# Patient Record
Sex: FEMALE | Race: WHITE | Employment: UNEMPLOYED | URBAN - METROPOLITAN AREA
[De-identification: names, ages, dates, MRNs, and addresses within clinical notes are randomized per-mention and may not be internally consistent; named-entity substitution may affect disease eponyms.]

---

## 2023-08-09 ENCOUNTER — LAB REQUISITION (OUTPATIENT)
Dept: LAB | Facility: HOSPITAL | Age: 33
End: 2023-08-09
Payer: COMMERCIAL

## 2023-08-09 DIAGNOSIS — M67.441 GANGLION, RIGHT HAND: ICD-10-CM

## 2023-08-09 PROCEDURE — 88304 TISSUE EXAM BY PATHOLOGIST: CPT | Performed by: ORTHOPAEDIC SURGERY

## 2023-09-26 ENCOUNTER — OFFICE VISIT (OUTPATIENT)
Facility: CLINIC | Age: 33
End: 2023-09-26
Payer: COMMERCIAL

## 2023-09-26 VITALS
BODY MASS INDEX: 41.5 KG/M2 | HEIGHT: 69 IN | SYSTOLIC BLOOD PRESSURE: 138 MMHG | HEART RATE: 70 BPM | WEIGHT: 280.19 LBS | DIASTOLIC BLOOD PRESSURE: 88 MMHG

## 2023-09-26 DIAGNOSIS — Z63.0 STRESS DUE TO MARITAL PROBLEMS: ICD-10-CM

## 2023-09-26 DIAGNOSIS — F43.20 ADJUSTMENT DISORDER, UNSPECIFIED TYPE: ICD-10-CM

## 2023-09-26 DIAGNOSIS — N30.10 CHRONIC INTERSTITIAL CYSTITIS: ICD-10-CM

## 2023-09-26 DIAGNOSIS — Z86.39 HISTORY OF VITAMIN D DEFICIENCY: ICD-10-CM

## 2023-09-26 DIAGNOSIS — N93.0 POSTCOITAL BLEEDING: ICD-10-CM

## 2023-09-26 DIAGNOSIS — E66.01 MORBID OBESITY WITH BMI OF 40.0-44.9, ADULT (HCC): ICD-10-CM

## 2023-09-26 DIAGNOSIS — N94.10 DYSPAREUNIA, FEMALE: ICD-10-CM

## 2023-09-26 DIAGNOSIS — Z31.69 ENCOUNTER FOR PRECONCEPTION CONSULTATION: Primary | ICD-10-CM

## 2023-09-26 DIAGNOSIS — Z86.010 HISTORY OF COLON POLYPS: ICD-10-CM

## 2023-09-26 DIAGNOSIS — Z91.89 AT RISK FOR INFERTILITY: ICD-10-CM

## 2023-09-26 DIAGNOSIS — M62.89 PELVIC FLOOR DYSFUNCTION: ICD-10-CM

## 2023-09-26 DIAGNOSIS — Z84.2: ICD-10-CM

## 2023-09-26 PROBLEM — R31.0 GROSS HEMATURIA: Status: ACTIVE | Noted: 2023-09-06

## 2023-09-26 PROBLEM — R42 VERTIGO: Status: ACTIVE | Noted: 2023-09-26

## 2023-09-26 PROBLEM — J45.909 ASTHMA: Status: ACTIVE | Noted: 2023-09-26

## 2023-09-26 PROBLEM — R42 LIGHTHEADEDNESS: Status: ACTIVE | Noted: 2023-09-26

## 2023-09-26 PROBLEM — M67.441 GANGLION OF FLEXOR TENDON SHEATH OF RIGHT INDEX FINGER: Status: ACTIVE | Noted: 2023-07-17

## 2023-09-26 PROBLEM — H53.19 VISUAL SNOW SYNDROME: Status: ACTIVE | Noted: 2023-09-26

## 2023-09-26 PROBLEM — R42 DIZZINESS: Status: ACTIVE | Noted: 2023-09-26

## 2023-09-26 PROBLEM — L30.9 ECZEMA: Status: ACTIVE | Noted: 2021-07-02

## 2023-09-26 PROBLEM — H53.9 VISUAL DISTURBANCE: Status: ACTIVE | Noted: 2022-11-01

## 2023-09-26 PROBLEM — J45.901 ACUTE SEVERE ASTHMA: Status: ACTIVE | Noted: 2017-08-07

## 2023-09-26 PROBLEM — J45.909 ASTHMATIC BRONCHITIS (HCC): Status: ACTIVE | Noted: 2022-11-01

## 2023-09-26 PROBLEM — Z86.0100 HISTORY OF COLON POLYPS: Status: ACTIVE | Noted: 2023-09-26

## 2023-09-26 PROBLEM — J45.909 ASTHMATIC BRONCHITIS: Status: ACTIVE | Noted: 2022-11-01

## 2023-09-26 PROBLEM — G43.909 MIGRAINE HEADACHE: Status: ACTIVE | Noted: 2023-09-26

## 2023-09-26 PROBLEM — J45.901: Status: ACTIVE | Noted: 2017-08-07

## 2023-09-26 PROBLEM — J45.909 ASTHMA (HCC): Status: ACTIVE | Noted: 2023-09-26

## 2023-09-26 LAB
CONTROL LINE PRESENT WITH A CLEAR BACKGROUND (YES/NO): YES YES/NO
KIT LOT #: NORMAL NUMERIC
PREGNANCY TEST, URINE: NEGATIVE

## 2023-09-26 PROCEDURE — 99205 OFFICE O/P NEW HI 60 MIN: CPT | Performed by: OBSTETRICS & GYNECOLOGY

## 2023-09-26 PROCEDURE — 3008F BODY MASS INDEX DOCD: CPT | Performed by: OBSTETRICS & GYNECOLOGY

## 2023-09-26 PROCEDURE — 3075F SYST BP GE 130 - 139MM HG: CPT | Performed by: OBSTETRICS & GYNECOLOGY

## 2023-09-26 PROCEDURE — 81025 URINE PREGNANCY TEST: CPT | Performed by: OBSTETRICS & GYNECOLOGY

## 2023-09-26 PROCEDURE — 3079F DIAST BP 80-89 MM HG: CPT | Performed by: OBSTETRICS & GYNECOLOGY

## 2023-09-26 RX ORDER — METHENAMINE, SODIUM PHOSPHATE, MONOBASIC, MONOHYDRATE, PHENYL SALICYLATE, METHYLENE BLUE, AND HYOSCYAMINE SULFATE 118; 40.8; 36; 10; .12 MG/1; MG/1; MG/1; MG/1; MG/1
CAPSULE ORAL
COMMUNITY

## 2023-09-26 RX ORDER — FLUTICASONE PROPIONATE AND SALMETEROL 50; 250 UG/1; UG/1
POWDER RESPIRATORY (INHALATION)
COMMUNITY

## 2023-09-26 RX ORDER — DIAZEPAM 2 MG/1
0.5 TABLET ORAL EVERY 6 HOURS PRN
COMMUNITY
Start: 2022-08-15

## 2023-09-26 RX ORDER — LEVOCETIRIZINE DIHYDROCHLORIDE 5 MG/1
5 TABLET, FILM COATED ORAL EVERY EVENING
COMMUNITY

## 2023-09-26 RX ORDER — OMEPRAZOLE 20 MG/1
20 CAPSULE, DELAYED RELEASE ORAL DAILY
COMMUNITY

## 2023-09-26 RX ORDER — CHOLECALCIFEROL (VITAMIN D3) 25 MCG
6000 CAPSULE ORAL DAILY
COMMUNITY

## 2023-09-26 RX ORDER — CHOLECALCIFEROL (VITAMIN D3) 25 MCG
1 TABLET,CHEWABLE ORAL DAILY
COMMUNITY

## 2023-09-26 RX ORDER — IBUPROFEN 200 MG
200 TABLET ORAL EVERY 8 HOURS PRN
COMMUNITY

## 2023-09-26 RX ORDER — MONTELUKAST SODIUM 10 MG/1
10 TABLET ORAL NIGHTLY
COMMUNITY
Start: 2022-10-20

## 2023-09-26 RX ORDER — ALBUTEROL SULFATE 2.5 MG/3ML
1 SOLUTION RESPIRATORY (INHALATION) EVERY 6 HOURS PRN
COMMUNITY
Start: 2022-10-24

## 2023-09-26 SDOH — SOCIAL STABILITY - SOCIAL INSECURITY: PROBLEMS IN RELATIONSHIP WITH SPOUSE OR PARTNER: Z63.0

## 2023-09-26 NOTE — PATIENT INSTRUCTIONS
Sexual dysfunction    Unfortunately sexual dysfunction and satisfaction are very common and somewhat difficult to figure out because they are often multifactorial.     Optimizing physical & mental healthy, reducing stress, and working on the quality of your relationship with your partner are actually the most important. Unfortunately anxiety & depression as well as the drugs used to treat those conditions are often associated with sexual dysfunction. There are two medications currently on the market for premenopausal women that actually decrease serotonin levels. These have not been studied in women with mental health conditions or who are taking psychiatric medications. These medications are also limited in their efficacy. Addyi  -oral taken nightly  -initially had a black box warning from the FDA regarding hypotension (low blood pressure & fainting) which can be made worse with alcohol    Vyleesi  -self-administered injection that is taken at least 45 min before you plan to have sex,   -limited to 1 use in 24 hours  -limited to 8 usees per month    There is also a non-FDA approved (herbal) option:  Ristela  -2 pills per day   -over the counter - through [employer]. Can order via phone or website.      A great option is to see a sex therapist. Fercho Azevedo can search for a provider near you through the following websites:    American Association of Sexuality Educators, Counselors, and Therapists  FebruarySpecials.    Society for Sex Therapy and Research  https://sstarnet.org/      [facility]     Dr. Naresh Moore  [phone]  Offices in [facility]    Dr. Lupis Tran  [phone]  Offices in [facility]     Dr. Melonie Flores  [phone]  Offices in [facility] cycle day 5-9  Cycle day 3 labs (2-5 ok) - prior to 10 am, fasting

## 2023-09-29 ENCOUNTER — LAB ENCOUNTER (OUTPATIENT)
Dept: LAB | Facility: HOSPITAL | Age: 33
End: 2023-09-29
Attending: OBSTETRICS & GYNECOLOGY
Payer: COMMERCIAL

## 2023-09-29 ENCOUNTER — TELEPHONE (OUTPATIENT)
Facility: CLINIC | Age: 33
End: 2023-09-29

## 2023-09-29 DIAGNOSIS — Z91.89 AT RISK FOR INFERTILITY: ICD-10-CM

## 2023-09-29 DIAGNOSIS — Z86.39 HISTORY OF VITAMIN D DEFICIENCY: ICD-10-CM

## 2023-09-29 PROCEDURE — 87591 N.GONORRHOEAE DNA AMP PROB: CPT

## 2023-09-29 PROCEDURE — 87491 CHLMYD TRACH DNA AMP PROBE: CPT

## 2023-09-29 NOTE — TELEPHONE ENCOUNTER
Spoke with pt. She was seen 9/26/23 to discuss fertility. Hysterosalpingogram was discussed at her appointment. When she called to schedule there was only an order for a pelvic ultrasound. She thought she was to schedule the HSG to make sure her tubes were open. I let her know I would get a message to Dr. Chery Grey and call with recommendations. Verbalized understanding.

## 2023-09-29 NOTE — TELEPHONE ENCOUNTER
Pt states when scheduling her US she thought  wanted dye injected into the uterus; CS states the order does not states that; pls advise.

## 2023-10-02 LAB
C TRACH DNA SPEC QL NAA+PROBE: NEGATIVE
N GONORRHOEA DNA SPEC QL NAA+PROBE: NEGATIVE

## 2023-10-04 ENCOUNTER — TELEPHONE (OUTPATIENT)
Facility: CLINIC | Age: 33
End: 2023-10-04

## 2023-10-04 ENCOUNTER — LAB ENCOUNTER (OUTPATIENT)
Dept: LAB | Age: 33
End: 2023-10-04
Attending: OBSTETRICS & GYNECOLOGY
Payer: COMMERCIAL

## 2023-10-04 DIAGNOSIS — Z91.89 AT RISK FOR INFERTILITY: ICD-10-CM

## 2023-10-04 DIAGNOSIS — Z91.89 AT RISK FOR INFERTILITY: Primary | ICD-10-CM

## 2023-10-04 DIAGNOSIS — Z86.39 HISTORY OF VITAMIN D DEFICIENCY: ICD-10-CM

## 2023-10-04 LAB
ALBUMIN SERPL-MCNC: 3.5 G/DL (ref 3.4–5)
ALBUMIN/GLOB SERPL: 0.8 {RATIO} (ref 1–2)
ALP LIVER SERPL-CCNC: 93 U/L
ALT SERPL-CCNC: 29 U/L
AMB EXT HGBA1C: 6.4 %
AMB EXT TSH: 3.7 MIU/ML
ANION GAP SERPL CALC-SCNC: 7 MMOL/L (ref 0–18)
AST SERPL-CCNC: 24 U/L (ref 15–37)
BASOPHILS # BLD AUTO: 0.01 X10(3) UL (ref 0–0.2)
BASOPHILS NFR BLD AUTO: 0.1 %
BILIRUB SERPL-MCNC: 0.5 MG/DL (ref 0.1–2)
BUN BLD-MCNC: 13 MG/DL (ref 7–18)
CALCIUM BLD-MCNC: 8.6 MG/DL (ref 8.5–10.1)
CHLORIDE SERPL-SCNC: 108 MMOL/L (ref 98–112)
CHOLEST SERPL-MCNC: 165 MG/DL (ref ?–200)
CO2 SERPL-SCNC: 21 MMOL/L (ref 21–32)
CREAT BLD-MCNC: 0.74 MG/DL
DHEA-S SERPL-MCNC: 93.6 UG/DL
EGFRCR SERPLBLD CKD-EPI 2021: 109 ML/MIN/1.73M2 (ref 60–?)
EOSINOPHIL # BLD AUTO: 0.33 X10(3) UL (ref 0–0.7)
EOSINOPHIL NFR BLD AUTO: 3.5 %
ERYTHROCYTE [DISTWIDTH] IN BLOOD BY AUTOMATED COUNT: 14.5 %
ESTRADIOL SERPL-MCNC: 17.6 PG/ML
FASTING PATIENT LIPID ANSWER: YES
FASTING STATUS PATIENT QL REPORTED: YES
FSH SERPL-ACNC: 6.5 MIU/ML
GLOBULIN PLAS-MCNC: 4.4 G/DL (ref 2.8–4.4)
GLUCOSE BLD-MCNC: 93 MG/DL (ref 70–99)
HCT VFR BLD AUTO: 40.1 %
HDLC SERPL-MCNC: 71 MG/DL (ref 40–59)
HGB BLD-MCNC: 12.6 G/DL
IMM GRANULOCYTES # BLD AUTO: 0.03 X10(3) UL (ref 0–1)
IMM GRANULOCYTES NFR BLD: 0.3 %
LDLC SERPL CALC-MCNC: 79 MG/DL (ref ?–100)
LYMPHOCYTES # BLD AUTO: 0.99 X10(3) UL (ref 1–4)
LYMPHOCYTES NFR BLD AUTO: 10.5 %
MCH RBC QN AUTO: 24.6 PG (ref 26–34)
MCHC RBC AUTO-ENTMCNC: 31.4 G/DL (ref 31–37)
MCV RBC AUTO: 78.2 FL
MONOCYTES # BLD AUTO: 0.37 X10(3) UL (ref 0.1–1)
MONOCYTES NFR BLD AUTO: 3.9 %
NEUTROPHILS # BLD AUTO: 7.7 X10 (3) UL (ref 1.5–7.7)
NEUTROPHILS # BLD AUTO: 7.7 X10(3) UL (ref 1.5–7.7)
NEUTROPHILS NFR BLD AUTO: 81.7 %
NONHDLC SERPL-MCNC: 94 MG/DL (ref ?–130)
OSMOLALITY SERPL CALC.SUM OF ELEC: 282 MOSM/KG (ref 275–295)
PLATELET # BLD AUTO: 226 10(3)UL (ref 150–450)
POTASSIUM SERPL-SCNC: 4 MMOL/L (ref 3.5–5.1)
PROGEST SERPL-MCNC: 0.71 NG/ML
PROLACTIN SERPL-MCNC: 6.7 NG/ML
PROT SERPL-MCNC: 7.9 G/DL (ref 6.4–8.2)
RBC # BLD AUTO: 5.13 X10(6)UL
SODIUM SERPL-SCNC: 136 MMOL/L (ref 136–145)
TRIGL SERPL-MCNC: 77 MG/DL (ref 30–149)
TSI SER-ACNC: 3.7 MIU/ML (ref 0.36–3.74)
VLDLC SERPL CALC-MCNC: 12 MG/DL (ref 0–30)
WBC # BLD AUTO: 9.4 X10(3) UL (ref 4–11)

## 2023-10-04 PROCEDURE — 36415 COLL VENOUS BLD VENIPUNCTURE: CPT

## 2023-10-04 PROCEDURE — 80061 LIPID PANEL: CPT

## 2023-10-04 PROCEDURE — 82306 VITAMIN D 25 HYDROXY: CPT

## 2023-10-04 PROCEDURE — 83036 HEMOGLOBIN GLYCOSYLATED A1C: CPT

## 2023-10-04 PROCEDURE — 84443 ASSAY THYROID STIM HORMONE: CPT

## 2023-10-04 PROCEDURE — 84146 ASSAY OF PROLACTIN: CPT

## 2023-10-04 PROCEDURE — 85025 COMPLETE CBC W/AUTO DIFF WBC: CPT

## 2023-10-04 PROCEDURE — 84143 ASSAY OF 17-HYDROXYPREGNENO: CPT

## 2023-10-04 PROCEDURE — 83520 IMMUNOASSAY QUANT NOS NONAB: CPT

## 2023-10-04 PROCEDURE — 80053 COMPREHEN METABOLIC PANEL: CPT

## 2023-10-04 PROCEDURE — 83001 ASSAY OF GONADOTROPIN (FSH): CPT

## 2023-10-04 PROCEDURE — 82670 ASSAY OF TOTAL ESTRADIOL: CPT

## 2023-10-04 PROCEDURE — 84410 TESTOSTERONE BIOAVAILABLE: CPT

## 2023-10-04 PROCEDURE — 82627 DEHYDROEPIANDROSTERONE: CPT

## 2023-10-04 PROCEDURE — 84144 ASSAY OF PROGESTERONE: CPT

## 2023-10-04 NOTE — TELEPHONE ENCOUNTER
Patient states she is on cycle day 3 and needs her lab orders. She is currently at the lab and is waiting for the labs to be ordered. Spoke with Dr Kira Sinha and she placed the orders. Patient also questioning hysterosonogram order. DR Kira Sinha would like her to have a pelvic US first. Patient aware and understanding verbalized.

## 2023-10-05 LAB
EST. AVERAGE GLUCOSE BLD GHB EST-MCNC: 137 MG/DL (ref 68–126)
HBA1C MFR BLD: 6.4 % (ref ?–5.7)

## 2023-10-06 LAB — 17-OH PROGESTERONE: 23 NG/DL

## 2023-10-08 LAB — MULLERIAN AMH: 1.21 NG/ML

## 2023-10-09 LAB
SEX HORM BIND GLOB: 61.1 NMOL/L
TESTOST % FREE+WEAK BND: 11.9 %
TESTOST FREE+WEAK BND: 1.2 NG/DL
TESTOSTERONE TOT /MS: 10.1 NG/DL

## 2023-10-12 ENCOUNTER — TELEPHONE (OUTPATIENT)
Facility: CLINIC | Age: 33
End: 2023-10-12

## 2023-10-12 PROBLEM — R73.03 PREDIABETES: Status: ACTIVE | Noted: 2023-10-12

## 2023-10-12 PROBLEM — R73.09 ELEVATED HEMOGLOBIN A1C: Status: ACTIVE | Noted: 2023-10-04

## 2023-10-12 NOTE — TELEPHONE ENCOUNTER
Pt would like to know if  is going to order medications that were discussed or if she needs to f/u with PCP; pls advise. Pt states she had blood drawn in the lab for vitaminD and ovulation on Sept 29th but doesn't see any results; no orders in chart for 9/29/23; pls advise.

## 2023-10-16 ENCOUNTER — HOSPITAL ENCOUNTER (OUTPATIENT)
Dept: ULTRASOUND IMAGING | Age: 33
Discharge: HOME | End: 2023-10-16
Attending: OBSTETRICS & GYNECOLOGY
Payer: COMMERCIAL

## 2023-10-16 DIAGNOSIS — N93.0 POSTCOITAL BLEEDING: ICD-10-CM

## 2023-10-16 DIAGNOSIS — N94.10 DYSPAREUNIA, FEMALE: ICD-10-CM

## 2023-10-16 DIAGNOSIS — Z91.89 AT RISK FOR INFERTILITY: ICD-10-CM

## 2023-10-16 PROCEDURE — 76830 TRANSVAGINAL US NON-OB: CPT | Performed by: OBSTETRICS & GYNECOLOGY

## 2023-10-16 PROCEDURE — 76856 US EXAM PELVIC COMPLETE: CPT | Performed by: OBSTETRICS & GYNECOLOGY

## 2023-10-23 NOTE — PROGRESS NOTES
Partner's Semen Analysis result:  Name: Noe Saldaña   KYLE 89    Normal spermatozoa 4%  Spermatozoa with%:      Head defects 79%      Mid piece defects 17%      Tail defects 0%    Semen analysis result is WNL.

## 2023-10-27 ENCOUNTER — TELEPHONE (OUTPATIENT)
Facility: CLINIC | Age: 33
End: 2023-10-27

## 2023-10-27 NOTE — TELEPHONE ENCOUNTER
Verified pt's partner name/:  Sean Huerta     89    Discussed Per Dr. Carl Joseph review: His semen analysis was normal     2. Pt awaiting Rx Metformin pended on 10/12/23- routed to provider - will send a secure chat. 3. Pt request for copy of Semen analysis result - placed at  for . 4. Request for her records to be faxed Dr. Socrates De Jesus. - records faxed. Patient verbalized understanding, agreed to and intend to comply with plan of care.

## 2023-11-06 ENCOUNTER — TELEPHONE (OUTPATIENT)
Facility: CLINIC | Age: 33
End: 2023-11-06

## 2023-11-06 LAB — AMB EXT MYRIAD FORESIGHT: NEGATIVE

## 2023-11-06 NOTE — TELEPHONE ENCOUNTER
Spoke with patient. She is aware semen analysis was faxed as requested and I will forward message for Dr Anna Zeng information. Understanding verbalized.

## 2023-11-06 NOTE — TELEPHONE ENCOUNTER
Patient needs a copy of her spouses semen analysis sent to Dr Jackie Langley IVF     Also her IVF Dr wants her to stop taking her Thyroid medication and NOT  have the biopsy done

## 2024-01-06 ENCOUNTER — HOSPITAL ENCOUNTER (OUTPATIENT)
Age: 34
Discharge: HOME | End: 2024-01-06
Payer: COMMERCIAL

## 2024-01-06 ENCOUNTER — APPOINTMENT (OUTPATIENT)
Dept: GENERAL RADIOLOGY | Age: 34
End: 2024-01-06
Attending: PHYSICIAN ASSISTANT
Payer: COMMERCIAL

## 2024-01-06 VITALS
OXYGEN SATURATION: 97 % | TEMPERATURE: 98 F | HEART RATE: 88 BPM | RESPIRATION RATE: 24 BRPM | SYSTOLIC BLOOD PRESSURE: 153 MMHG | DIASTOLIC BLOOD PRESSURE: 82 MMHG

## 2024-01-06 DIAGNOSIS — M62.830 SPASM OF MUSCLE OF LOWER BACK: ICD-10-CM

## 2024-01-06 DIAGNOSIS — M54.59 INTRACTABLE LOW BACK PAIN: Primary | ICD-10-CM

## 2024-01-06 LAB
B-HCG UR QL: NEGATIVE
BILIRUB UR QL STRIP: NEGATIVE
GLUCOSE UR STRIP-MCNC: NEGATIVE MG/DL
HGB UR QL STRIP: NEGATIVE
KETONES UR STRIP-MCNC: NEGATIVE MG/DL
LEUKOCYTE ESTERASE UR QL STRIP: NEGATIVE
NITRITE UR QL STRIP: NEGATIVE
PH UR STRIP: 6 [PH]
PROT UR STRIP-MCNC: 100 MG/DL
SP GR UR STRIP: 1.02
UROBILINOGEN UR STRIP-ACNC: <2 MG/DL

## 2024-01-06 RX ORDER — PREDNISONE 20 MG/1
40 TABLET ORAL DAILY
COMMUNITY

## 2024-01-06 RX ORDER — PROGESTERONE 200 MG/1
CAPSULE ORAL
COMMUNITY
Start: 2023-12-15

## 2024-01-06 NOTE — ED PROVIDER NOTES
Patient Seen in: [facility]      History     Chief Complaint   Patient presents with    Back Pain     Stated Complaint: Back Pain    Subjective:   HPI    33-year-old female here with complaint of acute back pain with spasm that started yesterday.  Patient reports that in thousand 9 she was thrown from a horse hit a rock and has had issues with her SI joint with fracture etc.  Patient took a Valium at home last night.  Patient is undergoing atrial fertilization and is limited to the medication she can take.  Patient called the office while in the room and she can only take steroids and Tylenol they do not recommend any imaging NSAIDs etc. for at least a week until they find out if she is pregnant.  Patient denies chest pain, shortness of breath, cough, abdominal pain, nausea, vomiting or diarrhea.  Patient denies dysuria, hematuria or flank pain.  Patient able to ambulate.  Patient denies muscle weakness or neurodeficits.  Afebrile.    Objective:   Past Medical History:   Diagnosis Date    Abdominal pain 03/02/2016    Acute severe asthma 08/07/2017    Asthma 09/26/2023    Asthmatic bronchitis 11/01/2022    Chronic interstitial cystitis 01/2022    Symptoms since 8 years old but not formally diagnosed until adulthood.    Colon polyp 2019    Complicated migraine     with visual aura, verbal & balance/coordination issues.    Dizziness 09/26/2023    Dysphagia     E-coli UTI 09/06/2023    Eczema 07/02/2021    Elevated hemoglobin A1c 10/04/2023    10/4/23 - A1c 6.4%    Ganglion of flexor tendon sheath of right index finger 07/17/2023    GERD (gastroesophageal reflux disease)     Gross hematuria 09/06/2023    urine culture >100k E.coli    History of pelvic ultrasound 10/16/2023    Pelvic US unremarkable    Lightheadedness 09/26/2023    Migraine with aura     Morbid obesity with BMI of 40.0-44.9, adult (HCC) 09/26/2023    Numbness and tingling 01/20/2023    Rheum visit 1/20/23 - clinical picture,  imaging, lab data not consistent with known rheum disorders. lower extremity numbness and tingling, you could consider nerve conduction studies or special testing for small fiber neuropathy (though admittedly, this would not explain vertigo, visual snow or migraine symptoms).    Pap smear for cervical cancer screening 02/13/2023    Pap & HPV negative 2/13/23 - Peggy Melgar. Per [facility]    Prediabetes 10/04/2023    10/4/23 A1c 6.4%    Vertigo     mal de debarquement syndrome. MDDS - frontal lobe communicates incorrectly to vestibular system. Feels likes she is moving when she is not. Went to PT. Helped. Valium helps to stop the symptoms.    Visual disturbance 11/01/2022    Visual snow syndrome     reports this developed while on Nexplanon. fuzzy dots in front of her 20/20 vision all the time. Has a fog & some \"purple marks\" in her vision as well. Negative image for up to 5 minutes at its worst. Can get black spots/moving spots.            The patient's medication list, past medical history and social history elements  as listed in today's nurse's notes are reviewed and agree.   The patient's family history is reviewed and is noncontributory to the presenting problem, except as indicated as above.     Past Surgical History:   Procedure Laterality Date    COLONOSCOPY & POLYPECTOMY  2019    Does have polyps. Was told every 3 years    CYSTOURETHROSCOPY  10/18/2023    Cystoscopy Dr. Ko Flores for gross hematuria. Unremarkable.    EGD  2019    esophagus inflamed. Was having dysphagia & GERD.    INSERT CONTRACEPTIVE CAPSUL  2012    Nexplanon    LAPAROSCOPIC APPENDECTOMY  10/11/2014    not ruptured    LAPAROSCOPIC CHOLECYSTECTOMY  2012    +Stones & sludge    OTHER SURGICAL HISTORY Right 08/09/2023    right ringer finger cyst removal    REMOVAL OF CONTRACEPTIVE CAPSUL  2014    H/o \"severe allergic reaction\" to birth control (Nexplanon) which consisted of severe migraines, visual issues. Reports this episode  occurred after one year with nexplanon. Initially improved after removal of nexplanon implant, but then reoccurred with subsequent menstrual cycle. States these symptoms significantly improved with nuva ring and cessation of menstrual cycles    REMOVAL OF OVARIAN CYST(S) Right 10/11/2014    done at same time of appendectomy. Maybe just functional cyst    SINUS SURGERY    2019    deviated septum    SPINAL PUNCTURE,LUMBAR,DIAGNOSTIC  2014    She had an LP in 2014 which, per report, showed the following:-- CSF May 2014. Glc 51, protein 34, WBC 0, RBC 6, VDRL negative, OCB negative, opening pressure 14.5 cm in lateral position.                Social History     Socioeconomic History    Marital status:    Tobacco Use    Smoking status: Never    Smokeless tobacco: Never   Vaping Use    Vaping Use: Never used   Substance and Sexual Activity    Alcohol use: Yes     Comment: rarely    Drug use: Never    Sexual activity: Yes     Partners: Male              Review of Systems    Positive for stated complaint: Back Pain  Other systems are as noted in HPI.  Constitutional and vital signs reviewed.      All other systems reviewed and negative except as noted above.    Physical Exam     ED Triage Vitals [01/06/24 1120]   /82   Pulse 88   Resp 24   Temp 97.9 °F (36.6 °C)   Temp src Temporal   SpO2 97 %   O2 Device None (Room air)       Current:/82   Pulse 88   Temp 97.9 °F (36.6 °C) (Temporal)   Resp 24   LMP 12/11/2023 (Exact Date)   SpO2 97%         Physical Exam  Vitals and nursing note reviewed.   Constitutional:       Appearance: Normal appearance. She is well-developed.   HENT:      Head: Normocephalic.      Right Ear: External ear normal.      Left Ear: External ear normal.      Nose: Nose normal.      Mouth/Throat:      Mouth: Mucous membranes are moist.   Eyes:      Conjunctiva/sclera: Conjunctivae normal.      Pupils: Pupils are equal, round, and reactive to light.   Cardiovascular:      Rate and  Rhythm: Normal rate and regular rhythm.      Heart sounds: Normal heart sounds.   Pulmonary:      Effort: Pulmonary effort is normal.      Breath sounds: Normal breath sounds.   Musculoskeletal:      Cervical back: Normal range of motion and neck supple.      Lumbar back: Spasms and tenderness present. Decreased range of motion.      Comments: LB: no erythema/warmth/swelling:  LLE/RLE: FROM< N/V intact, strength 5/5   Skin:     General: Skin is warm.      Capillary Refill: Capillary refill takes less than 2 seconds.   Neurological:      General: No focal deficit present.      Mental Status: She is alert and oriented to person, place, and time.   Psychiatric:         Mood and Affect: Mood normal.         Behavior: Behavior normal.         Thought Content: Thought content normal.         Judgment: Judgment normal.             ED Course        NOTE: Alternate with heat and ice as directed by your fertility specialist.  If symptoms persist or worsen i.e. muscle weakness neurodeficits severe back pain go directly to the emergency room for further evaluation and treatment.              MDM           Clinical Impression: back pain/spasm  Course of Treatment:     The Decadron will work in your system the next several days.  Alternate with heat and ice.  Take Tylenol as needed.    If symptoms persist or worsen i.e. increasing back pain fevers muscle weakness or neurodeficits.  Dial 911 or go directly to the emergency room for further evaluation and treatment.    The patient is encouraged to return if any concerning symptoms arise. Additional verbal discharge instructions are given and discussed. Discharge medications are discussed. The patient is in good condition throughout the visit today and remains so upon discharge. I discuss the plan of care with the patient, who expresses understanding. All questions and concerns are addressed to the patient's satisfaction prior to discharge today.  Previous conversations with PCP and  charts were reviewed.                                   Disposition and Plan     Clinical Impression:  1. Intractable low back pain    2. Spasm of muscle of lower back         Disposition:  Discharge  1/6/2024 12:21 pm    Follow-up:  Yin Naylor  [address] [id number] [phone]          Tien Herrera MD  [address] [address]  [phone]                Medications Prescribed:  Discharge Medication List as of 1/6/2024 12:24 PM

## 2024-01-06 NOTE — DISCHARGE INSTRUCTIONS
Please return to the ER/clinic if symptoms worsen. Follow-up with your PCP in 24-48 hours as needed.    The Decadron will work in your system the next several days.  Alternate with heat and ice.  Take Tylenol as needed.    If symptoms persist or worsen i.e. increasing back pain fevers muscle weakness or neurodeficits.  Dial 911 or go directly to the emergency room for further evaluation and treatment.

## 2024-01-06 NOTE — ED INITIAL ASSESSMENT (HPI)
Pt has had back pain since yesterday when she was getting out of the truck, and twisted her SI Joint.  No numbness or tingling,  she took valium last night and it helped, and 2  20 mg prednisone, and tylenol

## 2024-01-16 LAB — AMB EXT RH FACTOR: POSITIVE

## 2024-02-01 ENCOUNTER — TELEPHONE (OUTPATIENT)
Facility: CLINIC | Age: 34
End: 2024-02-01

## 2024-02-01 NOTE — TELEPHONE ENCOUNTER
Spoke with pt. LMP- 12/18/23 6 weeks 3 days IUI pregnancy with Dr. Walker G-2 P-0 FOB- 2/23/24.    Pt has asthma & severe allergies. Currently taking Singulair at bedtime & uses Advair Diskus BID. Stopped the Advair when pregnancy confirmed. Needs an alternative for Advair and if it is ok to continue the Singulair.     Also taking Metformin for elevated triglycerides & weight loss. Wants to know if ok to continue until FOB appointment and discuss at that time or if she should stop now. Levothyroxine dose changed from 25 MCG to 50 MCG by fertility doctor.      Aware I will route to one of our providers and call with recommendations. OK to leave a detailed message if unable to answer. Verbalized understanding.

## 2024-02-01 NOTE — TELEPHONE ENCOUNTER
Pt called in scheduled their FOB appt, asked to speak with a nurse about pre existing conditions and medications being taken while pregnant.

## 2024-02-19 ENCOUNTER — TELEPHONE (OUTPATIENT)
Facility: CLINIC | Age: 34
End: 2024-02-19

## 2024-02-19 NOTE — PROGRESS NOTES
02/06/2024  Single IUP  CHRIS: 09/22/2024  GA: 7w 1d  FHT: 147  CRL: 10.58  Ovaries WNL  Cervix WNL    01/30/2024  Single IUP   CHRIS: 09/23/2024  GA: 6w 1d  FHT: 113  CRL: 3.90  Ovaries WNL  Cervix WNL    01/23/2024  Single IUP  CHRIS: 09/23/2024  GA: 5w 1d  No FHT  Ovaries WNL  Cervix WNL

## 2024-02-23 ENCOUNTER — ULTRASOUND ENCOUNTER (OUTPATIENT)
Facility: CLINIC | Age: 34
End: 2024-02-23
Payer: COMMERCIAL

## 2024-02-23 ENCOUNTER — INITIAL PRENATAL (OUTPATIENT)
Dept: OBGYN CLINIC | Facility: CLINIC | Age: 34
End: 2024-02-23
Payer: COMMERCIAL

## 2024-02-23 VITALS
WEIGHT: 266.75 LBS | SYSTOLIC BLOOD PRESSURE: 128 MMHG | HEIGHT: 69 IN | BODY MASS INDEX: 39.51 KG/M2 | HEART RATE: 75 BPM | DIASTOLIC BLOOD PRESSURE: 83 MMHG

## 2024-02-23 DIAGNOSIS — O99.210 OTHER OBESITY DUE TO EXCESS CALORIES AFFECTING PREGNANCY, ANTEPARTUM (HCC): ICD-10-CM

## 2024-02-23 DIAGNOSIS — Z34.81 PRENATAL CARE, SUBSEQUENT PREGNANCY, FIRST TRIMESTER (HCC): ICD-10-CM

## 2024-02-23 DIAGNOSIS — Z34.91 INITIAL OBSTETRIC VISIT IN FIRST TRIMESTER (HCC): Primary | ICD-10-CM

## 2024-02-23 DIAGNOSIS — E03.8 OTHER SPECIFIED HYPOTHYROIDISM: ICD-10-CM

## 2024-02-23 DIAGNOSIS — E66.09 OTHER OBESITY DUE TO EXCESS CALORIES AFFECTING PREGNANCY, ANTEPARTUM (HCC): ICD-10-CM

## 2024-02-23 PROBLEM — O09.811: Status: ACTIVE | Noted: 2024-02-23

## 2024-02-23 LAB
APPEARANCE: CLEAR
BILIRUBIN: NEGATIVE
GLUCOSE (URINE DIPSTICK): NEGATIVE MG/DL
KETONES (URINE DIPSTICK): NEGATIVE MG/DL
LEUKOCYTES: NEGATIVE
MULTISTIX LOT#: NORMAL NUMERIC
NITRITE, URINE: NEGATIVE
OCCULT BLOOD: NEGATIVE
PH, URINE: 6 (ref 4.5–8)
PROTEIN (URINE DIPSTICK): NEGATIVE MG/DL
SPECIFIC GRAVITY: 1.01 (ref 1–1.03)
URINE-COLOR: CLEAR
UROBILINOGEN,SEMI-QN: 0.2 MG/DL (ref 0–1.9)

## 2024-02-23 PROCEDURE — 87591 N.GONORRHOEAE DNA AMP PROB: CPT

## 2024-02-23 PROCEDURE — 76801 OB US < 14 WKS SINGLE FETUS: CPT | Performed by: OBSTETRICS & GYNECOLOGY

## 2024-02-23 PROCEDURE — 87086 URINE CULTURE/COLONY COUNT: CPT

## 2024-02-23 PROCEDURE — 87491 CHLMYD TRACH DNA AMP PROBE: CPT

## 2024-02-23 PROCEDURE — 81002 URINALYSIS NONAUTO W/O SCOPE: CPT

## 2024-02-23 NOTE — PROGRESS NOTES
Initial OB - 9w4d     CHRIS 24 by LMP c/w 7w1d US     OBhx - , IUI pregnancy   PMHx - Asthma - well controlled. Denies blood transfusion, HIV, hepatitis, HSV, VTE, or congenital heart disease   Psurghx -  appendectomy and ovarian cyst removal, - cholecystecomy, - left knee patellar release, - sinus surgery,  - ganglion cyst removal right ring finger   Last pap  per pt normal     34 year old , EDC by LMP     Aneuploidy screening discussed   - NIPS - wants, draw at next visit   -Carrier done with Dr. Walker -reviewed records - [facility] panel negative results       Asthma  -per pt well controlled, does not have a PCP since she switched to her 's insurance - advise her to follow up  -takes albuterol prn, Singular daily, was on Advair - has stopped when she found out she is pregnant.     Subclinical hypothyroid   -24- TSH 5.95   -is currently taking 50 mcg levothyroxine  -TSH, free T4 added to PNL     Hx Prediabetes   10/4/23  A1c 6.3%  -is currently taking metformin - advise to stop for now, will recheck A1c and order early 1 hr GTT    Obesity (39)  -limit wt gain 11-20 lbs  -A1c and CMP added to PNL   -advise to eat healthy meals and exercise  -plan on L2US, growths, NSTs per MFM recommendations

## 2024-02-25 DIAGNOSIS — O36.80X0 PREGNANCY WITH INCONCLUSIVE FETAL VIABILITY, SINGLE OR UNSPECIFIED FETUS (HCC): Primary | ICD-10-CM

## 2024-02-26 LAB
C TRACH DNA SPEC QL NAA+PROBE: NEGATIVE
N GONORRHOEA DNA SPEC QL NAA+PROBE: NEGATIVE

## 2024-03-01 ENCOUNTER — TELEPHONE (OUTPATIENT)
Dept: OBGYN CLINIC | Facility: CLINIC | Age: 34
End: 2024-03-01

## 2024-03-01 ENCOUNTER — NURSE ONLY (OUTPATIENT)
Dept: OBGYN CLINIC | Facility: CLINIC | Age: 34
End: 2024-03-01
Payer: COMMERCIAL

## 2024-03-01 VITALS
DIASTOLIC BLOOD PRESSURE: 66 MMHG | WEIGHT: 257 LBS | SYSTOLIC BLOOD PRESSURE: 110 MMHG | HEART RATE: 74 BPM | BODY MASS INDEX: 38.06 KG/M2 | HEIGHT: 69 IN

## 2024-03-01 PROCEDURE — 36415 COLL VENOUS BLD VENIPUNCTURE: CPT

## 2024-03-04 LAB
AMB EXT MYRIAD TRISOMY 13: NEGATIVE
AMB EXT MYRIAD TRISOMY 18: NEGATIVE
AMB EXT MYRIAD TRISOMY 21: NEGATIVE

## 2024-03-06 ENCOUNTER — TELEPHONE (OUTPATIENT)
Facility: CLINIC | Age: 34
End: 2024-03-06

## 2024-03-18 ENCOUNTER — TELEPHONE (OUTPATIENT)
Facility: CLINIC | Age: 34
End: 2024-03-18

## 2024-03-18 DIAGNOSIS — E03.8 OTHER SPECIFIED HYPOTHYROIDISM: Primary | ICD-10-CM

## 2024-03-18 DIAGNOSIS — O99.210 OTHER OBESITY DUE TO EXCESS CALORIES AFFECTING PREGNANCY, ANTEPARTUM (HCC): ICD-10-CM

## 2024-03-18 DIAGNOSIS — Z34.91 INITIAL OBSTETRIC VISIT IN FIRST TRIMESTER (HCC): ICD-10-CM

## 2024-03-18 DIAGNOSIS — E66.09 OTHER OBESITY DUE TO EXCESS CALORIES AFFECTING PREGNANCY, ANTEPARTUM (HCC): ICD-10-CM

## 2024-03-18 NOTE — TELEPHONE ENCOUNTER
Please send FOB Labs to [facility]. Pt originally requested [facility], but agreed to [facility]. Registration corrected. Thank you.

## 2024-03-20 ENCOUNTER — ROUTINE PRENATAL (OUTPATIENT)
Facility: CLINIC | Age: 34
End: 2024-03-20
Payer: COMMERCIAL

## 2024-03-20 VITALS
HEIGHT: 69 IN | BODY MASS INDEX: 39.1 KG/M2 | SYSTOLIC BLOOD PRESSURE: 112 MMHG | WEIGHT: 264 LBS | DIASTOLIC BLOOD PRESSURE: 72 MMHG | HEART RATE: 77 BPM

## 2024-03-20 DIAGNOSIS — Z36.89 ENCOUNTER FOR FETAL ANATOMIC SURVEY (HCC): ICD-10-CM

## 2024-03-20 DIAGNOSIS — R39.89 BLADDER PAIN: ICD-10-CM

## 2024-03-20 DIAGNOSIS — O09.01: ICD-10-CM

## 2024-03-20 DIAGNOSIS — E07.9 THYROID DYSFUNCTION IN PREGNANCY IN FIRST TRIMESTER (HCC): ICD-10-CM

## 2024-03-20 DIAGNOSIS — O99.211 OTHER OBESITY DUE TO EXCESS CALORIES AFFECTING PREGNANCY IN FIRST TRIMESTER (HCC): Primary | ICD-10-CM

## 2024-03-20 DIAGNOSIS — O21.9 PREGNANCY RELATED NAUSEA AND VOMITING, ANTEPARTUM (HCC): ICD-10-CM

## 2024-03-20 DIAGNOSIS — N30.10 CHRONIC INTERSTITIAL CYSTITIS: ICD-10-CM

## 2024-03-20 DIAGNOSIS — O99.519 MATERNAL ASTHMA COMPLICATING PREGNANCY (HCC): ICD-10-CM

## 2024-03-20 DIAGNOSIS — R12 HEARTBURN DURING PREGNANCY IN FIRST TRIMESTER (HCC): ICD-10-CM

## 2024-03-20 DIAGNOSIS — O99.891 DISORDER OF MUSCULOSKELETAL SYSTEM DURING PREGNANCY (HCC): ICD-10-CM

## 2024-03-20 DIAGNOSIS — Z36.1 ANTENATAL SCREENING FOR RAISED ALPHAFETOPROTEIN LEVEL (HCC): ICD-10-CM

## 2024-03-20 DIAGNOSIS — O26.891 HEARTBURN DURING PREGNANCY IN FIRST TRIMESTER (HCC): ICD-10-CM

## 2024-03-20 DIAGNOSIS — N94.10 DYSPAREUNIA, FEMALE: ICD-10-CM

## 2024-03-20 DIAGNOSIS — O09.811: ICD-10-CM

## 2024-03-20 DIAGNOSIS — J45.909 MATERNAL ASTHMA COMPLICATING PREGNANCY (HCC): ICD-10-CM

## 2024-03-20 DIAGNOSIS — O99.281 THYROID DYSFUNCTION IN PREGNANCY IN FIRST TRIMESTER (HCC): ICD-10-CM

## 2024-03-20 DIAGNOSIS — M62.89 PELVIC FLOOR DYSFUNCTION: ICD-10-CM

## 2024-03-20 DIAGNOSIS — E66.09 OTHER OBESITY DUE TO EXCESS CALORIES AFFECTING PREGNANCY IN FIRST TRIMESTER (HCC): Primary | ICD-10-CM

## 2024-03-20 PROBLEM — E66.01 MORBID OBESITY WITH BMI OF 40.0-44.9, ADULT (HCC): Status: RESOLVED | Noted: 2023-09-26 | Resolved: 2024-03-20

## 2024-03-20 PROBLEM — E66.01 SEVERE OBESITY DUE TO EXCESS CALORIES AFFECTING PREGNANCY, ANTEPARTUM (HCC): Status: ACTIVE | Noted: 2024-03-20

## 2024-03-20 PROBLEM — O99.210 SEVERE OBESITY DUE TO EXCESS CALORIES AFFECTING PREGNANCY, ANTEPARTUM (HCC): Status: ACTIVE | Noted: 2024-03-20

## 2024-03-20 RX ORDER — METOCLOPRAMIDE 10 MG/1
10 TABLET ORAL EVERY 6 HOURS PRN
Qty: 30 TABLET | Refills: 3 | Status: SHIPPED | OUTPATIENT
Start: 2024-03-20

## 2024-03-20 RX ORDER — FLUTICASONE PROPIONATE 110 UG/1
2 AEROSOL, METERED RESPIRATORY (INHALATION) 2 TIMES DAILY
Qty: 12 G | Refills: 11 | Status: SHIPPED | OUTPATIENT
Start: 2024-03-20 | End: 2024-03-20

## 2024-03-20 RX ORDER — CETIRIZINE HYDROCHLORIDE 10 MG/1
10 TABLET ORAL DAILY
COMMUNITY

## 2024-03-20 RX ORDER — OMEPRAZOLE 20 MG/1
20 CAPSULE, DELAYED RELEASE ORAL DAILY
Qty: 90 CAPSULE | Refills: 3 | Status: SHIPPED | OUTPATIENT
Start: 2024-03-20

## 2024-03-20 RX ORDER — LEVOTHYROXINE SODIUM 0.05 MG/1
50 TABLET ORAL
COMMUNITY

## 2024-03-20 NOTE — PATIENT INSTRUCTIONS
Your Due Date is: Estimated Date of Delivery: 9/23/24     As a pregnant patient, if you are having a medical problem please CALL the office [phone] (do not \"Shareable [place] message\") as we want to address your concerns immediately due to the pregnancy.     We share call with another [facility] ([facility]) of physicians - Sharon Onofre, Maribel Bridges, Deann Kidd, Ross Christensen.     We cannot guarantee that you will not see a male provider.     For nausea and vomiting:  -take vitamin B6 (25mg) +/- unisom (aka doxylamine) 12.5mg (this is half of a 25mg tablet) every night to PREVENT morning sickness. Buy these over the counter. Can also take these in the morning and in the afternoon, too, if needed.  -call if this is not effective, we can try a prescription nausea medication    Prenatal vitamin   -make sure it CONTAINS IRON. The gummy vitamins frequently DO NOT CONTAIN IRON.     Low dose aspirin  -recommended if 1 or more risk factors for preeclampsia  -seems to help reduce risk of preeclampsia  -recommended time to start is 11-12 weeks  -current [facility] recommendation is 1.5 tablets of the aspirin 81 mg tab. If you cannot split the tablet you can take 2.     AFP level   There is an optional blood test that we can perform on you called \"AFP level.\" It is a chemical in the bloodstream produced by the pregnancy. It is done between 15-20 weeks gestation. The ideal time for testing is actually 16-18 weeks gestation. If the level is abnormally elevated, this can indicate the presence of abnormalities of the development of the brain and spinal cord such as anencephaly (lack of brain development) and spina bifida (exposed spinal cord). These anomalies can generally be seen on ultrasound. It can also be elevated in cases of normal fetal anatomy and can indicate an abnormal placenta. This may or may not be able to be detected on ultrasound. Please think about whether or not you would like to have this test done.  When you decide when you would like to have this test done, please let us know. We must enter your exact gestational age and weight on the date of the blood draw for the test to be calculated accurately.      Fetal anatomy scan (\"20 week ultrasound\")  -recommend having done with MFM (Maternal Fetal Medicine aka \"High Risk OB\") if higher risk e.g family history of birth defects, chronic hypertension, diabetes, advanced maternal age, etc.     Maternal Fetal Medicine (MFM)  Ben Bravo [place], [place]   [facility]  [address], [address]   Ph [phone]    [place]   [address]  [phone]    Braeden Castro  [address], [address]  Ph [phone]    Joshua  [address], [address]  Ph [phone]     Prenatal classes   [email or web address]    Vaccines during pregnancy  Tdap - recommended each pregnancy in early 3rd trimester  Influenza - recommended each pregnancy during flu season  COVID-19 - new 2023 booster recommended during pregnancy  RSV (respiratory syncytial virus) - [id number] during RSV season, generally corresponding with flu season      [facility]    [address], [address], [place] [address]. Ph: [phone]  [address], [address]nd [place] [address]. Ph: [phone] & [phone]  [address] [place] [address]. Ph [phone]  [address] [address]. Ph [phone]  [address] [place] [address]. Ph [phone]  [address] [place] [address]. Ph [phone]       The Role of Physical Therapy in the Treatment of Pelvic Floor Dysfunction:    Physical therapists are trained to evaluate and treat dysfunctions in the joints, muscles, nerves and scar. Physical therapists specifically trained in the area of pelvic health can identify the possible musculoskeletal causes of pelvic pain, bladder and bowel  difficulties and develop a treatment plan specific to the individual suffering from this difficulties.     What to expect at your first physical therapy appointment:   Your first visit will include an initial evaluation in a comfortable, private room by a therapist who has undergone advanced education and training in the evaluation and treatment of pelvic muscle dysfunction. The therapist will obtain a detailed history of your health, pain and activity limitations. She will also ask you about any bowel, bladder and sexual difficulties as these are in part controlled by the pelvic muscles. The therapist will then take a look at your posture, mobility of your spine and hips and strength and flexibility of pelvic girdle muscles. She will examine any scar tissue and trigger points in the muscles of your pelvic region.     The therapist will also specifically examine the pelvic floor muscles. Your pelvic floor consists of a group of muscles that attach behind the pubic bone in the front to the tail bone in the back. They are responsible for providing support to the pelvic joints and organs, relaxing to allow the passage of urine, stool and gas and karey to prevent the loss of urine, stool and gas as appropriate. In order to best examine these muscles you will be asked to undress from the waist down and be covered with a sheet. The therapist will use a lubricated, gloved finger to identify painful muscles around and in your vagina or rectum then instruct you to contract and relax these muscles in order to determine how the muscles are functioning. Care is taken to make you as comfortable as possible with the exam.     Your therapist will discuss the evaluation results with you and provide you with education regarding your specific condition and the expectation of therapy. She will answer all of your questions and will work with you to establish a treatment plan based on the results of the evaluation and your goals  for therapy.

## 2024-03-20 NOTE — PROGRESS NOTES
MARCELLE    Nausea was bad but manageable.   Since  has had increase in the nausea.   1st thing the morning pretty violent vomiting but after food.   May have had a minor viral thing a few days ago though - had some enlarged LN in neck & was feeling slightly off.   Not taking anything for nausea other than over the counter remedies    BM a little slower but not hard to go. Maybe 2-3 smaller BM per day.   IC - Some bladder pain. Sharp urethral pain at times. Urologist does not know she is pregnant yet   No vaginal bleeding, no cramping.     34 year old  at 13w2d   CHRIS 24 by LMP c/w 7w1d US (IUI pregnancy - Dr. Quang Walker)     Prenatal labs - per patient done at [facility] - no result yet. Plans early glucose test soon     -NIPS negative, gender secret until next week   -Carrier done with Dr. Walker - reviewed records - RSM panel negative results   -AFP desired. ordered  -Flu - - done   -COVID vaccine encouraged     Obesity (pre preg BMI 39)   -limit wt gain 11-20 lb   -baseline A1c & CMP, early 1 hr GTT   -aspirin recommended    -L2 US advised & ordered    -growth US   -NSTs     Hx Prediabetes   -10/4/23  A1c 6.3% pre pregnancy -> prescribed metformin 500 mg daily   -conceived while on Metformin 500 mg ER daily - stopped after 1st OB visit  -early 1 hr GTT & A1c     IUI & Clomid   -ovulatory dysfunction  -partner with 47, XYY karyotype (Sawyer Syndrome) - was still able to use his sperm    Asthma & allergies  -multiple allergies of unknown etiology which manifest as tongue swelling, lip swelling and face swelling. Also gets heart palpitations and GI distress associated with multiple foods   -per pt asthma well controlled, does not have a PCP since she switched to her 's insurance - advise her to follow up  -stopped Advair with positive pregnancy test -> Rx Fluticasone inhaler   -Singular daily, Zyrtec, albuterol PRN  -no recent albuterol use     Subclinical hypothyroidism  -10/4/23 TSH 3.7  (pre-conception) -> Rx levothyroxine 25 mcg   -stopped levothyroxine per [facility] & had additional elevated levels (12/13/23 TSH 4.56 & 1/16/24 5.95)   -[facility]MER restarted levothyroxine 50 mcg daily (increased from 25 mcg dose)   -TSH, free T4 added to PNL     GERD  -omeprazole 20 mg daily     NVP  -Unisom & vitamin B6 discussed    -Rx Reglan     Interstitial cystitis, dyspareunia, pelvic floor dysfunction  -Urologist Dr. 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 Has a fog & some \"purple marks\" in her vision as well. Negative image for up to 5 minutes at its worst. Can get black spots/moving spots. -Neuro-ophthalmologist says her \"filter\" doesn't work in the brain. Was told she could try a seizure medication & may help 30-40% of the time.     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

## 2024-03-21 LAB
ABSOLUTE BASOPHILS: 34 CELLS/UL (ref 0–200)
ABSOLUTE EOSINOPHILS: 468 CELLS/UL (ref 15–500)
ABSOLUTE LYMPHOCYTES: 2372 CELLS/UL (ref 850–3900)
ABSOLUTE MONOCYTES: 561 CELLS/UL (ref 200–950)
ABSOLUTE NEUTROPHILS: 5066 CELLS/UL (ref 1500–7800)
ALBUMIN/GLOBULIN RATIO: 1.4 (CALC) (ref 1–2.5)
ALBUMIN: 3.9 G/DL (ref 3.6–5.1)
ALKALINE PHOSPHATASE: 64 U/L (ref 31–125)
ALT: 12 U/L (ref 6–29)
AST: 15 U/L (ref 10–30)
BASOPHILS: 0.4 %
BILIRUBIN, TOTAL: 0.3 MG/DL (ref 0.2–1.2)
BUN: 8 MG/DL (ref 7–25)
CALCIUM: 9.7 MG/DL (ref 8.6–10.2)
CARBON DIOXIDE: 23 MMOL/L (ref 20–32)
CHLORIDE: 105 MMOL/L (ref 98–110)
CREATININE: 0.66 MG/DL (ref 0.5–0.97)
EGFR: 118 ML/MIN/1.73M2
EOSINOPHILS: 5.5 %
GLOBULIN: 2.7 G/DL (CALC) (ref 1.9–3.7)
GLUCOSE: 108 MG/DL (ref 65–99)
HEMATOCRIT: 37.7 % (ref 35–45)
HEMOGLOBIN A1C: 5.4 % OF TOTAL HGB
HEMOGLOBIN: 12.3 G/DL (ref 11.7–15.5)
LYMPHOCYTES: 27.9 %
MCH: 25.8 PG (ref 27–33)
MCHC: 32.6 G/DL (ref 32–36)
MCV: 79.2 FL (ref 80–100)
MONOCYTES: 6.6 %
MPV: 9.6 FL (ref 7.5–12.5)
NEUTROPHILS: 59.6 %
PLATELET COUNT: 318 THOUSAND/UL (ref 140–400)
POTASSIUM: 3.9 MMOL/L (ref 3.5–5.3)
PROTEIN, TOTAL: 6.6 G/DL (ref 6.1–8.1)
RDW: 14.5 % (ref 11–15)
RED BLOOD CELL COUNT: 4.76 MILLION/UL (ref 3.8–5.1)
RUBELLA ANTIBODY (IGG): 5.75 INDEX
SODIUM: 136 MMOL/L (ref 135–146)
T4, FREE: 1.2 NG/DL (ref 0.8–1.8)
TSH: 1.21 MIU/L
WHITE BLOOD CELL COUNT: 8.5 THOUSAND/UL (ref 3.8–10.8)

## 2024-03-21 RX ORDER — OMEPRAZOLE 20 MG/1
20 CAPSULE, DELAYED RELEASE ORAL DAILY
Qty: 90 CAPSULE | Refills: 3 | OUTPATIENT
Start: 2024-03-21

## 2024-04-06 LAB — GLUCOSE, GESTATIONAL SCREEN (50G)-130 CUTOFF: 160 MG/DL

## 2024-04-08 DIAGNOSIS — O99.810 ABNORMAL MATERNAL GLUCOSE TOLERANCE, ANTEPARTUM (HCC): Primary | ICD-10-CM

## 2024-04-16 LAB
SPECIMEN 1: 75 MG/DL (ref 65–99)
SPECIMEN 2: 128 MG/DL
SPECIMEN 3: 92 MG/DL
SPECIMEN 4: 50 MG/DL

## 2024-04-19 ENCOUNTER — ROUTINE PRENATAL (OUTPATIENT)
Facility: CLINIC | Age: 34
End: 2024-04-19
Payer: COMMERCIAL

## 2024-04-19 VITALS
DIASTOLIC BLOOD PRESSURE: 60 MMHG | HEIGHT: 69 IN | WEIGHT: 263.63 LBS | HEART RATE: 67 BPM | SYSTOLIC BLOOD PRESSURE: 102 MMHG | BODY MASS INDEX: 39.05 KG/M2

## 2024-04-19 DIAGNOSIS — E07.9 THYROID DYSFUNCTION IN PREGNANCY IN SECOND TRIMESTER (HCC): ICD-10-CM

## 2024-04-19 DIAGNOSIS — Z13.0 SCREENING, ANEMIA, DEFICIENCY, IRON: ICD-10-CM

## 2024-04-19 DIAGNOSIS — Z36.1 ANTENATAL SCREENING FOR RAISED ALPHAFETOPROTEIN LEVEL (HCC): ICD-10-CM

## 2024-04-19 DIAGNOSIS — O26.812: Primary | ICD-10-CM

## 2024-04-19 DIAGNOSIS — O99.282 THYROID DYSFUNCTION IN PREGNANCY IN SECOND TRIMESTER (HCC): ICD-10-CM

## 2024-04-19 NOTE — PATIENT INSTRUCTIONS
Magnesium glycinate 500 mg nightly   Wrist splints     [facility] Prenatal Classes (and virtual tour of Labor & Delivery unit)  [email or web address]  [phone]    Pre-registration for the hospital  [email or web address]    Breast pump  -contact your insurance to request them to send an order to us for their preferred medical supply company  Or  -contact [employer] (flyer available on the lobby wall across from reception desk)   [email or web address]    Car seat *MUST* be installed before infant(s) can be leave the hospital    Doctor for baby   *Please select a pediatrician or family medicine provider BEFORE you are admitted to the hospital to give birth.   Pediatrics (birth-18 years of age) or Family Medicine (birth through adulthood)  Make sure that provider accepts your insurance.   Pick a provider that is close to where you live.  If the provider is on staff at [place], the nursing staff will notify them when your infant is born so they are aware to come to the hospital to examine the infant.   If the provider you have chosen is not on staff at [place], a pediatric hospitalist will care for your infant during the hospitalization only. You must arrange the follow up visit with your provider.   After delivery at the hospital follow up visit instructions for baby will be provided prior to discharge.     The baby will not be able to leave the hospital without a follow up visit scheduled.     To establish care:  [email or web address]  Or   [facility] Physician Referral line at [phone]    There are MANY providers available. It would be impossible to list them all, but here are a few popular pediatrics groups in [place]:    [facility] [place] [phone]  [facility] [place] [phone]  [facility] [place] [phone]  [facility] [place]  [phone]  [facility] [phone]  [facility] [phone]    There are also many wonderful independent practitioners as well. We recommend you speak with family, friends, colleagues as personal recommendations can be very helpful.

## 2024-04-19 NOTE — PROGRESS NOTES
MARCELLE    Partner here   +FM - has felt since 2.5 wk. Occasional stretch feelings. No cramping, bleeding, leaking fluid. Just a few headaches - 4 to 5 over a month. Mild. Some carpal tunnel bilaterally.     Eczema on hands is pretty severe. Hasn't seem derm for awhile. Encouraged   Fatigue still there     34 year old  at 17w4d   CHRIS 24 by LMP c/w 7w1d US (IUI pregnancy - Dr. Quang Walker)   O+    FOB Sean - 7'0\"  -NIPS negative, BOY  -Carrier done with Dr. Walker - reviewed records - RSM panel negative results   -AFP desired. Ordered.   -Flu  - done   -COVID vaccine encouraged     Obesity (pre preg BMI 39)   -limit wt gain 11-20 lb   -baseline A1c & CMP ok   -Failed early 1 hr GTT - passed 3 hr GTT but had low value at 3 hr GTT. Didn't get much sleep that night.   -aspirin - taking   -L2 US advised & ordered  - appt 5/10   -growth US   -NSTs     Hx Prediabetes   -10/4/23  A1c 6.3% pre pregnancy -> prescribed metformin 500 mg daily   -conceived while on Metformin 500 mg ER daily - stopped after 1st OB visit  -early 1 hr GTT high but passed early 3 hr GTT  -normal baseline A1c     IUI & Clomid   -ovulatory dysfunction  -partner with 47, XYY karyotype (Sawyer Syndrome) - was still able to use his sperm    Asthma & allergies  -multiple allergies of unknown etiology which manifest as tongue swelling, lip swelling and face swelling. Also gets heart palpitations and GI distress associated with multiple foods   -per pt asthma well controlled, does not have a PCP since she switched to her 's insurance - advise her to follow up  -stopped Advair with positive pregnancy test -> Rx Fluticasone inhaler   -Singular daily, Zyrtec, albuterol PRN  -no recent albuterol use     Subclinical hypothyroidism  -10/4/23 TSH 3.7 (pre-conception) -> Rx levothyroxine 25 mcg   -stopped levothyroxine per [facility] & had additional elevated levels (23 TSH 4.56 & 24 5.95)   -[facility]MER restarted levothyroxine 50 mcg daily  (increased from 25 mcg dose)   --3/20 TSH 1.21   -2nd trim TSH ordered     GERD  -omeprazole 20 mg daily     NVP  -Unisom & vitamin B6 discussed. Rx Reglan     Interstitial cystitis, dyspareunia, pelvic floor dysfunction  -Urologist Dr. 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 Has a fog & some \"purple marks\" in her vision as well. Negative image for up to 5 minutes at its worst. Can get black spots/moving spots. -Neuro-ophthalmologist says her \"filter\" doesn't work in the brain. Was told she could try a seizure medication & may help 30-40% of the time.     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

## 2024-04-24 LAB
ABSOLUTE BASOPHILS: 33 CELLS/UL (ref 0–200)
ABSOLUTE EOSINOPHILS: 300 CELLS/UL (ref 15–500)
ABSOLUTE LYMPHOCYTES: 2442 CELLS/UL (ref 850–3900)
ABSOLUTE MONOCYTES: 566 CELLS/UL (ref 200–950)
ABSOLUTE NEUTROPHILS: 7759 CELLS/UL (ref 1500–7800)
AFP MOM: 1
AFP, SERUM: 32.4 NG/ML
BASOPHILS: 0.3 %
CALC'D GESTATIONAL AGE: 18.1 WEEKS
DONOR EGG: NO
EOSINOPHILS: 2.7 %
FERRITIN: 77 NG/ML (ref 16–154)
HEMATOCRIT: 36.5 % (ref 35–45)
HEMOGLOBIN: 11.9 G/DL (ref 11.7–15.5)
HX OF NEURAL TUBE DEFECTS: NO
INSULIN DEPEND DIABETIC: NO
LYMPHOCYTES: 22 %
MATERNAL WEIGHT: 262 LBS
MCH: 26.3 PG (ref 27–33)
MCHC: 32.6 G/DL (ref 32–36)
MCV: 80.6 FL (ref 80–100)
MONOCYTES: 5.1 %
MPV: 9.9 FL (ref 7.5–12.5)
NEUTROPHILS: 69.9 %
NUMBER OF FETUSES: 1
PLATELET COUNT: 356 THOUSAND/UL (ref 140–400)
PREV PREGNANCY DOWN SYND: NO
RDW: 14.2 % (ref 11–15)
RED BLOOD CELL COUNT: 4.53 MILLION/UL (ref 3.8–5.1)
REPEAT SPECIMEN: NO
TSH W/REFLEX TO FT4: 1.2 MIU/L
VITAMIN B12: 341 PG/ML (ref 200–1100)
WHITE BLOOD CELL COUNT: 11.1 THOUSAND/UL (ref 3.8–10.8)

## 2024-04-26 DIAGNOSIS — E07.9 THYROID DYSFUNCTION IN PREGNANCY IN FIRST TRIMESTER (HCC): ICD-10-CM

## 2024-04-26 DIAGNOSIS — O99.281 THYROID DYSFUNCTION IN PREGNANCY IN FIRST TRIMESTER (HCC): ICD-10-CM

## 2024-04-27 RX ORDER — LEVOTHYROXINE SODIUM 0.05 MG/1
50 TABLET ORAL DAILY
Qty: 90 TABLET | Refills: 0 | Status: SHIPPED | OUTPATIENT
Start: 2024-04-27

## 2024-05-10 ENCOUNTER — OFFICE VISIT (OUTPATIENT)
Dept: PERINATAL CARE | Facility: HOSPITAL | Age: 34
End: 2024-05-10
Attending: OBSTETRICS & GYNECOLOGY
Payer: COMMERCIAL

## 2024-05-10 VITALS
HEIGHT: 69 IN | WEIGHT: 266 LBS | SYSTOLIC BLOOD PRESSURE: 124 MMHG | HEART RATE: 76 BPM | DIASTOLIC BLOOD PRESSURE: 75 MMHG | BODY MASS INDEX: 39.4 KG/M2

## 2024-05-10 DIAGNOSIS — E66.01 MATERNAL MORBID OBESITY, ANTEPARTUM, SECOND TRIMESTER (HCC): ICD-10-CM

## 2024-05-10 DIAGNOSIS — E66.09 OTHER OBESITY DUE TO EXCESS CALORIES AFFECTING PREGNANCY IN FIRST TRIMESTER (HCC): ICD-10-CM

## 2024-05-10 DIAGNOSIS — O99.210 OBESITY AFFECTING PREGNANCY (HCC): Primary | ICD-10-CM

## 2024-05-10 DIAGNOSIS — O99.212 MATERNAL MORBID OBESITY, ANTEPARTUM, SECOND TRIMESTER (HCC): ICD-10-CM

## 2024-05-10 DIAGNOSIS — Z36.89 ENCOUNTER FOR FETAL ANATOMIC SURVEY (HCC): ICD-10-CM

## 2024-05-10 DIAGNOSIS — O99.211 OTHER OBESITY DUE TO EXCESS CALORIES AFFECTING PREGNANCY IN FIRST TRIMESTER (HCC): ICD-10-CM

## 2024-05-10 DIAGNOSIS — O35.09X0: ICD-10-CM

## 2024-05-10 DIAGNOSIS — O09.01: ICD-10-CM

## 2024-05-10 DIAGNOSIS — O99.210 OBESITY AFFECTING PREGNANCY (HCC): ICD-10-CM

## 2024-05-10 PROCEDURE — 76811 OB US DETAILED SNGL FETUS: CPT | Performed by: OBSTETRICS & GYNECOLOGY

## 2024-05-10 RX ORDER — CETIRIZINE HYDROCHLORIDE 10 MG/1
10 TABLET ORAL DAILY
COMMUNITY

## 2024-05-10 NOTE — PROGRESS NOTES
Outpatient Maternal-Fetal Medicine Consultation    Dear Dr. Diaz,    Thank you for requesting ultrasound evaluation and maternal fetal medicine consultation on your patient Mary Huerta.  As you are aware she is a 34 year old female with a Krueger pregnancy at 20w4d.  A maternal-fetal medicine consultation was requested secondary to class II obesity complicating pregnancy and hypothyroidism.  Her prenatal records and labs were reviewed.    She had an early GDM screen which was elevated and passed the 3-hour glucose tolerance test on 4/15/24    In October she had a hemoglobin A1c of 6.3% which is consistent with prediabetes.  At the time of her GDM screening her hemoglobin A1c was 5.4%.    HISTORY  OB History    Para Term  AB Living   3 0 0 0 2 0   SAB IAB Ectopic Multiple Live Births   2 0 0 0 0   Obstetric Comments   2023 - Chemical SAB. Dating uncertain. Conceived right after stopping NuvaRing. Had a lot of bleeding though. Very faint pregnancy test.       Current - Clomid & IUI per [facility] Walker (ovulatory dysfunction). Partner with 47, XYY karyotype (Sawyer Syndrome) - was still able to use his sperm     # 1 - Date: None, Sex: None, Weight: None, GA: None, Type: None, Apgar1: None, Apgar5: None, Living: None, Birth Comments: None    # 2 - Date: 2023, Sex: None, Weight: None, GA: 4w0d, Type: Spontaneous , Apgar1: None, Apgar5: None, Living: Fetal Demise, Birth Comments: Chemical pregnancy right after coming off NuvaRing    # 3 - Date: None, Sex: None, Weight: None, GA: None, Type: None, Apgar1: None, Apgar5: None, Living: None, Birth Comments: None    Past Medical History  The patient  has a past medical history of Abdominal pain (2016), Acute severe asthma (HCC) (2017), Asthma (HCC) (2023), Asthmatic bronchitis (HCC) (2022), BMI 38.0-38.9,adult, Chronic interstitial cystitis (2022), Colon polyp (), Complicated migraine, Dizziness  (2023), Dysphagia, E-coli UTI (2023), Eczema (2021), Ganglion of flexor tendon sheath of right index finger (2023), GERD (gastroesophageal reflux disease), Gross hematuria (2023), History of pelvic ultrasound (10/16/2023), Infertility management (), Lightheadedness (2023), Migraine with aura, Morbid obesity with BMI of 40.0-44.9, adult (HCC) (2023), Numbness and tingling (2023), Pap smear for cervical cancer screening (2023), Prediabetes (10/04/2023), Vertigo, Visual disturbance (2022), and Visual snow syndrome.    Past Surgical History  The patient  has a past surgical history that includes spinal puncture,lumbar,diagnostic (); laparoscopic appendectomy (10/11/2014); laparoscopic cholecystectomy (); sinus surgery   (); other surgical history (Right, 2023); colonoscopy & polypectomy (); removal of ovarian cyst(s) (Right, 10/11/2014); insert contraceptive capsul (); removal of contraceptive capsul (); egd (); cystourethroscopy (10/18/2023); hysterosalpingogram - external referral (2023); tonsillectomy (); and appendectomy (10/11/2014).    Family History  The patient She indicated that her mother is alive. She indicated that her father is alive. She indicated that her sister is alive. She indicated that her maternal grandmother is . She indicated that her maternal grandfather is . She indicated that her paternal grandmother is . She indicated that her paternal grandfather is . She indicated that the status of her neg is unknown.      Medications:   Current Outpatient Medications:     cetirizine 10 MG Oral Tab, Take 1 tablet (10 mg total) by mouth daily., Disp: , Rfl:     Methylcobalamin (B12-ACTIVE OR), Take by mouth., Disp: , Rfl:     levothyroxine 50 MCG Oral Tab, Take 1 tablet (50 mcg total) by mouth daily., Disp: 90 tablet, Rfl: 0    omeprazole 20 MG Oral Capsule Delayed  Release, Take 1 capsule (20 mg total) by mouth daily., Disp: 90 capsule, Rfl: 3    fluticasone furoate 100 MCG/ACT Inhalation Aerosol Powder, Breath Activated, Inhale 1 puff into the lungs daily., Disp: 90 each, Rfl: 11    albuterol (5 MG/ML) 0.5% Inhalation Nebu Soln, Take 0.5 mL (2.5 mg total) by nebulization every 6 (six) hours as needed., Disp: , Rfl:     montelukast 10 MG Oral Tab, Take 1 tablet (10 mg total) by mouth nightly., Disp: , Rfl:     prenatal vitamin with DHA 27-0.8-228 MG Oral Cap, Take 1 capsule by mouth daily., Disp: , Rfl:     Cholecalciferol (VITAMIN D-3) 25 MCG (1000 UT) Oral Cap, Take 6,000 Int'l Units by mouth daily., Disp: , Rfl:     cetirizine 10 MG Oral Tab, Take 1 tablet (10 mg total) by mouth daily., Disp: , Rfl:     metoclopramide 10 MG Oral Tab, Take 1 tablet (10 mg total) by mouth every 6 (six) hours as needed. (Patient not taking: Reported on 5/10/2024), Disp: 30 tablet, Rfl: 3    albuterol (2.5 MG/3ML) 0.083% Inhalation Nebu Soln, Take 3 mL (2.5 mg total) by nebulization every 6 (six) hours as needed., Disp: , Rfl:   Allergies:   Allergies   Allergen Reactions    Cephalexin HIVES    Mold WHEEZING    Nexplanon [Etonogestrel] OTHER (SEE COMMENTS)     Ovarian cysts. Reports had severe migraine & visual issues.    Hydrocodone NAUSEA ONLY and DIZZINESS    Kdc:Acetaminophen+Sorbitan+Propoxyphene DIZZINESS and NAUSEA AND VOMITING    Propoxyphene N-Apap DIZZINESS and NAUSEA ONLY    Mears UNKNOWN    Adhesive Tape RASH     Allergic to paper tape only         PHYSICAL EXAMINATION:  /75 (BP Location: Right arm, Patient Position: Sitting, Cuff Size: adult)   Pulse 76   Ht 5' 9\" (1.753 m)   Wt 266 lb (120.7 kg)   LMP 12/18/2023   BMI 39.28 kg/m²   General: alert and oriented,no acute distress  Abdomen: gravid, soft, non-tender  Extremities: non-tender, no edema    OBSTETRIC ULTRASOUND  The patient had a level 2 ultrasound today which I interpreted the results and reviewed them with  the patient.    Ultrasound Findings:  Left ventriculomegaly noted, measuring 1 cm.  Single IUP in cephalic presentation.    Placenta is posterior.   A 3 vessel cord is noted.  Cardiac activity is present at 148 bpm   g ( 0 lb 14 oz)   MVP is 3.2 cm .     The arotic arch appears normal but the brachiocephalic vessel were not able to be seen due to poor tissue penetration.    Left mild ventriculomegaly; 10.1 mm    The fetal measurements are consistent with the established EDC.  The nasal bone is present. She understands that ultrasound exam cannot exclude genetic abnormalities and that genetic testing is recommended. The limitations of ultrasound were discussed.     Uterus and adnexa appeared normal  today on US    See imaging tab for the compl growth ete US report.    DISCUSSION  During her visit we discussed and reviewed the following issues:  OBESITY:  Her BMI prior to pregnancy was 38.7  Obesity during pregnancy is associated with numerous maternal and  risks.  It is not clear whether obesity is a direct cause of adverse pregnancy outcome or whether the association between obesity and adverse pregnancy outcome is due to factors such as diabetes mellitus.   Data suggest that obese women should be encouraged to undertake a weight reduction program (diet, exercise, behavior modification, and possibly bariatric surgery in some cases) prior to attempting to conceive.            Subfertility in obese women is most commonly related to ovulatory dysfunction, and, in some obese women, the ovulatory dysfunction is related to polycystic ovary syndrome (PCOS). It is also important to note that even among ovulatory women, increasing obesity is associated with decreasing spontaneous pregnancy rates.  The increased risk of miscarriage in obese women may be because such women often have PCOS or isolated insulin resistance.                 Due to its strong association with obesity in the general population, type 2  diabetes mellitus is one of the two most common medical complications of the obese . The increased risk of type 2 diabetes is primarily related to an exaggerated increase in insulin resistance in the obese state. It is reasonable to screen obese gravidas for undiagnosed pregestational diabetes in the first trimester.   Glucose intolerance associated with gestational diabetes generally resolves postpartum; however, obese women with a history of gestational diabetes have a two-fold increased prevalence of subsequent type 2 diabetes.           An association between obesity and hypertensive disorders during pregnancy has been consistently reported.  In particular, maternal weight and BMI are independent risk factors for preeclampsia.             Studies have found that the increased risk of  birth in obese gravidas is primarily associated with obesity-related medical and  complications, rather than an intrinsic predisposition to spontaneous  birth. Prevention of  birth in these patients, therefore, should be directed toward prevention or management of medical and obstetrical complications.               Both prepregnancy obesity and excessive maternal weight gain before or during pregnancy contribute to an increased probability of  delivery.  It has also been hypothesized that obesity may lead to dystocia due to increased soft tissue deposition in the maternal pelvis.    delivery in the obese  is associated with numerous perioperative concerns, including emergency delivery, prolonged incision to delivery interval, blood loss >1000 mL, longer operative times, wound infection, thromboembolism, and endometritis.            Maternal obesity appears to be associated with a small increase in the absolute rate of some congenital anomalies (primarily neural tube defect and cardiac), and the risk may increase with increasing maternal weight.  Level II ultrasound is  advised for women with obesity.  The risk of neural tube defects increased significantly with maternal weight.    The analysis found that overweight and obese pregnant women experienced significantly more stillbirths than normal weight women.  Third trimester  testing is advised.    We discussed the current recommendations for limited gestational weight gain in pregnancy for overweight and obese women.  The [place] [employer] currently recommends that women keep gestational weight gain to between 8-18 lbs.  We discussed the role of mild to moderate exercise, healthy food choices and appropriate portions sized to help achieve this goal.  Excess weight gain is associated with higher rates of gestational diabetes, hypertensive complications, fetal macrosomia and delivery complications.  Women with weight loss or insufficient weight gain have higher rates of small for gestational age infants.    A recent study found that initiating moderate exercise in early pregnancy for obese gravidas significantly reduced the incidence of gestational diabetes, gestational hypertension,  deliveries and C-sections.  In the study, women were assigned to riding a stationary cycle for  30 minutes 3 times per week, keeping HR<140 bpm.  I encouraged Mary to begin moderate exercise such as walking or stationary bike in the pregnancy.    BORDERLINE VENTRICULOMEGALY  The incidence of borderline ventriculomegaly is uncertain.  It is generally defined as atria of the lateral ventricle which measures between 10-12 mm.  Ventriculomegaly has been reported as bilateral or unilateral.  The vast majority of fetuses is in this setting have a normal outcome.  In studies a variety of adverse outcomes however have been reported.  In one series of just over 200 fetuses fetal aneuploidy was noted in 3.8% and undiagnosed cerebral anomalies were present in 4%.  Other in utero undiagnosed malformations were present  8.6% and   death occurred in 3.7%.  Finally, abnormal development was noted in 11.5%.       Management of borderline ventriculomegaly is primarily based upon the presence or absence of other associated congenital anomalies.  In addition, an assessment of the fetal karyotype as well as an assessment for fetal infections associated with hydrocephalus i.e.:  Toxoplasmosis, cytomegalovirus, rubella) is also considered.  If there is suspicion of other underlying brain abnormalities fetal MRI may be useful.  In most cases serial scans are the most important to assess for further progression of ventriculomegaly.     I have offered invasive genetic testing to the patient today and she declined.  I also reviewed that borderline ventriculomegaly may be simply an imaging artifact.  I find this is more frequent amongst fetus is in a non-cephalic presentation as is this fetus.  Hence I will reevaluate fetal growth and the fetal CNS anatomy in 6 weeks.    After our discussion, Mary would like to move forward with a fetal MRI to further evaluate the fetal anatomy.  I explained to her that the corpus callosum and the pericallosal arteries were not able to be evaluated today due to fetal position and for tissue penetration.      IMPRESSION:  IUP at 20w4d  Normal level 2 ultrasound with isolated left ventriculomegaly and suboptimal evaluation of the aortic arch brachiocephalic vessels  Fetal left ventriculomegaly, mild  Class II obesity complicating pregnancy  Infertility, pregnancy conceived with IUI    RECOMMENDATIONS:  Continue care with Dr. Diaz  11-20 lb weight gain for pregnancy  Follow-up growth ultrasound in 4 to 6 weeks then monthly  Weekly NSTs at 36 weeks  Refer to either [facility] or [facility] for fetal MRI due to isolated ventriculomegaly    Total time spent 60 minutes this calendar day which includes preparing to see the patient including chart review, obtaining and/or reviewing additional medical history, performing a  physical exam and evaluation, documenting clinical information in the electronic medical record, independently interpreting results, counseling the patient, communicating results to the patient/family/caregiver and coordinating care.     Case discussed with patient who demonstrated understanding and agreement with plan.     Thank you for allowing me to participate in the care of this patient.  Please feel free to contact me with any questions.    Suzette Peter MD  Maternal-Fetal Medicine       Note to patient and family:  The 21st Century Cures Act makes medical notes available to patients in the interest of transparency.  However, please be advised that this is a medical document.  It is intended as a peer to peer communication.  It is written in medical language and may contain abbreviations or verbiage that are technical and unfamiliar.  It may appear blunt or direct.  Medical documents are intended to carry relevant information, facts as evident, and the clinical opinion of the practitioner.

## 2024-05-10 NOTE — PROGRESS NOTES
Pt here for level II ultrasound/doctor consult  + FM  Pt c/o questionable bladder pain vs uterine cramping, denies vag bleeding and leaking fluid.

## 2024-05-17 ENCOUNTER — ROUTINE PRENATAL (OUTPATIENT)
Facility: CLINIC | Age: 34
End: 2024-05-17

## 2024-05-17 VITALS
SYSTOLIC BLOOD PRESSURE: 100 MMHG | HEART RATE: 78 BPM | DIASTOLIC BLOOD PRESSURE: 67 MMHG | HEIGHT: 69 IN | BODY MASS INDEX: 39.4 KG/M2 | WEIGHT: 266 LBS

## 2024-05-17 DIAGNOSIS — Z34.82 PRENATAL CARE, SUBSEQUENT PREGNANCY IN SECOND TRIMESTER (HCC): Primary | ICD-10-CM

## 2024-05-17 DIAGNOSIS — Z3A.21 21 WEEKS GESTATION OF PREGNANCY (HCC): ICD-10-CM

## 2024-05-17 NOTE — PROGRESS NOTES
Patient has no complaints  -discussed L2U results, patient has follow up appointment, her  is changing jobs and insurance, will make decision after next US if to proceed with MRI  - 1GTT and CBC ordered      CHRIS 9/23/24 by LMP c/w 7w1d US (IUI pregnancy - Dr. Quang Walker)       FOB Sean - 7'0\"  -NIPS negative, BOY  -Carrier done with Dr. Walker - reviewed records - RSM panel negative results   -AFP neg   -Flu 23-24 - done        Obesity (pre preg BMI 39)   -limit wt gain 11-20 lb   -baseline A1c & CMP ok   -Failed early 1 hr GTT - passed 3 hr GTT but had low value at 3 hr GTT. Didn't get much sleep that night.   -aspirin - taking   -L2 US  5/10   IMPRESSION:  IUP at 20w4d  -Normal level 2 ultrasound with isolated left ventriculomegaly and suboptimal evaluation of the aortic arch brachiocephalic vessels  Fetal left ventriculomegaly, mild  Class II obesity complicating pregnancy  Infertility, pregnancy conceived with IUI     RECOMMENDATIONS:  Continue care with Dr. Diaz  11-20 lb weight gain for pregnancy  Follow-up growth ultrasound in 4 to 6 weeks then monthly  Weekly NSTs at 36 weeks  Refer to either [facility] or [facility] for fetal MRI due to isolated ventriculomegaly      Hx Prediabetes   -10/4/23  A1c 6.3% pre pregnancy -> prescribed metformin 500 mg daily   -conceived while on Metformin 500 mg ER daily - stopped after 1st OB visit  -early 1 hr GTT high but passed early 3 hr GTT  -normal baseline A1c     IUI & Clomid   -ovulatory dysfunction  -partner with 47, XYY karyotype (Sawyer Syndrome) - was still able to use his sperm    Asthma & allergies  -multiple allergies of unknown etiology which manifest as tongue swelling, lip swelling and face swelling. Also gets heart palpitations and GI distress associated with multiple foods   -per pt asthma well controlled, does not have a PCP since she switched to her 's insurance - advise her to follow up  -stopped Advair with positive pregnancy test  -> Rx Fluticasone inhaler   -Singular daily, Zyrtec, albuterol PRN  -no recent albuterol use     Subclinical hypothyroidism  -10/4/23 TSH 3.7 (pre-conception) -> Rx levothyroxine 25 mcg   -stopped levothyroxine per [facility] & had additional elevated levels (12/13/23 TSH 4.56 & 1/16/24 5.95)   -[facility] restarted levothyroxine 50 mcg daily (increased from 25 mcg dose)   --3/20 TSH 1.21   -2nd trim TSH - 1.2      GERD  -omeprazole 20 mg daily     Interstitial cystitis, dyspareunia, pelvic floor dysfunction  -Urologist Dr. 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 Has a fog & some \"purple marks\" in her vision as well. Negative image for up to 5 minutes at its worst. Can get black spots/moving spots. -Neuro-ophthalmologist says her \"filter\" doesn't work in the brain. Was told she could try a seizure medication & may help 30-40% of the time.     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

## 2024-06-03 ENCOUNTER — TELEPHONE (OUTPATIENT)
Facility: CLINIC | Age: 34
End: 2024-06-03

## 2024-06-03 ENCOUNTER — TELEPHONE (OUTPATIENT)
Dept: PHYSICAL THERAPY | Facility: HOSPITAL | Age: 34
End: 2024-06-03

## 2024-06-03 NOTE — TELEPHONE ENCOUNTER
Breast Pump order was received from [employer] [facility].  Order form was placed in Dr. Danita Diaz's bin for signature.

## 2024-06-06 ENCOUNTER — TELEPHONE (OUTPATIENT)
Dept: PHYSICAL THERAPY | Facility: HOSPITAL | Age: 34
End: 2024-06-06

## 2024-06-06 ENCOUNTER — OFFICE VISIT (OUTPATIENT)
Dept: PHYSICAL THERAPY | Age: 34
End: 2024-06-06
Attending: OBSTETRICS & GYNECOLOGY
Payer: COMMERCIAL

## 2024-06-06 DIAGNOSIS — N94.10 DYSPAREUNIA, FEMALE: ICD-10-CM

## 2024-06-06 DIAGNOSIS — M62.89 PELVIC FLOOR DYSFUNCTION: ICD-10-CM

## 2024-06-06 DIAGNOSIS — N30.10 CHRONIC INTERSTITIAL CYSTITIS: ICD-10-CM

## 2024-06-06 DIAGNOSIS — O99.891 DISORDER OF MUSCULOSKELETAL SYSTEM DURING PREGNANCY (HCC): Primary | ICD-10-CM

## 2024-06-06 DIAGNOSIS — R39.89 BLADDER PAIN: ICD-10-CM

## 2024-06-06 PROCEDURE — 97161 PT EVAL LOW COMPLEX 20 MIN: CPT

## 2024-06-06 NOTE — PROGRESS NOTES
MUSCULOSKELETAL AND PELVIC FLOOR EVALUATION:     Diagnosis:   Disorder of musculoskeletal system during pregnancy (HCC) (O99.891)  Bladder pain (R39.89)  Dyspareunia, female (N94.10)  Chronic interstitial cystitis (N30.10)  Pelvic floor dysfunction (M62.89)      Referring Provider: Danita Diaz  Date of Evaluation:    6/6/2024    Precautions:   pregnant    Due 9/23   Food allergies  L knee surgery       Slipped disc L5-S1 , T10-T11    Concussion falling off horse 2010.        Next MD visit:   none scheduled  Date of Surgery: n/a     PATIENT SUMMARY   Mary Huerta is a 34 year old female  who presents to therapy today with complaints of  chronic pelvic pain. Pt has history of IC and L lumbar spine pain with radiculopathy. Pt is currently pregnant and will have to wait for any internal work. Plan to work on external hip tightness and lumbar/core strengthening at this time. Will cont with ortho portion of the exam next appt. Please see below for further details.       Current symptoms include: back pain/ nerve sensitivity clitoris/labia     Pt describes pain level: pt reports L sciatica that fluctuates. Chronic at this time. She does know how to work on the piriformis muscle and stretches for relief.       Quality: dull- L LE to toes    Pregnant Now: Yes, due 9/25  Obstetrical/Gynecological history:  first baby   Urodynamic Test: does not retain urine   Manometry: no  Occupation/Activities: clinical massage therapist   PFDI-20: see media /300; Impairment=  see media  %    Mary describes prior level of function I. Pt goals include improve L LE sciatica and low back pain. Begin to decrease hip and PF tone. Will cont with internal portion post baby.  Past medical history was reviewed with Mary. Significant findings include   Past Medical History:    Abdominal pain    Acute severe asthma (HCC)    Asthma (HCC)    Asthmatic bronchitis (HCC)    BMI 38.0-38.9,adult    Chronic interstitial cystitis    Symptoms  since 8 years old but not formally diagnosed until adulthood.    Colon polyp    Complicated migraine    with visual aura, verbal & balance/coordination issues.    Dizziness    Dysphagia    E-coli UTI    Eczema    Ganglion of flexor tendon sheath of right index finger    GERD (gastroesophageal reflux disease)    Gross hematuria    urine culture >100k E.coli    History of pelvic ultrasound    Pelvic US unremarkable    Infertility management    Clomid & IUI to conceive 2024 pregnancy    Lightheadedness    Migraine with aura    Morbid obesity with BMI of 40.0-44.9, adult (HCC)    Numbness and tingling    Rheum visit 1/20/23 - clinical picture, imaging, lab data not consistent with known rheum disorders. lower extremity numbness and tingling, you could consider nerve conduction studies or special testing for small fiber neuropathy (though admittedly, this would not explain vertigo, visual snow or migraine symptoms).    Pap smear for cervical cancer screening    Pap & HPV negative 2/13/23 - [facility]. Per [facility]    Prediabetes    10/4/23 A1c 6.4%    Vertigo    mal de debarquement syndrome. MDDS - frontal lobe communicates incorrectly to vestibular system. Feels likes she is moving when she is not. Went to PT. Helped. Valium helps to stop the symptoms.    Visual disturbance    Visual snow syndrome    reports this developed while on Nexplanon. fuzzy dots in front of her 20/20 vision all the time. Has a fog & some \"purple marks\" in her vision as well. Negative image for up to 5 minutes at its worst. Can get black spots/moving spots.        URINARY HABITS  Types of symptoms:  can have burning post voiding- most likely with IC flare ups   Events associated with the onset of urinary complaints:  does have some stress incontinence  Abdominal/Vaginal Pressure complaints: none   Urinary Frequency: normal   Urine Stream: normal   Amount: normal   Leaking occurs: with certain activities   Episodes of Leakage:   depends on day   Amount of leakage:  dribbles . Does not require pads   Pad use: NA  Pad Change frequency: NA  Nocturia: none   Fluid Intake: water  Bladder irritants:  does watch due to her IC  Urine Stop test: Not assessed   Post void dribble: no  Hovering: no  Empty bladder just in case: no  Do you ever leak urine without knowing it? no    BOWEL HABITS  Types of symptoms:   normal    Frequency of bowel movements: 1x a day   Stool consistency: [place] Stool Scale:  3-4   Do you strain with defecation:  no . Did issued hand for proper breathing techs with defecating.   Big belly open mouth   Laxative use: No    SEXUAL HEALTH STATUS  Marinoff Scale: NA  History of Sexual Abuse: NA  Sexual [place] Status:  active   Pain with initial and/or deep penetration: no- can have some burning sensitivity - mostly with IC flare ups . Reports feeling sore after intercourse.   Pain with pelvic exam/tampon use:  no     ASSESSMENT  Mary presents to physical therapy evaluation with primary c/o stress incontinence and increased PF tone . The results of the objective tests and measures show increased B coccygeal muscles. Will complete an internal exam post partum.   Functional deficits include but are not limited to increased tone in the pelvic floor, soreness following intercourse and stress UI.  Signs and symptoms are consistent with diagnosis of chronic IC, increased PT tone, decreased PF strength. Pt and PT discussed evaluation findings, pathology, POC and HEP.  Pt voiced understanding and performs HEP correctly without reported pain. Skilled Pelvic Physical Therapy is medically necessary to address the above impairments and reach functional goals.    OBJECTIVE:   Posture:  rounded shoulders with forward head posture   Pelvic Alignment:  normal   Deep Tendon Reflexes:  Patella: RNA, LNA;        Achilles: RNA, LNA  Gait: pt ambulates on level ground with normal .     External Palpation:  + L piriformis and the coccygeal  muscles B   Scars (location/surgery): NA  Abdominal Wall Integrity: NA  Diastasis Recti: NA    Range Of Motion  Lumbar AROM screen:  will assess next appt   LE AROM screen: grossly WNL except:  assess next appt     Strength (MMT) 5/5 DONNA LE except not assessed   Transverse Abdominis: NA/5    Flexibility Summary: WNL DONNA LE except NA    Special Tests  NA    Informed consent for internal pelvic evaluation given: yes     External Observation:   Voluntary contraction: NA   Voluntary relaxation: NA  Involuntary contraction: NA  Involuntary relaxation: NA    Mons pubis: NA  Labia majora: NA  Labia minora: NA  Urethral meatus: NA  Introitus: NA  Perineal body: NA    Sensory/Reflex:  Vestibule: NA  Anal [place]: NA    Internal Examination   Scar: NA    Pelvic Floor Muscle strength: (PERF= Power/Endurance/Reps/Fast) MMT: NA  External Anal Sphincter: NA  Accessory Muscle Use: NA    Tissue Laxity Test:  Anterior Wall: NA  Posterior Wall: NA  Apical: NA    Eccentric lengthening contraction: NA  Bearing down Valsalva maneuver (2-3x): NA    Internal Palpation: WNL except NA    Today's Treatment and Response:  side lying L coccygeal muscle   + psoas B L >R  Patient education was provided on objective findings of external and internal evaluation and expectations with treatment outcomes. Educated on  bladder normatives, sleeping positions with sleeping     Patient was instructed in and issued a HEP for: diet/bladder/bowel diary     Charges: PT Eval low ,       Total Timed Treatment: 45 min     Total Treatment Time: 45 min     Based on clinical rationale and outcome measures, this evaluation involved Low Complexity decision making due to 1-2 personal factors/comorbidities, 3 body structures involved/activity limitations, and evolving symptoms including stress urinary incontinence  PLAN OF CARE:    Goals: (to be met in 8 visits)  1.pt will be I with HEP  2. Increase hip mobility  3. Will cont with ortho portion of the eval next appt  4.  Will initiate PF strengthening- pt has to be able to relax post contractions   5. Pt will report 2-3 consecutive days no L lumbar pain     Frequency / Duration: Patient will be seen for 1 x/week or a total of 8 visits over a 90 day period.  Treatment will include:  strengthen the PF and lumbar spine. Manual work for the coccygeal muscles, hip mobility.      Education or treatment limitation: None  Rehab Potential:good      Patient/Family/Caregiver was advised of these findings, precautions, and treatment options and has agreed to actively participate in planning and for this course of care.    Thank you for your referral. Please co-sign or sign and return this letter via fax as soon as possible to [phone]. If you have any questions, please contact me at Dept: [phone]    Sincerely,  Electronically signed by therapist: Cassie Garrido, PT  Physician's certification required: Yes  I certify the need for these services furnished under this plan of treatment and while under my care.    X___________________________________________________ Date____________________    Certification From: 6/6/2024  To:9/4/2024

## 2024-06-07 ENCOUNTER — APPOINTMENT (OUTPATIENT)
Dept: PHYSICAL THERAPY | Age: 34
End: 2024-06-07
Attending: OBSTETRICS & GYNECOLOGY
Payer: COMMERCIAL

## 2024-06-14 ENCOUNTER — APPOINTMENT (OUTPATIENT)
Dept: PHYSICAL THERAPY | Age: 34
End: 2024-06-14
Attending: OBSTETRICS & GYNECOLOGY
Payer: COMMERCIAL

## 2024-06-18 NOTE — PROGRESS NOTES
Outpatient Maternal-Fetal Medicine Follow-Up     Dear Dr. Diaz,     Thank you for requesting ultrasound evaluation and maternal fetal medicine consultation on your patient Mary Huerta.  As you are aware she is a 34 year old female  with a [place] pregnancy and an Estimated Date of Delivery: 24.  She returned to maternal-fetal medicine today for a follow-up visit.  Her history was reviewed from her prior visit and there were no interval changes.     Antepartum Risk Factors  Class II obesity  Fetal left ventriculomegaly      S: good FM.  Wondering about the fetal ventricles and very relieved to learn that the measurements today are normal.     PHYSICAL EXAMINATION:  /68 (BP Location: Right arm, Patient Position: Sitting, Cuff Size: adult)   Pulse 73   Ht 5' 9\" (1.753 m)   Wt 275 lb (124.7 kg)   LMP 2023   BMI 40.61 kg/m²   General: alert and oriented, no acute distress  Abdomen: gravid, soft, non-tender  Extremities: non-tender, no edema     OBSTETRIC ULTRASOUND  The patient had a follow up fetal ultrasound today which I interpreted the results and reviewed them with the patient.    Ultrasound Findings:  Left ventriculomegaly has resolved.  Single IUP in breech presentation.    Placenta is posterior.   A normal is noted.  Cardiac activity is present at 139 bpm  EFW 1118 g ( 2 lb 7 oz); 69%.    MVP is 6.3 cm . KAYLYNN 16.2 cm    The lateral ventricle measurements are normal.  The aortic arch appears normal.    The fetal measurements are consistent with established EDC. No gross ultrasound evidence of structural abnormalities are seen today. The patient understands that ultrasound cannot rule out all structural and chromosomal abnormalities.     See imaging tab for the complete US report.     DISCUSSION  During her visit we discussed and reviewed the following issues:  OBESITY:  Her BMI prior to pregnancy was 38.7  Obesity during pregnancy is associated with numerous maternal and   risks which were discussed previously and reviewed.  Reviewed healthy activity, diet and sleep in pregnancy.  See MFM note from 5/10/24 for detailed review.      BORDERLINE VENTRICULOMEGALY  See prior MFM note from 5/10/2024 for detailed review.    The prior finding of ventriculomegaly has resolved    We discussed the recommended plan of care based on her  risk factors.  Mary and her significant other, Sean, had their questions answered to their satisfaction.        IMPRESSION:  IUP at 26w4d  Normal fetal growth and aortic arch appears normal ultrasound with NORMAL lateral ventricles today  Prior Fetal left ventriculomegaly has RESOLVED  Class II obesity complicating pregnancy  Infertility, pregnancy conceived with IUI    RECOMMENDATIONS:  Continue care with Dr. Diaz  11-20 lb weight gain for pregnancy  Follow-up growth ultrasound every 4 weeks with the addition of a BPP with each growth assessment 32 weeks and beyond   Weekly NSTs at 36 weeks     Total time spent 30 minutes this calendar day which includes preparing to see the patient including chart review, obtaining and/or reviewing additional medical history, performing a physical exam and evaluation, documenting clinical information in the electronic medical record, independently interpreting results, counseling the patient, communicating results to the patient/family/caregiver and coordinating care.     Case discussed with patient who demonstrated understanding and agreement with plan.     Thank you for allowing me to participate in the care of this patient.  Please feel free to contact me with any questions.    Suzette Peter MD  Maternal-Fetal Medicine       Note to patient and family:  The 21st Century Cures Act makes medical notes available to patients in the interest of transparency.  However, please be advised that this is a medical document.  It is intended as a peer to peer communication.  It is written in medical language and  may contain abbreviations or verbiage that are technical and unfamiliar.  It may appear blunt or direct.  Medical documents are intended to carry relevant information, facts as evident, and the clinical opinion of the practitioner.

## 2024-06-21 ENCOUNTER — OFFICE VISIT (OUTPATIENT)
Dept: PERINATAL CARE | Facility: HOSPITAL | Age: 34
End: 2024-06-21
Attending: OBSTETRICS & GYNECOLOGY

## 2024-06-21 ENCOUNTER — OFFICE VISIT (OUTPATIENT)
Dept: PHYSICAL THERAPY | Age: 34
End: 2024-06-21
Attending: OBSTETRICS & GYNECOLOGY
Payer: COMMERCIAL

## 2024-06-21 ENCOUNTER — ROUTINE PRENATAL (OUTPATIENT)
Facility: CLINIC | Age: 34
End: 2024-06-21

## 2024-06-21 VITALS
WEIGHT: 275 LBS | DIASTOLIC BLOOD PRESSURE: 68 MMHG | BODY MASS INDEX: 40.73 KG/M2 | SYSTOLIC BLOOD PRESSURE: 121 MMHG | HEIGHT: 69 IN | HEART RATE: 73 BPM

## 2024-06-21 VITALS
HEIGHT: 69 IN | DIASTOLIC BLOOD PRESSURE: 64 MMHG | WEIGHT: 274.81 LBS | HEART RATE: 70 BPM | SYSTOLIC BLOOD PRESSURE: 122 MMHG | BODY MASS INDEX: 40.7 KG/M2

## 2024-06-21 DIAGNOSIS — Z34.82 PRENATAL CARE, SUBSEQUENT PREGNANCY IN SECOND TRIMESTER (HCC): Primary | ICD-10-CM

## 2024-06-21 DIAGNOSIS — O99.210 OBESITY AFFECTING PREGNANCY (HCC): ICD-10-CM

## 2024-06-21 DIAGNOSIS — O35.09X0: ICD-10-CM

## 2024-06-21 DIAGNOSIS — E66.01 MATERNAL MORBID OBESITY IN THIRD TRIMESTER, ANTEPARTUM (HCC): ICD-10-CM

## 2024-06-21 DIAGNOSIS — O99.213 MATERNAL MORBID OBESITY IN THIRD TRIMESTER, ANTEPARTUM (HCC): Primary | ICD-10-CM

## 2024-06-21 DIAGNOSIS — Z3A.26 26 WEEKS GESTATION OF PREGNANCY (HCC): ICD-10-CM

## 2024-06-21 DIAGNOSIS — E66.01 MATERNAL MORBID OBESITY IN THIRD TRIMESTER, ANTEPARTUM (HCC): Primary | ICD-10-CM

## 2024-06-21 DIAGNOSIS — O99.213 MATERNAL MORBID OBESITY IN THIRD TRIMESTER, ANTEPARTUM (HCC): ICD-10-CM

## 2024-06-21 PROCEDURE — 97140 MANUAL THERAPY 1/> REGIONS: CPT

## 2024-06-21 PROCEDURE — 76816 OB US FOLLOW-UP PER FETUS: CPT | Performed by: OBSTETRICS & GYNECOLOGY

## 2024-06-21 NOTE — PROGRESS NOTES
Patient noticed LE swelling, that improves after rest  - 1GTT and CBC reminded      CHRIS 9/23/24 by LMP c/w 7w1d US (IUI pregnancy - Dr. Quang Walker)       -NIPS negative, BOY  -Carrier done with Dr. Walker - reviewed records - RSM panel negative results   -AFP neg   -Flu 23-24 - done        Obesity (pre preg BMI 39)   -limit wt gain 11-20 lb   -baseline A1c & CMP ok   -Failed early 1 hr GTT - passed 3 hr GTT but had low value at 3 hr GTT. Didn't get much sleep that night.   -aspirin - taking   -L2 US  5/10 left fetal ventriculomegaly noted  - f/u US 6/21  IUP at 26w4d  Normal fetal growth and aortic arch appears normal ultrasound with NORMAL lateral ventricles today  Prior Fetal left ventriculomegaly has RESOLVED  Class II obesity complicating pregnancy  Infertility, pregnancy conceived with IUI     RECOMMENDATIONS:  Continue care with Dr. Diaz  11-20 lb weight gain for pregnancy  Follow-up growth ultrasound every 4 weeks with the addition of a BPP with each growth assessment 32 weeks and beyond   Weekly NSTs at 36 weeks      Hx Prediabetes   -10/4/23  A1c 6.3% pre pregnancy -> prescribed metformin 500 mg daily   -conceived while on Metformin 500 mg ER daily - stopped after 1st OB visit  -early 1 hr GTT high but passed early 3 hr GTT  -normal baseline A1c     IUI & Clomid   -ovulatory dysfunction  -partner with 47, XYY karyotype (Sawyer Syndrome) - was still able to use his sperm    Asthma & allergies  -multiple allergies of unknown etiology which manifest as tongue swelling, lip swelling and face swelling. Also gets heart palpitations and GI distress associated with multiple foods   -per pt asthma well controlled, does not have a PCP since she switched to her 's insurance - advise her to follow up  -stopped Advair with positive pregnancy test -> Rx Fluticasone inhaler   -Singular daily, Zyrtec, albuterol PRN  -no recent albuterol use     Subclinical hypothyroidism  -10/4/23 TSH 3.7 (pre-conception) ->  Rx levothyroxine 25 mcg   -stopped levothyroxine per [facility] & had additional elevated levels (12/13/23 TSH 4.56 & 1/16/24 5.95)   -[facility] restarted levothyroxine 50 mcg daily (increased from 25 mcg dose)   --3/20 TSH 1.21   -2nd trim TSH - 1.2      GERD  -omeprazole 20 mg daily     Interstitial cystitis, dyspareunia, pelvic floor dysfunction  -Urologist Dr. 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 Has a fog & some \"purple marks\" in her vision as well. Negative image for up to 5 minutes at its worst. Can get black spots/moving spots. -Neuro-ophthalmologist says her \"filter\" doesn't work in the brain. Was told she could try a seizure medication & may help 30-40% of the time.     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

## 2024-06-23 NOTE — PROGRESS NOTES
Diagnosis:   Disorder of musculoskeletal system during pregnancy (HCC) (O99.891)  Bladder pain (R39.89)  Dyspareunia, female (N94.10)  Chronic interstitial cystitis (N30.10)  Pelvic floor dysfunction (M62.89)         Referring Provider: Danita iDaz  Date of Evaluation:    6/6/24    Precautions:  due date: 9/23/24  Food allergies  L knee surgery         Slipped disc L5-S1 , T10-T11     Concussion falling off horse 2010.     Next MD visit:   none scheduled  Date of Surgery: n/a   Insurance Primary/Secondary: [employer] INS / N/A     # Auth Visits: 8            Subjective:  pt is feeling better today then at the evaluation. She did have a hard time while on her trip. As she was hiking, she started to have an increased in pelvic and bladder pain and felt like she was going to have an incontinent episode due to the pain. She is still feeling pubic pain.    Pain: 6/10    Pubic pain today - pubic symphysis. Pt is feeling groin pain - may due to round ligament.   Objective:    + coccygeal tenderness, piriformis and the ITB  External Palpation:  + L piriformis and the coccygeal muscles B   Scars (location/surgery): NA      Assessment:  worked on the muscle today with manual techs and cupping of the ITB. Pt did feel some improvement in symptoms post visit. Educatin of using a pillow between the knees and moving with both legs together, getting out of bed and the car for example to avoid pull on the pubic symphysis. Pt is wearing a belt to help with lifting of the abdomin/ belly pressure       Goals:   1.pt will be I with HEP  2. Increase hip mobility  3. Will cont with ortho portion of the eval next appt  4. Will initiate PF strengthening- pt has to be able to relax post contractions   5. Pt will report 2-3 consecutive days no L lumbar pain     Plan: assess how she felt post treatment , inf mobs with belt to improve hip ext with ambulation . Quad position for stretches . Cat and camel   Date: 6/23/2024  TX#: 2/8 Date:                  TX#: 3/ Date:                 TX#: 4/ Date:                 TX#: 5/ Date:   Tx#: 6/   Side lying:  Coccygeal release,   Piriformis STM,   Cupping  B ITB  Home: butterfly stretch, happy baby 3x 30 secs each                             HEP: see above     Charges: MT 3        Total Timed Treatment: 45 min  Total Treatment Time: 45 min

## 2024-06-28 ENCOUNTER — OFFICE VISIT (OUTPATIENT)
Dept: PHYSICAL THERAPY | Age: 34
End: 2024-06-28
Attending: OBSTETRICS & GYNECOLOGY
Payer: COMMERCIAL

## 2024-06-28 PROCEDURE — 97110 THERAPEUTIC EXERCISES: CPT

## 2024-06-28 NOTE — PROGRESS NOTES
Diagnosis:   Disorder of musculoskeletal system during pregnancy ([facility]) (O99.891)  Bladder pain (R39.89)  Dyspareunia, female (N94.10)  Chronic interstitial cystitis (N30.10)  Pelvic floor dysfunction (M62.89)         Referring Provider: Danita Diaz  Date of Evaluation:    6/6/24    Precautions:  due date: 9/23/24  Food allergies  L knee surgery         Slipped disc L5-S1 , T10-T11     Concussion falling off horse 2010.     Next MD visit:   none scheduled  Date of Surgery: n/a   Insurance Primary/Secondary: [employer] INS / N/A     # Auth Visits: 8            Subjective:  pt late coming from glucose test today.moved the wrong way and felt pain in the pubic symphysis.    Pain: 6/10    Pubic pain today - pubic symphysis. Pt is feeling groin pain - may due to round ligament.   Objective:    + coccygeal tenderness, piriformis and the ITB  External Palpation:  + L piriformis and the coccygeal muscles B   Scars (location/surgery): NA      Assessment:  pt is having some after post void. Pt to move around to help. Was able to demo full relaxation and contractions on bio feedback. Loss of endurance after 3-4 seconds. Pt did start to feel like it was taking longer to relax post contractions.   Goals:   1.pt will be I with HEP  2. Increase hip mobility  3. Will cont with ortho portion of the eval next appt  4. Will initiate PF strengthening- pt has to be able to relax post contractions   5. Pt will report 2-3 consecutive days no L lumbar pain     Plan: assess how she felt post treatment , inf mobs with belt to improve hip ext with ambulation . Quad position for stretches . Cat and camel   Date: 6/23/2024  TX#: 2/8 Date:   6/28/24            TX#: 3/8 Date:                 TX#: 4/ Date:                 TX#: 5/ Date:   Tx#: 6/   Side lying:  Coccygeal release,   Piriformis STM,   Cupping  B ITB  Home: butterfly stretch, happy baby 3x 30 secs each  Bio feedback   10 secs   3-4 in 10 secs   Bent leg fall outs 10 x                             HEP: see above     Charges:  EX 2        Total Timed Treatment: 30 min  Total Treatment Time: 30 min

## 2024-06-29 LAB
ABSOLUTE BASOPHILS: 36 CELLS/UL (ref 0–200)
ABSOLUTE EOSINOPHILS: 375 CELLS/UL (ref 15–500)
ABSOLUTE LYMPHOCYTES: 2093 CELLS/UL (ref 850–3900)
ABSOLUTE MONOCYTES: 774 CELLS/UL (ref 200–950)
ABSOLUTE NEUTROPHILS: 8821 CELLS/UL (ref 1500–7800)
BASOPHILS: 0.3 %
EOSINOPHILS: 3.1 %
GLUCOSE, GESTATIONAL SCREEN (50G)-130 CUTOFF: 90 MG/DL
HEMATOCRIT: 37.1 % (ref 35–45)
HEMOGLOBIN: 11.7 G/DL (ref 11.7–15.5)
LYMPHOCYTES: 17.3 %
MCH: 25.8 PG (ref 27–33)
MCHC: 31.5 G/DL (ref 32–36)
MCV: 81.7 FL (ref 80–100)
MONOCYTES: 6.4 %
MPV: 10.3 FL (ref 7.5–12.5)
NEUTROPHILS: 72.9 %
PLATELET COUNT: 330 THOUSAND/UL (ref 140–400)
RDW: 13.7 % (ref 11–15)
RED BLOOD CELL COUNT: 4.54 MILLION/UL (ref 3.8–5.1)
WHITE BLOOD CELL COUNT: 12.1 THOUSAND/UL (ref 3.8–10.8)

## 2024-07-08 ENCOUNTER — ROUTINE PRENATAL (OUTPATIENT)
Facility: CLINIC | Age: 34
End: 2024-07-08
Payer: COMMERCIAL

## 2024-07-08 VITALS
WEIGHT: 273.19 LBS | SYSTOLIC BLOOD PRESSURE: 116 MMHG | HEIGHT: 69 IN | BODY MASS INDEX: 40.46 KG/M2 | HEART RATE: 63 BPM | DIASTOLIC BLOOD PRESSURE: 72 MMHG

## 2024-07-08 DIAGNOSIS — O09.813 PREGNANCY RESULTING FROM ASSISTED REPRODUCTIVE TECHNOLOGY IN THIRD TRIMESTER (HCC): ICD-10-CM

## 2024-07-08 DIAGNOSIS — O26.899 CARPAL TUNNEL SYNDROME DURING PREGNANCY (HCC): ICD-10-CM

## 2024-07-08 DIAGNOSIS — O99.519 MATERNAL ASTHMA COMPLICATING PREGNANCY (HCC): ICD-10-CM

## 2024-07-08 DIAGNOSIS — Z13.0 SCREENING, ANEMIA, DEFICIENCY, IRON: ICD-10-CM

## 2024-07-08 DIAGNOSIS — M62.89 PELVIC FLOOR DYSFUNCTION: ICD-10-CM

## 2024-07-08 DIAGNOSIS — E66.09 OTHER OBESITY DUE TO EXCESS CALORIES AFFECTING PREGNANCY IN THIRD TRIMESTER (HCC): Primary | ICD-10-CM

## 2024-07-08 DIAGNOSIS — O26.893 HEARTBURN DURING PREGNANCY IN THIRD TRIMESTER (HCC): ICD-10-CM

## 2024-07-08 DIAGNOSIS — O99.213 OTHER OBESITY DUE TO EXCESS CALORIES AFFECTING PREGNANCY IN THIRD TRIMESTER (HCC): Primary | ICD-10-CM

## 2024-07-08 DIAGNOSIS — Z13.1 SCREENING FOR DIABETES MELLITUS: ICD-10-CM

## 2024-07-08 DIAGNOSIS — N30.10 CHRONIC INTERSTITIAL CYSTITIS: ICD-10-CM

## 2024-07-08 DIAGNOSIS — G56.00 CARPAL TUNNEL SYNDROME DURING PREGNANCY (HCC): ICD-10-CM

## 2024-07-08 DIAGNOSIS — E07.9 THYROID DYSFUNCTION IN PREGNANCY IN THIRD TRIMESTER (HCC): ICD-10-CM

## 2024-07-08 DIAGNOSIS — R12 HEARTBURN DURING PREGNANCY IN THIRD TRIMESTER (HCC): ICD-10-CM

## 2024-07-08 DIAGNOSIS — Z11.3 SCREENING EXAMINATION FOR STD (SEXUALLY TRANSMITTED DISEASE): ICD-10-CM

## 2024-07-08 DIAGNOSIS — O09.03: ICD-10-CM

## 2024-07-08 DIAGNOSIS — J45.909 MATERNAL ASTHMA COMPLICATING PREGNANCY (HCC): ICD-10-CM

## 2024-07-08 DIAGNOSIS — O99.891 DISORDER OF MUSCULOSKELETAL SYSTEM DURING PREGNANCY (HCC): ICD-10-CM

## 2024-07-08 DIAGNOSIS — O99.283 THYROID DYSFUNCTION IN PREGNANCY IN THIRD TRIMESTER (HCC): ICD-10-CM

## 2024-07-08 RX ORDER — ASPIRIN 81 MG/1
162 TABLET ORAL DAILY
COMMUNITY

## 2024-07-08 NOTE — PROGRESS NOTES
MARCELLE    +FM. No contractions, leaking fluid, vaginal bleeding. Had one HA over the weekend. Was exhausted & some heartburn - situational. Vision is a little worse over the past 12 weeks but nothing alarming (always sees things.) The negative images last longer. Some more light sensitivity. No epigastric pain. Pubic bone pains at times. Some constipation but nothing unusual for her.     Vitals:    24 1209   BP: 116/72   Pulse: 63   Weight: 273 lb 3.2 oz (123.9 kg)   Height: 69\"        34 year old  at 29w0d   CHRIS 24 by LMP c/w 7w1d US (IUI pregnancy - Dr. Quang Walker)   O+    FOB Sean - 7'0\"  -NIPS negative, BOY  -Carrier done with Dr. Walker - reviewed records - RSM panel negative results   -AFP not done - patient was out of range.   -1 hr GTT & CBC ok 24   -Tdap 23   -3rd trimester HIV & syphilis discussed & ordered. Will check ferritin, A1c, TSH as well  -classes, pre-registration, pediatrician, breast pump, car seat discussed     Obesity (pre preg BMI 39)   -limit wt gain 11-20 lb   -baseline A1c & CMP ok   -Failed early 1 hr GTT - passed 3 hr GTT but had low value at 3 hr GTT. Didn't get much sleep that night.   -aspirin - taking   -L2 US 5/10 left ventriculomegaly noted  -Follow up US  at 26w4d - ventriculomegaly RESOLVED  -Follow-up growth ultrasound every 4 weeks with the addition of a BPP with each growth assessment 32 weeks and beyond - ordered    Next appt    -Weekly NSTs at 36 wk     Hx Prediabetes   -10/4/23  A1c 6.3% pre pregnancy -> prescribed metformin 500 mg daily   -conceived while on Metformin 500 mg ER daily - stopped after 1st OB visit  -early 1 hr GTT high but passed early 3 hr GTT  -normal baseline A1c     IUI & Clomid   -ovulatory dysfunction  -partner with 47, XYY karyotype (Sawyer Syndrome) - was still able to use his sperm    Asthma & allergies, eczema   -multiple allergies of unknown etiology which manifest as tongue swelling, lip swelling and face  swelling. Also gets heart palpitations and GI distress associated with multiple foods   -per pt asthma well controlled, does not have a PCP since she switched to her 's insurance - advise her to follow up  -stopped Advair with positive pregnancy test -> Rx Fluticasone inhaler   -Singular daily, Zyrtec, albuterol PRN  -no recent albuterol use     Subclinical hypothyroidism  -10/4/23 TSH 3.7 (pre-conception) -> Rx levothyroxine 25 mcg   -stopped levothyroxine per [facility] & had additional elevated levels (12/13/23 TSH 4.56 & 1/16/24 5.95)   -[facility]MER restarted levothyroxine 50 mcg daily (increased from 25 mcg dose)   --3/20 TSH 1.21   -4/23 TSH 1.2 - 2nd trim  -TSH for 3rd trim ordered     GERD  -omeprazole 20 mg daily     NVP  -Unisom & vitamin B6 discussed. Rx Reglan     Interstitial cystitis, dyspareunia, pelvic floor dysfunction  -Urologist Dr. 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 Has a fog & some \"purple marks\" in her vision as well. Negative image for up to 5 minutes at its worst. Can get black spots/moving spots. -Neuro-ophthalmologist says her \"filter\" doesn't work in the brain. Was told she could try a seizure medication & may help 30-40% of the time.     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

## 2024-07-08 NOTE — PATIENT INSTRUCTIONS
Please have labs done - need to do 3rd trimester HIV & syphilis screen.     [facility] Prenatal Classes (and virtual tour of Labor & Delivery unit)  [email or web address]  [phone]    Pre-registration for the hospital  [email or web address]    Breast pump  -contact your insurance to request them to send an order to us for their preferred medical supply company  Or  -contact [employer] (flyer available on the lobby wall across from reception desk)   [email or web address]    Car seat *MUST* be installed before infant(s) can be leave the hospital    Doctor for baby   *Please select a pediatrician or family medicine provider BEFORE you are admitted to the hospital to give birth.   Pediatrics (birth-18 years of age) or Family Medicine (birth through adulthood)  Make sure that provider accepts your insurance.   Pick a provider that is close to where you live.  If the provider is on staff at [place], the nursing staff will notify them when your infant is born so they are aware to come to the hospital to examine the infant.   If the provider you have chosen is not on staff at [place], a pediatric hospitalist will care for your infant during the hospitalization only. You must arrange the follow up visit with your provider.   After delivery at the hospital follow up visit instructions for baby will be provided prior to discharge.     The baby will not be able to leave the hospital without a follow up visit scheduled.     To establish care:  [email or web address]  Or   [facility] Physician Referral line at [phone]    There are MANY providers available. It would be impossible to list them all, but here are a few popular pediatrics groups in [place]:    [facility] [place] [phone]  [facility] [place] [phone]  [facility] [place] [phone]  [facility]  [place] [phone]  [facility] [place] [phone]  [facility] [phone]    There are also many wonderful independent practitioners as well. We recommend you speak with family, friends, colleagues as personal recommendations can be very helpful.

## 2024-07-11 ENCOUNTER — HOSPITAL ENCOUNTER (OUTPATIENT)
Age: 34
Discharge: HOME | End: 2024-07-11
Payer: COMMERCIAL

## 2024-07-11 ENCOUNTER — TELEPHONE (OUTPATIENT)
Facility: CLINIC | Age: 34
End: 2024-07-11

## 2024-07-11 VITALS
SYSTOLIC BLOOD PRESSURE: 124 MMHG | DIASTOLIC BLOOD PRESSURE: 62 MMHG | WEIGHT: 271 LBS | RESPIRATION RATE: 23 BRPM | TEMPERATURE: 98 F | BODY MASS INDEX: 40.14 KG/M2 | HEART RATE: 70 BPM | OXYGEN SATURATION: 98 % | HEIGHT: 69 IN

## 2024-07-11 DIAGNOSIS — J01.41 ACUTE RECURRENT PANSINUSITIS: Primary | ICD-10-CM

## 2024-07-11 LAB — SARS-COV-2 RNA RESP QL NAA+PROBE: NOT DETECTED

## 2024-07-11 PROCEDURE — 99213 OFFICE O/P EST LOW 20 MIN: CPT | Performed by: NURSE PRACTITIONER

## 2024-07-11 PROCEDURE — U0002 COVID-19 LAB TEST NON-CDC: HCPCS | Performed by: NURSE PRACTITIONER

## 2024-07-11 RX ORDER — AMOXICILLIN AND CLAVULANATE POTASSIUM 875; 125 MG/1; MG/1
1 TABLET, FILM COATED ORAL 2 TIMES DAILY
Qty: 20 TABLET | Refills: 0 | Status: SHIPPED | OUTPATIENT
Start: 2024-07-11 | End: 2024-07-21

## 2024-07-11 NOTE — ED PROVIDER NOTES
Patient Seen in: [facility]      History     Chief Complaint   Patient presents with    Sinus Problem     Stated Complaint: Sinus infection    Subjective:   This is a 34-year-old female who is approximately 29 weeks pregnant with below stated medical history.  Presents to immediate care for 4 days of sinus pain and pressure, ear pain, and headache.  Reports she had a swollen gland on the left side of her neck yesterday.  She has been using nasal saline rinses.  Feels like previous sinus infections.  No fevers or coughing.  Denies any abdominal pain, vaginal bleeding or vaginal discharge.    The history is provided by the patient.           Objective:   Past Medical History:    Abdominal pain    Acute severe asthma (HCC)    Asthma (HCC)    Asthmatic bronchitis (HCC)    BMI 38.0-38.9,adult    Chronic interstitial cystitis    Symptoms since 8 years old but not formally diagnosed until adulthood.    Colon polyp    Complicated migraine    with visual aura, verbal & balance/coordination issues.    Dizziness    Dysphagia    E-coli UTI    Eczema    Ganglion of flexor tendon sheath of right index finger    GERD (gastroesophageal reflux disease)    Gross hematuria    urine culture >100k E.coli    History of pelvic ultrasound    Pelvic US unremarkable    Infertility management    Clomid & IUI to conceive 2024 pregnancy    Lightheadedness    Migraine with aura    Morbid obesity with BMI of 40.0-44.9, adult (HCC)    Numbness and tingling    Rheum visit 1/20/23 - clinical picture, imaging, lab data not consistent with known rheum disorders. lower extremity numbness and tingling, you could consider nerve conduction studies or special testing for small fiber neuropathy (though admittedly, this would not explain vertigo, visual snow or migraine symptoms).    Pap smear for cervical cancer screening    Pap & HPV negative 2/13/23 - Peggy Melgar. Per CareEverywhere    Prediabetes    10/4/23 A1c 6.4%     Vertigo    mal de debarquement syndrome. MDDS - frontal lobe communicates incorrectly to vestibular system. Feels likes she is moving when she is not. Went to PT. Helped. Valium helps to stop the symptoms.    Visual disturbance    Visual snow syndrome    reports this developed while on Nexplanon. fuzzy dots in front of her 20/20 vision all the time. Has a fog & some \"purple marks\" in her vision as well. Negative image for up to 5 minutes at its worst. Can get black spots/moving spots.              Past Surgical History:   Procedure Laterality Date    Appendectomy  10/11/2014    Colonoscopy & polypectomy  2019    Does have polyps. Was told every 3 years    Cystourethroscopy  10/18/2023    Cystoscopy Dr. Ko Flores for gross hematuria. Unremarkable.    Egd  2019    esophagus inflamed. Was having dysphagia & GERD.    Hysterosalpingogram - external referral  11/09/2023    normal cavity. By IVF doctor Quang Walker    Insert contraceptive capsul  2012    Nexplanon    Laparoscopic appendectomy  10/11/2014    not ruptured    Laparoscopic cholecystectomy  2012    +Stones & sludge    Other surgical history Right 08/09/2023    right ringer finger cyst removal    Removal of contraceptive capsul  2014    H/o \"severe allergic reaction\" to birth control (Nexplanon) which consisted of severe migraines, visual issues. Reports this episode occurred after one year with nexplanon. Initially improved after removal of nexplanon implant, but then reoccurred with subsequent menstrual cycle. States these symptoms significantly improved with nuva ring and cessation of menstrual cycles    Removal of ovarian cyst(s) Right 10/11/2014    done at same time of appendectomy. Maybe just functional cyst    Sinus surgery    2019    deviated septum    Spinal puncture,lumbar,diagnostic  2014    She had an LP in 2014 which, per report, showed the following:-- CSF May 2014. Glc 51, protein 34, WBC 0, RBC 6, VDRL negative, OCB negative, opening pressure 14.5  cm in lateral position.    Tonsillectomy  1995                Social History     Socioeconomic History    Marital status:    Tobacco Use    Smoking status: Never     Passive exposure: Never    Smokeless tobacco: Never   Vaping Use    Vaping status: Never Used   Substance and Sexual Activity    Alcohol use: Yes     Comment: rarely    Drug use: Never    Sexual activity: Yes     Partners: Male     Social Determinants of Health     Financial Resource Strain: Low Risk  (5/14/2024)    Financial Resource Strain     Difficulty of Paying Living Expenses: Not hard at all     Med Affordability: No   Food Insecurity: No Food Insecurity (5/14/2024)    Food Insecurity     Food Insecurity: Never true   Transportation Needs: No Transportation Needs (5/14/2024)    Transportation Needs     Lack of Transportation: No   Stress: No Stress Concern Present (5/14/2024)    Stress     Feeling of Stress : No   Housing Stability: Low Risk  (5/14/2024)    Housing Stability     Housing Instability: No              Review of Systems   Constitutional:  Negative for chills and fever.   HENT:  Positive for congestion, ear pain, sinus pressure and sinus pain. Negative for ear discharge and sore throat.    Respiratory:  Negative for cough, shortness of breath and wheezing.    Cardiovascular:  Negative for chest pain, palpitations and leg swelling.   Gastrointestinal:  Negative for abdominal pain, diarrhea, nausea and vomiting.   Genitourinary:  Negative for dysuria.   Musculoskeletal:  Negative for back pain, neck pain and neck stiffness.   Skin:  Negative for rash.   Allergic/Immunologic: Negative for environmental allergies.   Neurological:  Negative for headaches.   Hematological:  Does not bruise/bleed easily.       Positive for stated Chief Complaint: Sinus Problem    Other systems are as noted in HPI.  Constitutional and vital signs reviewed.      All other systems reviewed and negative except as noted above.    Physical Exam     ED  Triage Vitals [07/11/24 1746]   /62   Pulse 70   Resp 23   Temp 98 °F (36.7 °C)   Temp src Temporal   SpO2 98 %   O2 Device None (Room air)       Current Vitals:   Vital Signs  BP: 124/62  Pulse: 70  Resp: 23  Temp: 98 °F (36.7 °C)  Temp src: Temporal    Oxygen Therapy  SpO2: 98 %  O2 Device: None (Room air)            Physical Exam  Vitals and nursing note reviewed.   Constitutional:       General: She is not in acute distress.     Appearance: Normal appearance. She is not ill-appearing, toxic-appearing or diaphoretic.   HENT:      Head: Normocephalic and atraumatic.      Right Ear: Tympanic membrane, ear canal and external ear normal. There is no impacted cerumen.      Left Ear: Tympanic membrane, ear canal and external ear normal. There is no impacted cerumen.      Nose: Congestion and rhinorrhea present.      Mouth/Throat:      Mouth: Mucous membranes are moist.      Pharynx: Oropharynx is clear. No oropharyngeal exudate or posterior oropharyngeal erythema.   Eyes:      General:         Right eye: No discharge.         Left eye: No discharge.      Extraocular Movements: Extraocular movements intact.      Conjunctiva/sclera: Conjunctivae normal.   Cardiovascular:      Rate and Rhythm: Normal rate and regular rhythm.      Heart sounds: Normal heart sounds.   Pulmonary:      Effort: Pulmonary effort is normal. No respiratory distress.      Breath sounds: Normal breath sounds. No stridor. No wheezing, rhonchi or rales.   Chest:      Chest wall: No tenderness.   Musculoskeletal:      Cervical back: Neck supple.      Right lower leg: No edema.      Left lower leg: No edema.   Skin:     General: Skin is warm and dry.      Capillary Refill: Capillary refill takes less than 2 seconds.      Findings: No rash.   Neurological:      Mental Status: She is alert and oriented to person, place, and time.   Psychiatric:         Mood and Affect: Mood normal.         Behavior: Behavior normal.               ED Course     Labs  Reviewed   RAPID SARS-COV-2 BY PCR - Normal             Rapid covid         MDM        Vital signs stable.  Patient is well-appearing and nontoxic looking.  Presents to immediate care for sinus symptoms.    Differential diagnosis include but not limited to COVID, viral sinusitis, bacterial sinusitis, strep, otitis media    Rapid COVID is negative.    She is approximately 29 weeks pregnant with no OB complaints.    Clinical impression is viral versus bacterial sinusitis    DC home.  Watchful waiting prescription of antibiotics given.  Patient was given safe over-the-counter medication list for pregnancy.  Recommended PCP follow-up.  Reasons to return reviewed.  She verbalized understanding, and agreed with plan of care.  All questions were answered.                                     Medical Decision Making      Disposition and Plan     Clinical Impression:  1. Acute recurrent pansinusitis         Disposition:  Discharge  7/11/2024  6:26 pm    Follow-up:  Yin Naylor  [address] [place] [id number] [phone]    In 1 week            Medications Prescribed:  Current Discharge Medication List        START taking these medications    Details   amoxicillin clavulanate 875-125 MG Oral Tab Take 1 tablet by mouth 2 (two) times daily for 10 days.  Qty: 20 tablet, Refills: 0

## 2024-07-11 NOTE — DISCHARGE INSTRUCTIONS
Your COVID test was negative.  Watchful waiting prescription for antibiotics given.  Please use the list of safe OTC medications for cold in pregnancy.  Follow closely with your primary.

## 2024-07-11 NOTE — TELEPHONE ENCOUNTER
Spoke with patient. She feels she has a sinus infection. Medications safe in pregnancy sent through her mychart and instructed to see PRIMARY CARE PROVIDER or urgent care. Understanding verbalized.

## 2024-07-12 ENCOUNTER — OFFICE VISIT (OUTPATIENT)
Dept: PHYSICAL THERAPY | Age: 34
End: 2024-07-12
Attending: OBSTETRICS & GYNECOLOGY
Payer: COMMERCIAL

## 2024-07-12 PROCEDURE — 97140 MANUAL THERAPY 1/> REGIONS: CPT

## 2024-07-14 NOTE — PROGRESS NOTES
Diagnosis:   Disorder of musculoskeletal system during pregnancy ([facility]) (O99.891)  Bladder pain (R39.89)  Dyspareunia, female (N94.10)  Chronic interstitial cystitis (N30.10)  Pelvic floor dysfunction (M62.89)         Referring Provider: Danita Diaz  Date of Evaluation:    6/6/24    Precautions:  due date: 9/23/24  Food allergies  L knee surgery         Slipped disc L5-S1 , T10-T11     Concussion falling off horse 2010.     Next MD visit:   none scheduled  Date of Surgery: n/a   Insurance Primary/Secondary: [employer] INS / N/A     # Auth Visits: 8            Subjective:   does feel that the STM does help with her symptoms. BM every day at this time. Pubic symphysis pain. Belt does increase symptoms when she has attempted to wear one.   Pain: 6/10    Pubic pain today - pubic symphysis. Pt is feeling groin pain - may due to round ligament.   Objective:    + coccygeal tenderness, piriformis and the ITB  External Palpation:  + L piriformis and the coccygeal muscles B   Scars (location/surgery): NA      Assessment:  pt is not able to tolerate and hip abduction exercises, clams due to pubic symphysis pain. Pt does feel improvement with the STM at this time. She is continuing with the PF exercises, stopping if relaxation is more difficult post contractions  Goals:     1.pt will be I with HEP  2. Increase hip mobility  3. Will cont with ortho portion of the eval next appt  4. Will initiate PF strengthening- pt has to be able to relax post contractions   5. Pt will report 2-3 consecutive days no L lumbar pain     Plan: cont with STM and strengthening as tolerated by pt  Date: 6/23/2024  TX#: 2/8 Date:   6/28/24            TX#: 3/8 Date:    7/12/24           TX#: 4/8 Date:                 TX#: 5/ Date:   Tx#: 6/   Side lying:  Coccygeal release,   Piriformis STM,   Cupping  B ITB  Home: butterfly stretch, happy baby 3x 30 secs each  Bio feedback   10 secs   3-4 in 10 secs   Bent leg fall outs 10 x  STM side lying coccygeal  muscles , ITB cupping and piriformis STM   38  mins                           HEP: see above     Charges:  MT 3       Total Timed Treatment: 30 min  Total Treatment Time: 30 min

## 2024-07-19 ENCOUNTER — OFFICE VISIT (OUTPATIENT)
Dept: PHYSICAL THERAPY | Age: 34
End: 2024-07-19
Attending: OBSTETRICS & GYNECOLOGY
Payer: COMMERCIAL

## 2024-07-19 PROCEDURE — 97140 MANUAL THERAPY 1/> REGIONS: CPT

## 2024-07-19 PROCEDURE — 97110 THERAPEUTIC EXERCISES: CPT

## 2024-07-19 NOTE — PROGRESS NOTES
Diagnosis:   Disorder of musculoskeletal system during pregnancy ([facility]) (O99.891)  Bladder pain (R39.89)  Dyspareunia, female (N94.10)  Chronic interstitial cystitis (N30.10)  Pelvic floor dysfunction (M62.89)         Referring Provider: Danita Diaz  Date of Evaluation:    6/6/24    Precautions:  due date: 9/23/24  Food allergies  L knee surgery         Slipped disc L5-S1 , T10-T11     Concussion falling off horse 2010.     Next MD visit:   none scheduled  Date of Surgery: n/a   Insurance Primary/Secondary: [employer]ET[employer] INS / N/A     # Auth Visits: 8            Subjective:   I am feeling more pain in the pubic bone and into the L buttock. Wearing SI belt today.    Pain: 6/10    Pubic pain today - pubic symphysis. Pt is feeling groin pain - may due to round ligament.   Objective:    + coccygeal tenderness, piriformis and the ITB  External Palpation:  + L piriformis and the coccygeal muscles B   Scars (location/surgery): NA      Assessment:   ice and bath seems to help with area. L side more sore then the R.  Pt issued some more stabilization exercises for the SI joint, advised to stop if pubic pain should increase. Pt felt slightly looser post visit. Still increased tone L > R piriformis.     Goals:     1.pt will be I with HEP  2. Increase hip mobility  3. Will cont with ortho portion of the eval next appt  4. Will initiate PF strengthening- pt has to be able to relax post contractions   5. Pt will report 2-3 consecutive days no L lumbar pain     Plan: cont with STM and strengthening as tolerated by pt  Date: 6/23/2024  TX#: 2/8 Date:   6/28/24            TX#: 3/8 Date:    7/12/24           TX#: 4/8 Date:     7/19/24         TX#: 5/8 Date:   Tx#: 6/   Side lying:  Coccygeal release,   Piriformis STM,   Cupping  B ITB  Home: butterfly stretch, happy baby 3x 30 secs each  Bio feedback   10 secs   3-4 in 10 secs   Bent leg fall outs 10 x  STM side lying coccygeal muscles , ITB cupping and piriformis STM   38  mins  Bridge  10 x with PF contractions  Seated ball add with PF 10 x   Blue band :abd  with PF 10 x each   With education 8 mins     Side lying L piriformis / R piriformis  Coccygeal and OI 35 mins                          HEP: see above     Charges:  MT 2  EX 1        Total Timed Treatment:  43 min  Total Treatment Time: 43 min

## 2024-07-23 DIAGNOSIS — E07.9 THYROID DYSFUNCTION IN PREGNANCY IN FIRST TRIMESTER (HCC): ICD-10-CM

## 2024-07-23 DIAGNOSIS — O99.281 THYROID DYSFUNCTION IN PREGNANCY IN FIRST TRIMESTER (HCC): ICD-10-CM

## 2024-07-23 RX ORDER — LEVOTHYROXINE SODIUM 0.05 MG/1
50 TABLET ORAL DAILY
Qty: 90 TABLET | Refills: 0 | Status: SHIPPED | OUTPATIENT
Start: 2024-07-23

## 2024-07-25 PROBLEM — E61.1 IRON DEFICIENCY: Status: ACTIVE | Noted: 2024-07-25

## 2024-07-25 LAB
FERRITIN: 37 NG/ML (ref 16–154)
HEMOGLOBIN A1C: 5.5 % OF TOTAL HGB
T. PALLIDUM AB, EIA: NEGATIVE
TSH W/REFLEX TO FT4: 1.19 MIU/L

## 2024-07-26 ENCOUNTER — OFFICE VISIT (OUTPATIENT)
Dept: PERINATAL CARE | Facility: HOSPITAL | Age: 34
End: 2024-07-26
Attending: OBSTETRICS & GYNECOLOGY
Payer: COMMERCIAL

## 2024-07-26 ENCOUNTER — APPOINTMENT (OUTPATIENT)
Dept: PHYSICAL THERAPY | Age: 34
End: 2024-07-26
Attending: OBSTETRICS & GYNECOLOGY
Payer: COMMERCIAL

## 2024-07-26 ENCOUNTER — ROUTINE PRENATAL (OUTPATIENT)
Facility: CLINIC | Age: 34
End: 2024-07-26
Payer: COMMERCIAL

## 2024-07-26 VITALS
WEIGHT: 279 LBS | BODY MASS INDEX: 41.32 KG/M2 | HEIGHT: 69 IN | DIASTOLIC BLOOD PRESSURE: 74 MMHG | SYSTOLIC BLOOD PRESSURE: 127 MMHG | HEART RATE: 72 BPM

## 2024-07-26 VITALS
HEART RATE: 89 BPM | SYSTOLIC BLOOD PRESSURE: 114 MMHG | DIASTOLIC BLOOD PRESSURE: 78 MMHG | HEIGHT: 69 IN | BODY MASS INDEX: 41.32 KG/M2 | WEIGHT: 279 LBS

## 2024-07-26 DIAGNOSIS — O99.213 OTHER OBESITY DUE TO EXCESS CALORIES AFFECTING PREGNANCY IN THIRD TRIMESTER (HCC): ICD-10-CM

## 2024-07-26 DIAGNOSIS — O09.813 PREGNANCY RESULTING FROM ASSISTED REPRODUCTIVE TECHNOLOGY IN THIRD TRIMESTER (HCC): Primary | ICD-10-CM

## 2024-07-26 DIAGNOSIS — Z3A.31 31 WEEKS GESTATION OF PREGNANCY (HCC): ICD-10-CM

## 2024-07-26 DIAGNOSIS — O09.03: ICD-10-CM

## 2024-07-26 DIAGNOSIS — O09.813 PREGNANCY RESULTING FROM ASSISTED REPRODUCTIVE TECHNOLOGY IN THIRD TRIMESTER (HCC): ICD-10-CM

## 2024-07-26 DIAGNOSIS — Z34.83 PRENATAL CARE, SUBSEQUENT PREGNANCY IN THIRD TRIMESTER (HCC): Primary | ICD-10-CM

## 2024-07-26 DIAGNOSIS — E66.09 OTHER OBESITY DUE TO EXCESS CALORIES AFFECTING PREGNANCY IN THIRD TRIMESTER (HCC): ICD-10-CM

## 2024-07-26 PROCEDURE — 97140 MANUAL THERAPY 1/> REGIONS: CPT

## 2024-07-26 PROCEDURE — 76816 OB US FOLLOW-UP PER FETUS: CPT | Performed by: OBSTETRICS & GYNECOLOGY

## 2024-07-26 PROCEDURE — 97110 THERAPEUTIC EXERCISES: CPT

## 2024-07-26 NOTE — PROGRESS NOTES
Patient feels PT helping her pelvic discomfort     CHRIS 9/23/24 by LMP c/w 7w1d US (IUI pregnancy - Dr. Quang Walker)       -NIPS negative, BOY  -Carrier done with Dr. Walker - reviewed records - RSM panel negative results   -Tdap 12/8/23   -3rd trimester HIV & syphilis  NL  -classes, pre-registration, pediatrician, breast pump, car seat discussed     Obesity (pre preg BMI 39)   -limit wt gain 11-20 lb   -baseline A1c & CMP ok   -aspirin - taking   -L2 US 5/10 left ventriculomegaly noted  -Follow up US 6/21 at 26w4d - ventriculomegaly RESOLVED  -Follow-up growth ultrasound every 4 weeks with the addition of a BPP with each growth assessment 32 weeks  7/26 - NL  -Weekly NSTs at 36 wk     Hx Prediabetes   -10/4/23  A1c 6.3% pre pregnancy -> prescribed metformin 500 mg daily   -conceived while on Metformin 500 mg ER daily - stopped after 1st OB visit  -early 1 hr GTT high but passed early 3 hr GTT  -normal baseline A1c     IUI & Clomid   -ovulatory dysfunction  -partner with 47, XYY karyotype (Sawyer Syndrome) - was still able to use his sperm    Asthma & allergies, eczema   -multiple allergies of unknown etiology which manifest as tongue swelling, lip swelling and face swelling. Also gets heart palpitations and GI distress associated with multiple foods   -per pt asthma well controlled, does not have a PCP since she switched to her 's insurance - advise her to follow up  -stopped Advair with positive pregnancy test -> Rx Fluticasone inhaler   -Singular daily, Zyrtec, albuterol PRN  -no recent albuterol use     Subclinical hypothyroidism  -10/4/23 TSH 3.7 (pre-conception) -> Rx levothyroxine 25 mcg   -stopped levothyroxine per [facility] & had additional elevated levels (12/13/23 TSH 4.56 & 1/16/24 5.95)   -[facility] restarted levothyroxine 50 mcg daily (increased from 25 mcg dose)   --3/20 TSH 1.21   -4/23 TSH 1.2 - 2nd trim  -TSH for 3rd trim 7/24/24 1.19    GERD  -omeprazole 20 mg daily     NVP  -Unisom & vitamin B6  discussed. Rx Reglan     Interstitial cystitis, dyspareunia, pelvic floor dysfunction  -Urologist Dr. 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 Has a fog & some \"purple marks\" in her vision as well. Negative image for up to 5 minutes at its worst. Can get black spots/moving spots. -Neuro-ophthalmologist says her \"filter\" doesn't work in the brain. Was told she could try a seizure medication & may help 30-40% of the time.     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

## 2024-07-26 NOTE — PROGRESS NOTES
Diagnosis:   Disorder of musculoskeletal system during pregnancy ([facility]) (O99.891)  Bladder pain (R39.89)  Dyspareunia, female (N94.10)  Chronic interstitial cystitis (N30.10)  Pelvic floor dysfunction (M62.89)         Referring Provider: Danita Diaz  Date of Evaluation:    6/6/24    Precautions:  due date: 9/23/24  Food allergies  L knee surgery         Slipped disc L5-S1 , T10-T11     Concussion falling off horse 2010.     Next MD visit:   none scheduled  Date of Surgery: n/a   Insurance Primary/Secondary: [employer] / N/A     # Auth Visits: 8            Subjective:   feeling better in the pubic bone. The L low back is sore. Pt did get a new pelvic band-   I feel like the over all spasms are better, focal pain now at the pubic tuberosity L > R  Pain: 5/10    Pubic pain today - pubic symphysis. Pt is feeling groin pain - may due to round ligament.   Objective:    + coccygeal tenderness, piriformis and the ITB  External Palpation:  + L piriformis and the coccygeal muscles B   Scars (location/surgery): NA      Assessment:     issued cat and cow to pt in eleazar draped position.  Pt attempting to walk, however taking small steps for exercise. Wearing her band everyday. She does feel better a day or so after the manual work in PT. Less diffuse symptoms at this time.     Goals:     1.pt will be I with HEP  2. Increase hip mobility  3. Will cont with ortho portion of the eval next appt  4. Will initiate PF strengthening- pt has to be able to relax post contractions   5. Pt will report 2-3 consecutive days no L lumbar pain     Plan: cont with STM and strengthening as tolerated by pt  Date: 6/23/2024  TX#: 2/8 Date:   6/28/24            TX#: 3/8 Date:    7/12/24           TX#: 4/8 Date:     7/19/24         TX#: 5/8 Date7/26/24  Tx#: 6/8   Side lying:  Coccygeal release,   Piriformis STM,   Cupping  B ITB  Home: butterfly stretch, happy baby 3x 30 secs each  Bio feedback   10 secs   3-4 in 10 secs   Bent leg fall outs 10 x   STM side lying coccygeal muscles , ITB cupping and piriformis STM   38  mins  Bridge 10 x with PF contractions  Seated ball add with PF 10 x   Blue band :abd  with PF 10 x each   With education 8 mins     Side lying L piriformis / R piriformis  Coccygeal and OI 35 mins  Cat and camel 10 x 5 sec holds   Standing trunk flexion stretch 2 x 30 secs central and lateral     30 mins  S/L B coccygeal muscles and piriformis   OI external                           HEP: see above     Charges:  MT 2  EX 1        Total Timed Treatment:  43 min  Total Treatment Time: 43 min

## 2024-07-26 NOTE — PROGRESS NOTES
Outpatient Maternal-Fetal Medicine Follow-Up     Dear Dr. Diaz,     Thank you for requesting ultrasound evaluation and maternal fetal medicine consultation on your patient aMry Huerta.  As you are aware she is a 34 year old female  with a Krueger pregnancy and an Estimated Date of Delivery: 24.  She returned to maternal-fetal medicine today for a follow-up visit.  Her history was reviewed from her prior visit and there were no interval changes.     Antepartum Risk Factors  Class II obesity  Fetal left ventriculomegaly      S: good FM.  She had 1 day where she had a high blood pressure then other readings were lower following day she had 1 high blood pressure and the readings are lower.  Since that time she has been checking her blood pressure daily and she is primarily getting 116/70 range.  The highest she had was 144/80 something.    No symptoms of preeclampsia    PHYSICAL EXAMINATION:  /74 (BP Location: Right arm, Patient Position: Sitting, Cuff Size: adult)   Pulse 72   Ht 5' 9\" (1.753 m)   Wt 279 lb (126.6 kg)   LMP 2023   BMI 41.20 kg/m²   General: alert and oriented, no acute distress  Abdomen: gravid, soft, non-tender  Extremities: non-tender, no edema     OBSTETRIC ULTRASOUND  The patient had a follow up fetal ultrasound today which I interpreted the results and reviewed them with the patient.    Ultrasound Findings:    Single IUP in cephalic presentation.    Placenta is posterior.   Cardiac activity is present at 144 bpm  EFW 1943 g ( 4 lb 5 oz); 58%.    MVP is 4.2 cm . KAYLYNN 11.5 cm    The fetal measurements are consistent with established EDC. No gross ultrasound evidence of structural abnormalities are seen today. The patient understands that ultrasound cannot rule out all structural and chromosomal abnormalities.     See imaging tab for the complete US report.     DISCUSSION  During her visit we discussed and reviewed the following issues:  OBESITY:  Her BMI  prior to pregnancy was 38.7  Obesity during pregnancy is associated with numerous maternal and  risks which were discussed previously and reviewed.  Reviewed healthy activity, diet and sleep in pregnancy.  See MFM note from 5/10/24 for detailed review.      BORDERLINE VENTRICULOMEGALY  See prior MFM note from 5/10/2024 for detailed review.    The prior finding of ventriculomegaly has resolved    We discussed the recommended plan of care based on her  risk factors.  Mary and her significant other, Sean, had their questions answered to their satisfaction.      IMPRESSION:  IUP at 31w4d  Normal fetal growth   Prior Fetal left ventriculomegaly has RESOLVED  Class II obesity complicating pregnancy  Infertility, pregnancy conceived with IUI    RECOMMENDATIONS:  Continue care with Dr. Diaz  11-20 lb weight gain for pregnancy  Follow-up growth & BPP ultrasound every 4 weeks   Weekly NSTs at 36 weeks     Total time spent 20 minutes this calendar day which includes preparing to see the patient including chart review, obtaining and/or reviewing additional medical history, performing a physical exam and evaluation, documenting clinical information in the electronic medical record, independently interpreting results, counseling the patient, communicating results to the patient/family/caregiver and coordinating care.     Case discussed with patient who demonstrated understanding and agreement with plan.     Thank you for allowing me to participate in the care of this patient.  Please feel free to contact me with any questions.    Suzette Peter MD  Maternal-Fetal Medicine       Note to patient and family:  The 21st Century Cures Act makes medical notes available to patients in the interest of transparency.  However, please be advised that this is a medical document.  It is intended as a peer to peer communication.  It is written in medical language and may contain abbreviations or verbiage that are technical  and unfamiliar.  It may appear blunt or direct.  Medical documents are intended to carry relevant information, facts as evident, and the clinical opinion of the practitioner.

## 2024-08-08 ENCOUNTER — MOBILE ENCOUNTER (OUTPATIENT)
Dept: OBGYN CLINIC | Facility: CLINIC | Age: 34
End: 2024-08-08

## 2024-08-08 ENCOUNTER — HOSPITAL ENCOUNTER (OUTPATIENT)
Facility: HOSPITAL | Age: 34
Discharge: HOME | End: 2024-08-09
Attending: OBSTETRICS & GYNECOLOGY | Admitting: OBSTETRICS & GYNECOLOGY
Payer: COMMERCIAL

## 2024-08-08 ENCOUNTER — TELEPHONE (OUTPATIENT)
Facility: CLINIC | Age: 34
End: 2024-08-08

## 2024-08-08 NOTE — TELEPHONE ENCOUNTER
33 3/7 brown spotting.     [place] yesterday.    Denies contraction, vaginal water leaking now.     FM+    Pelvic rest, hydration.     Follow up appt tomm. If sign and symptoms persist, having bright red bleeding, cramping, vaginal perineal leaking, decreased FM to call office.     Patient verbalized understanding, agreed to and intend to comply with plan of care.

## 2024-08-09 ENCOUNTER — ROUTINE PRENATAL (OUTPATIENT)
Facility: CLINIC | Age: 34
End: 2024-08-09
Payer: COMMERCIAL

## 2024-08-09 ENCOUNTER — OFFICE VISIT (OUTPATIENT)
Dept: PHYSICAL THERAPY | Age: 34
End: 2024-08-09
Attending: OBSTETRICS & GYNECOLOGY
Payer: COMMERCIAL

## 2024-08-09 VITALS
RESPIRATION RATE: 16 BRPM | HEIGHT: 69 IN | DIASTOLIC BLOOD PRESSURE: 69 MMHG | WEIGHT: 279 LBS | SYSTOLIC BLOOD PRESSURE: 133 MMHG | BODY MASS INDEX: 41.32 KG/M2 | HEART RATE: 60 BPM | TEMPERATURE: 98 F

## 2024-08-09 VITALS
SYSTOLIC BLOOD PRESSURE: 118 MMHG | WEIGHT: 281 LBS | BODY MASS INDEX: 41.62 KG/M2 | HEART RATE: 59 BPM | HEIGHT: 69 IN | DIASTOLIC BLOOD PRESSURE: 68 MMHG

## 2024-08-09 DIAGNOSIS — Z34.90 PRENATAL CARE, ANTEPARTUM (HCC): Primary | ICD-10-CM

## 2024-08-09 LAB
BILIRUB UR QL STRIP.AUTO: NEGATIVE
CLARITY UR REFRACT.AUTO: CLEAR
COLOR UR AUTO: COLORLESS
GLUCOSE UR STRIP.AUTO-MCNC: NORMAL MG/DL
KETONES UR STRIP.AUTO-MCNC: NEGATIVE MG/DL
LEUKOCYTE ESTERASE UR QL STRIP.AUTO: NEGATIVE
NITRITE UR QL STRIP.AUTO: NEGATIVE
PH UR STRIP.AUTO: 6.5 [PH] (ref 5–8)
PROT UR STRIP.AUTO-MCNC: NEGATIVE MG/DL
RBC UR QL AUTO: NEGATIVE
SP GR UR STRIP.AUTO: 1 (ref 1–1.03)
UROBILINOGEN UR STRIP.AUTO-MCNC: NORMAL MG/DL

## 2024-08-09 PROCEDURE — 3074F SYST BP LT 130 MM HG: CPT | Performed by: STUDENT IN AN ORGANIZED HEALTH CARE EDUCATION/TRAINING PROGRAM

## 2024-08-09 PROCEDURE — 59025 FETAL NON-STRESS TEST: CPT

## 2024-08-09 PROCEDURE — 3008F BODY MASS INDEX DOCD: CPT | Performed by: STUDENT IN AN ORGANIZED HEALTH CARE EDUCATION/TRAINING PROGRAM

## 2024-08-09 PROCEDURE — 3078F DIAST BP <80 MM HG: CPT | Performed by: STUDENT IN AN ORGANIZED HEALTH CARE EDUCATION/TRAINING PROGRAM

## 2024-08-09 PROCEDURE — 90715 TDAP VACCINE 7 YRS/> IM: CPT | Performed by: STUDENT IN AN ORGANIZED HEALTH CARE EDUCATION/TRAINING PROGRAM

## 2024-08-09 PROCEDURE — 97140 MANUAL THERAPY 1/> REGIONS: CPT

## 2024-08-09 PROCEDURE — 81003 URINALYSIS AUTO W/O SCOPE: CPT | Performed by: OBSTETRICS & GYNECOLOGY

## 2024-08-09 PROCEDURE — 90471 IMMUNIZATION ADMIN: CPT | Performed by: STUDENT IN AN ORGANIZED HEALTH CARE EDUCATION/TRAINING PROGRAM

## 2024-08-09 PROCEDURE — 99213 OFFICE O/P EST LOW 20 MIN: CPT

## 2024-08-09 NOTE — PROGRESS NOTES
Pt is a 34 year old female admitted to [facility].     Chief Complaint   Patient presents with    R/o  Labor      Pt is  33w4d intra-uterine pregnancy.  History obtained, consents signed. Oriented to room, staff, and plan of care. Pt c/o uterine tightening/cramping all day but more consistent this evening. Reports losing her mucus plug earlier today. States uterine tightening is intermittent and irregular while cramping is more constant, feels like menstrual cramps. Denies LOF, vaginal bleeding.     EFM tested and applied, FHTs tracing and audible in 130s. Abdomen soft and non-tender.

## 2024-08-09 NOTE — PROGRESS NOTES
Discharge instructions given and explained, pt verbalizes understanding and agrees.  labor precautions reviewed. Aware she is to f/u at scheduled appointment this morning. Pt discharged home via wc accompanied by significant other.

## 2024-08-09 NOTE — DISCHARGE INSTRUCTIONS
Discharge Instructions    Diet: Regular  Activity: Normal activity         General Instructions    Call your OB doctor if: Fluid leaking from your vagina;Uterine contractions 10 minutes or closer for 1 to 2 hours;Uterine contractions increasing in intensity and frequency;Decrease in fetal movement;Vaginal bleeding;Temperature greater than 100F     Follow up at next scheduled appointment

## 2024-08-09 NOTE — PROGRESS NOTES
MARCELLE - 33w4d   Pt on L&D overnight for frequent \"tightening\" and several were mildly painful - cervix closed  Pt has been having a lot of tightening in general but is usually not painful  Disucssed labor precautions  Baby is very active        CHRIS 9/23/24 by LMP c/w 7w1d US (IUI pregnancy - Dr. Quang Walker)   -NIPS negative, BOY  -Carrier done with Dr. Walker - reviewed records - RSM panel negative results   -Tdap 12/8/23 - done again 33wk  -1h GTT 2nd tri- normal  -3rd trimester HIV & syphilis  NL  -classes, pre-registration, pediatrician, breast pump, car seat discussed     Obesity (pre preg BMI 39)   -limit wt gain 11-20 lb   -baseline A1c & CMP ok   -aspirin - taking   -L2 US 5/10 left ventriculomegaly noted  -Follow up US 6/21 at 26w4d - ventriculomegaly RESOLVED  -Follow-up growth ultrasound every 4 weeks with the addition of a BPP with each growth assessment   -32 weeks  7/26 - NL, EFW 58%ile, normal fluid, vertex  -Weekly NSTs at 36 wk     Hx Prediabetes   -10/4/23  A1c 6.3% pre pregnancy -> prescribed metformin 500 mg daily   -conceived while on Metformin 500 mg ER daily - stopped after 1st OB visit  -early 1 hr GTT high but passed early 3 hr GTT  -normal baseline A1c     IUI & Clomid   -ovulatory dysfunction  -partner with 47, XYY karyotype (Sawyer Syndrome) - was still able to use his sperm    Asthma & allergies, eczema   -multiple allergies of unknown etiology which manifest as tongue swelling, lip swelling and face swelling. Also gets heart palpitations and GI distress associated with multiple foods   -per pt asthma well controlled, does not have a PCP since she switched to her 's insurance - advise her to follow up  -stopped Advair with positive pregnancy test -> Rx Fluticasone inhaler   -Singular daily, Zyrtec, albuterol PRN  -no recent albuterol use     Subclinical hypothyroidism  -10/4/23 TSH 3.7 (pre-conception) -> Rx levothyroxine 25 mcg   -stopped levothyroxine per [facility] & had additional  elevated levels (12/13/23 TSH 4.56 & 1/16/24 5.95)   -[facility] restarted levothyroxine 50 mcg daily (increased from 25 mcg dose)   --3/20 TSH 1.21   -4/23 TSH 1.2 - 2nd trim  -TSH for 3rd trim 7/24/24 1.19    GERD  -omeprazole 20 mg daily     Interstitial cystitis, dyspareunia, pelvic floor dysfunction  -Urologist Dr. 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 Has a fog & some \"purple marks\" in her vision as well. Negative image for up to 5 minutes at its worst. Can get black spots/moving spots. -Neuro-ophthalmologist says her \"filter\" doesn't work in the brain. Was told she could try a seizure medication & may help 30-40% of the time.     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

## 2024-08-09 NOTE — PROGRESS NOTES
Telephone call through answering service.  Patient 33.3 weeks pregnant complaining of regular cramping and tightening.  Had brown discharge earlier and thinks she lost her mucous plug.  No leaking fluid.  Good fetal movement.  Has been pushing p.o. fluids.  Persistent symptoms for more than a couple hours.  Recommend to labor and delivery for monitoring to rule out  labor

## 2024-08-09 NOTE — NST
Nonstress Test   Patient: Mary Huerta    Gestation: 33w4d    NST:       Variability: Moderate           Accelerations: Yes           Decelerations: Variable            Baseline: 135 BPM           Uterine Irritability: No           Contractions: Irregular                                        Acoustic Stimulator: No           Nonstress Test Interpretation: Reactive           Nonstress Test Second Interpretation: Reactive                     Additional Comments

## 2024-08-09 NOTE — PROGRESS NOTES
Diagnosis:   Disorder of musculoskeletal system during pregnancy ([facility]) (O99.891)  Bladder pain (R39.89)  Dyspareunia, female (N94.10)  Chronic interstitial cystitis (N30.10)  Pelvic floor dysfunction (M62.89)         Referring Provider: Danita Diaz  Date of Evaluation:    6/6/24    Precautions:  due date: 9/23/24  Food allergies  L knee surgery         Slipped disc L5-S1 , T10-T11     Concussion falling off horse 2010.     Next MD visit:   none scheduled  Date of Surgery: n/a   Insurance Primary/Secondary: [employer] / N/A     # Auth Visits: 8            Subjective:  did end up at the hospital last night after losing my mucus plug and having more symptoms- contractions. Everything is ok. Getting more pain in the pubic bone- using my brace still.   Pain: 5/10    Pubic pain today - pubic symphysis. Pt is feeling groin pain - may due to round ligament.   Objective:    + coccygeal tenderness, piriformis and the ITB  External Palpation:  + L piriformis and the coccygeal muscles B   Scars (location/surgery): NA      Assessment:     continued with Manual STM for the LA and the OI B . Pt does feel looser post visit. No radicular pain L LE today, right low back sore pt pt. Review of cat and camel and the standing hip flexor stretch.   Cont with ball add for SI stability     Goals:     1.pt will be I with HEP  2. Increase hip mobility  3. Will cont with ortho portion of the eval next appt  4. Will initiate PF strengthening- pt has to be able to relax post contractions   5. Pt will report 2-3 consecutive days no L lumbar pain     Plan: cont with STM and strengthening as tolerated by pt  Date: 6/23/2024  TX#: 2/8 Date:   6/28/24            TX#: 3/8 Date:    7/12/24           TX#: 4/8 Date:     7/19/24         TX#: 5/8 Date7/26/24  Tx#: 6/8 8/9/24 7/8     Side lying:  Coccygeal release,   Piriformis STM,   Cupping  B ITB  Home: butterfly stretch, happy baby 3x 30 secs each  Bio feedback   10 secs   3-4 in 10 secs   Bent leg  fall outs 10 x  STM side lying coccygeal muscles , ITB cupping and piriformis STM   38  mins  Bridge 10 x with PF contractions  Seated ball add with PF 10 x   Blue band :abd  with PF 10 x each   With education 8 mins     Side lying L piriformis / R piriformis  Coccygeal and OI 35 mins  Cat and camel 10 x 5 sec holds   Standing trunk flexion stretch 2 x 30 secs central and lateral     30 mins  S/L B coccygeal muscles and piriformis   OI external    STM OI and LA  B 18 mins total   Cat and camel 10 x   Flexed trunk flex stretch 2 x 30 secs                            HEP: see above     Charges:  MT 2          Total Timed Treatment:  43 min  Total Treatment Time: 43 min

## 2024-08-15 ENCOUNTER — TELEPHONE (OUTPATIENT)
Dept: PHYSICAL THERAPY | Facility: HOSPITAL | Age: 34
End: 2024-08-15

## 2024-08-16 ENCOUNTER — OFFICE VISIT (OUTPATIENT)
Dept: PHYSICAL THERAPY | Age: 34
End: 2024-08-16
Attending: OBSTETRICS & GYNECOLOGY
Payer: COMMERCIAL

## 2024-08-16 PROCEDURE — 97140 MANUAL THERAPY 1/> REGIONS: CPT

## 2024-08-16 NOTE — PROGRESS NOTES
Diagnosis:   Disorder of musculoskeletal system during pregnancy ([facility]) (O99.891)  Bladder pain (R39.89)  Dyspareunia, female (N94.10)  Chronic interstitial cystitis (N30.10)  Pelvic floor dysfunction (M62.89)         Referring Provider: Danita Diaz  Date of Evaluation:    6/6/24    Precautions:  due date: 9/23/24  Food allergies  L knee surgery         Slipped disc L5-S1 , T10-T11     Concussion falling off horse 2010.     Next MD visit:   none scheduled  Date of Surgery: n/a   Insurance Primary/Secondary: [employer] / N/A     # Auth Visits: 8           Progress Summary  Pt has attended 8 visits in Urogyne Therapy.   Please sign to finish the last scheduled appt for coccygeal/ tailbone pain. Will see her after delivery.   Subjective:   I still feel pain, however I can move much better at this time.     Pain: 5/10    Pubic pain today - pubic symphysis. Pt is feeling groin pain - may due to round ligament.   Objective:    + coccygeal tenderness, piriformis and the ITB  External Palpation:  + L piriformis and the coccygeal muscles B   Scars (location/surgery): NA      Assessment:     continued with Manual STM for the LA and the OI B . Pt does feel looser post visit. No radicular pain L LE today, right low back sore pt pt. Pt does get relief from the manual work. Is not laterally deviating to advance with ambulating. Can  flex and ext the hips to ambulate.   Goals:     1.pt will be I with HEP  2. Increase hip mobility  3. Will cont with ortho portion of the eval next appt  4. Will initiate PF strengthening- pt has to be able to relax post contractions   5. Pt will report 2-3 consecutive days no L lumbar pain     Plan: cont with STM and strengthening as tolerated by pt- will discharge after the next appt. Pt will return post visit  Date: 6/23/2024  TX#: 2/8 Date:   6/28/24            TX#: 3/8 Date:    7/12/24           TX#: 4/8 Date:     7/19/24         TX#: 5/8 Date7/26/24  Tx#: 6/8 8/9/24 7/8 8/16/24 8/8   Side  lying:  Coccygeal release,   Piriformis STM,   Cupping  B ITB  Home: butterfly stretch, happy baby 3x 30 secs each  Bio feedback   10 secs   3-4 in 10 secs   Bent leg fall outs 10 x  STM side lying coccygeal muscles , ITB cupping and piriformis STM   38  mins  Bridge 10 x with PF contractions  Seated ball add with PF 10 x   Blue band :abd  with PF 10 x each   With education 8 mins     Side lying L piriformis / R piriformis  Coccygeal and OI 35 mins  Cat and camel 10 x 5 sec holds   Standing trunk flexion stretch 2 x 30 secs central and lateral     30 mins  S/L B coccygeal muscles and piriformis   OI external    STM OI and LA  B 18 mins total   Cat and camel 10 x   Flexed trunk flex stretch 2 x 30 secs  Side lying STM coccygeal and LA/OI  Cupping piriformis and the ITB B   38 mins total     Will cont with HEP stretches and cat and camel                               HEP: see above     Charges:  MT 3          Total Timed Treatment:  38 min  Total Treatment Time: 38 min

## 2024-08-19 NOTE — PROGRESS NOTES
Outpatient Maternal-Fetal Medicine Follow-Up     Dear Dr. Diaz,     Thank you for requesting ultrasound evaluation and maternal fetal medicine consultation on your patient Mary Huerta.  As you are aware she is a 34 year old female  with a Krue[facility] pregnancy and an Estimated Date of Delivery: 24.  She returned to maternal-fetal medicine today for a follow-up visit.  Her history was reviewed from her prior visit and there were no interval changes.     Antepartum Risk Factors  Class II obesity  Prior Fetal left ventriculomegaly - resolved      S: good FM.  She was evaluated for potential  labor but was ruled out a couple weeks back.  No symptoms of labor at this time    No symptoms of preeclampsia    PHYSICAL EXAMINATION:  /77 (BP Location: Right arm, Patient Position: Sitting, Cuff Size: adult)   Pulse 69   Ht 5' 9\" (1.753 m)   Wt 283 lb (128.4 kg)   LMP 2023   BMI 41.79 kg/m²   General: alert and oriented, no acute distress  Abdomen: gravid, soft, non-tender  Extremities: non-tender, no edema     OBSTETRIC ULTRASOUND  The patient had a follow up fetal ultrasound today which I interpreted the results and reviewed them with the patient.    Ultrasound Findings:    Single IUP in cephalic presentation.    Placenta is posterior.   A 3 vessel cord is noted.  Cardiac activity is present at 135 bpm  EFW 2885 g ( 6 lb 6 oz); 60%.    KAYLYNN is  17.9 cm.  MVP is 5.3 cm  BPP is 8/8.     The fetal measurements are consistent with established EDC. No gross ultrasound evidence of structural abnormalities are seen today. The patient understands that ultrasound cannot rule out all structural and chromosomal abnormalities.     See imaging tab for the complete US report.     DISCUSSION  During her visit we discussed and reviewed the following issues:  OBESITY:  Her BMI prior to pregnancy was 38.7  Obesity during pregnancy is associated with numerous maternal and  risks which were  discussed previously and reviewed.  Reviewed healthy activity, diet and sleep in pregnancy.  See MFM note from 5/10/24 for detailed review.      BORDERLINE VENTRICULOMEGALY  See prior MFM note from 5/10/2024 for detailed review.    The prior finding of ventriculomegaly has resolved    We discussed the recommended plan of care based on her  risk factors.  Mary and her significant other, Sean, had their questions answered to their satisfaction.      IMPRESSION:  IUP at 35w4d  Normal fetal growth &  testing  Class II obesity complicating pregnancy  Infertility, pregnancy conceived with IUI    RECOMMENDATIONS:  Continue care with Dr. Diaz  11-20 lb weight gain for pregnancy  Weekly NSTs at 36 weeks     Total time spent 20 minutes this calendar day which includes preparing to see the patient including chart review, obtaining and/or reviewing additional medical history, performing a physical exam and evaluation, documenting clinical information in the electronic medical record, independently interpreting results, counseling the patient, communicating results to the patient/family/caregiver and coordinating care.     Case discussed with patient who demonstrated understanding and agreement with plan.     Thank you for allowing me to participate in the care of this patient.  Please feel free to contact me with any questions.    Suzette Peter MD  Maternal-Fetal Medicine       Note to patient and family:  The 21st Century Cures Act makes medical notes available to patients in the interest of transparency.  However, please be advised that this is a medical document.  It is intended as a peer to peer communication.  It is written in medical language and may contain abbreviations or verbiage that are technical and unfamiliar.  It may appear blunt or direct.  Medical documents are intended to carry relevant information, facts as evident, and the clinical opinion of the practitioner.

## 2024-08-20 NOTE — PROGRESS NOTES
MARCELLE - visit #9     +FM. Frequent Laron Foster - only a few might hurt. No leaking fluid, vaginal bleeding.   Headache - no      Vision is a little worse over the past 3+ months but nothing alarming (always sees things due to her condition - visual snow syndrome.)The negative images last longer. Some more light sensitivity.     No epigastric pain. Some nausea. Sometimes some heartburn.     Vitals:    24 1131   BP: 116/76   Pulse: 75   Weight: 283 lb (128.4 kg)   Height: 69\"     34 year old  at 35w4d   CHRIS 24 by LMP c/w 7w1d US (IUI pregnancy - Dr. Quang Walker)   O+    FOB Sean - 7'0\"  -NIPS negative, BOY  -Carrier done with Dr. Walker - reviewed records - RSM panel negative results   -1 hr GTT, CBC, 3rd trim HIV & syphilis done.   -Tdap 24  -classes, pre-registration, pediatrician, breast pump, car seat discussed at previous     Delivery planning  -IOL at 39-40 wk advised due to obesity, increased risk stillbirth.   -patient thinks she would be ok with CHRIS. Message to  (patient trying to avoid .     Obesity (pre preg BMI 39)   -limit wt gain 11-20 lb   -baseline A1c & CMP ok   -Failed early 1 hr GTT - passed 3 hr GTT but had low value at 3 hr GTT. Didn't get much sleep that night.   -aspirin - taking  -L2 US 5/10 left ventriculomegaly noted  -Follow up US  at 26w4d - ventriculomegaly RESOLVED  -Follow-up growth ultrasound every 4 weeks with the addition of a BPP with each growth assessment 32 weeks and beyond - ordered     -32 wk - NL, EFW 58%ile, normal fluid, vertex    Next  - cephalic, EFW 60%, KAYLYNN 17.9 cm, BPP     -Weekly NSTs at 36 wk     Hx Prediabetes   -10/4/23  A1c 6.3% pre pregnancy -> prescribed metformin 500 mg daily   -conceived while on Metformin 500 mg ER daily - stopped after 1st OB visit  -early 1 hr GTT high but passed early 3 hr GTT  -normal baseline A1c   -24 A1c 5.5%    IUI & Clomid   -ovulatory dysfunction  -partner with 47, XYY karyotype  (Sawyer Syndrome) - was still able to use his sperm    Asthma & allergies, eczema   -multiple allergies of unknown etiology which manifest as tongue swelling, lip swelling and face swelling. Also gets heart palpitations and GI distress associated with multiple foods   -per pt asthma well controlled, does not have a PCP since she switched to her 's insurance - advise her to follow up  -stopped Advair with positive pregnancy test -> Rx Fluticasone inhaler   -Singular daily, Zyrtec, albuterol PRN  -no recent albuterol use     Subclinical hypothyroidism  -10/4/23 TSH 3.7 (pre-conception) -> Rx levothyroxine 25 mcg   -stopped levothyroxine per [facility] & had additional elevated levels (12/13/23 TSH 4.56 & 1/16/24 5.95)   -[facility] restarted levothyroxine 50 mcg daily (increased from 25 mcg dose)   -3/20 TSH 1.21   -4/23 TSH 1.2 - 2nd trim  -7/24/24 1.19 - 3rd trim    GERD  -omeprazole 20 mg daily     Interstitial cystitis, dyspareunia, pelvic floor dysfunction  -Urologist Dr. 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 Has a fog & some \"purple marks\" in her vision as well. Negative image for up to 5 minutes at its worst. Can get black spots/moving spots. -Neuro-ophthalmologist says her \"filter\" doesn't work in the brain. Was told she could try a seizure medication & may help 30-40% of the time.     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 Extra oral iron advised      RTC 1 wk with NST & GBS

## 2024-08-20 NOTE — PATIENT INSTRUCTIONS
NST (non-stress test)   -This is a test of fetal well-being (to help prevent stillbirth & decrease risk of adverse outcomes)   -Consists of your fetus being put on continuous monitor to record the heart rate pattern  -Average is about 20 minutes but can take longer if fetus is sleeping, mother is dehydrated or hasn't eaten recently.     *Please make sure you eat and come well hydrated to your NST appointments.   *It is also helpful to come at least 15 minutes early so you can be placed on the monitor as soon as you are roomed to get your test started  *The sooner you get on the monitor, hopefully the quicker we can get your test completed     Recommend induction of labor at 39-40 wk due to obesity (a risk factor for stillbirth due to placental dysfunction)    -Fetal lung maturity should be adequate.     -The longer the pregnancy progresses:   Fetus will gain more weight (can be 1/2 lb per week at term) & require more oxygen   Placenta ages & becomes less efficient at delivering oxygen to the fetus   Increased risk for high blood pressure     -\"Larger fetus\" is relative. Fetus has to be compatible with maternal pelvis. No one can predict this. Can only know if fetus is descending through the pelvis during the labor     -There is a point at which the placenta is insufficient to support the fetus through the labor. No one can predict this (in the absence of already concerning  testing)    -A fetus that cannot get its oxygen needs met during labor will manifest fetal distress/intolerance to labor and will require  section if remote from delivery    -A fetus that is larger and/or has broader shoulders/larger chest & abdominal circumference is at increased risk of shoulder dystocia, during which the fetal shoulder gets stuck on the maternal pubic bone. This can result in fetal and maternal injuries.   Potential fetal injuries:  -nerve damage to arm and neck (mainly temporary, rarely permanent)  -broken  arm or clavicle  -low oxygen level that can result in brain damage, cerebral palsy, fetal/ death  Potential maternal injuries:   -third or fourth degree perineal lacerations  -tailbone injury  -need for emergent  section (higher risk for infection, hemorrhage, organ damage)     -A fetus that is too large to pass through the maternal bony pelvis will be delivered via unscheduled  section with higher risk of fetal infection, maternal infection, & maternal hemorrhage.

## 2024-08-21 ENCOUNTER — LAB ENCOUNTER (OUTPATIENT)
Dept: LAB | Age: 34
End: 2024-08-21
Attending: OBSTETRICS & GYNECOLOGY
Payer: COMMERCIAL

## 2024-08-21 PROCEDURE — 36415 COLL VENOUS BLD VENIPUNCTURE: CPT | Performed by: OBSTETRICS & GYNECOLOGY

## 2024-08-21 PROCEDURE — 85025 COMPLETE CBC W/AUTO DIFF WBC: CPT | Performed by: OBSTETRICS & GYNECOLOGY

## 2024-08-23 ENCOUNTER — ROUTINE PRENATAL (OUTPATIENT)
Facility: CLINIC | Age: 34
End: 2024-08-23
Payer: COMMERCIAL

## 2024-08-23 ENCOUNTER — ULTRASOUND ENCOUNTER (OUTPATIENT)
Dept: PERINATAL CARE | Facility: HOSPITAL | Age: 34
End: 2024-08-23
Attending: OBSTETRICS & GYNECOLOGY
Payer: COMMERCIAL

## 2024-08-23 ENCOUNTER — OFFICE VISIT (OUTPATIENT)
Dept: PHYSICAL THERAPY | Age: 34
End: 2024-08-23
Attending: OBSTETRICS & GYNECOLOGY
Payer: COMMERCIAL

## 2024-08-23 ENCOUNTER — TELEPHONE (OUTPATIENT)
Facility: CLINIC | Age: 34
End: 2024-08-23

## 2024-08-23 VITALS
HEART RATE: 69 BPM | BODY MASS INDEX: 41.92 KG/M2 | HEIGHT: 69 IN | WEIGHT: 283 LBS | SYSTOLIC BLOOD PRESSURE: 118 MMHG | DIASTOLIC BLOOD PRESSURE: 77 MMHG

## 2024-08-23 VITALS
BODY MASS INDEX: 41.92 KG/M2 | HEIGHT: 69 IN | WEIGHT: 283 LBS | DIASTOLIC BLOOD PRESSURE: 76 MMHG | HEART RATE: 75 BPM | SYSTOLIC BLOOD PRESSURE: 116 MMHG

## 2024-08-23 DIAGNOSIS — R12 HEARTBURN DURING PREGNANCY IN THIRD TRIMESTER (HCC): ICD-10-CM

## 2024-08-23 DIAGNOSIS — O99.213 OTHER OBESITY DUE TO EXCESS CALORIES AFFECTING PREGNANCY IN THIRD TRIMESTER (HCC): Primary | ICD-10-CM

## 2024-08-23 DIAGNOSIS — O09.03: ICD-10-CM

## 2024-08-23 DIAGNOSIS — E07.9 THYROID DYSFUNCTION IN PREGNANCY IN THIRD TRIMESTER (HCC): ICD-10-CM

## 2024-08-23 DIAGNOSIS — O26.899 CARPAL TUNNEL SYNDROME DURING PREGNANCY (HCC): ICD-10-CM

## 2024-08-23 DIAGNOSIS — O26.893 HEARTBURN DURING PREGNANCY IN THIRD TRIMESTER (HCC): ICD-10-CM

## 2024-08-23 DIAGNOSIS — O99.283 THYROID DYSFUNCTION IN PREGNANCY IN THIRD TRIMESTER (HCC): ICD-10-CM

## 2024-08-23 DIAGNOSIS — O99.891 DISORDER OF MUSCULOSKELETAL SYSTEM DURING PREGNANCY (HCC): ICD-10-CM

## 2024-08-23 DIAGNOSIS — J45.909 MATERNAL ASTHMA COMPLICATING PREGNANCY (HCC): ICD-10-CM

## 2024-08-23 DIAGNOSIS — E66.09 OTHER OBESITY DUE TO EXCESS CALORIES AFFECTING PREGNANCY IN THIRD TRIMESTER (HCC): Primary | ICD-10-CM

## 2024-08-23 DIAGNOSIS — N30.10 CHRONIC INTERSTITIAL CYSTITIS: ICD-10-CM

## 2024-08-23 DIAGNOSIS — G56.00 CARPAL TUNNEL SYNDROME DURING PREGNANCY (HCC): ICD-10-CM

## 2024-08-23 DIAGNOSIS — E66.01 MATERNAL MORBID OBESITY IN THIRD TRIMESTER, ANTEPARTUM (HCC): Primary | ICD-10-CM

## 2024-08-23 DIAGNOSIS — O09.813 PREGNANCY RESULTING FROM ASSISTED REPRODUCTIVE TECHNOLOGY IN THIRD TRIMESTER (HCC): ICD-10-CM

## 2024-08-23 DIAGNOSIS — E66.01 MATERNAL MORBID OBESITY IN THIRD TRIMESTER, ANTEPARTUM (HCC): ICD-10-CM

## 2024-08-23 DIAGNOSIS — O99.519 MATERNAL ASTHMA COMPLICATING PREGNANCY (HCC): ICD-10-CM

## 2024-08-23 DIAGNOSIS — M62.89 PELVIC FLOOR DYSFUNCTION: ICD-10-CM

## 2024-08-23 DIAGNOSIS — H53.19 VISUAL SNOW SYNDROME: ICD-10-CM

## 2024-08-23 DIAGNOSIS — O99.213 MATERNAL MORBID OBESITY IN THIRD TRIMESTER, ANTEPARTUM (HCC): ICD-10-CM

## 2024-08-23 DIAGNOSIS — O99.213 MATERNAL MORBID OBESITY IN THIRD TRIMESTER, ANTEPARTUM (HCC): Primary | ICD-10-CM

## 2024-08-23 PROCEDURE — 76819 FETAL BIOPHYS PROFIL W/O NST: CPT

## 2024-08-23 PROCEDURE — 3008F BODY MASS INDEX DOCD: CPT | Performed by: OBSTETRICS & GYNECOLOGY

## 2024-08-23 PROCEDURE — 3078F DIAST BP <80 MM HG: CPT | Performed by: OBSTETRICS & GYNECOLOGY

## 2024-08-23 PROCEDURE — 76816 OB US FOLLOW-UP PER FETUS: CPT | Performed by: OBSTETRICS & GYNECOLOGY

## 2024-08-23 PROCEDURE — 3074F SYST BP LT 130 MM HG: CPT | Performed by: OBSTETRICS & GYNECOLOGY

## 2024-08-23 PROCEDURE — 97140 MANUAL THERAPY 1/> REGIONS: CPT

## 2024-08-23 RX ORDER — FERROUS SULFATE 325(65) MG
325 TABLET, DELAYED RELEASE (ENTERIC COATED) ORAL EVERY OTHER DAY
COMMUNITY

## 2024-08-23 NOTE — PROGRESS NOTES
Pt here for growth ultrasound  + FM  Pt states feeling [place] Foster ctx daily, denies vag bleeding and leaking fluid.

## 2024-08-23 NOTE — TELEPHONE ENCOUNTER
----- Message from Yelitza REID sent at 8/23/2024 12:16 PM CDT -----  Regarding: FW: Please schedule cytotec PM IOL for obesity at 40 wk. Patient is trying to avoid 9/20 because of a close relative's Bday. Thanks    ----- Message -----  From: Danita Diaz MD  Sent: 8/23/2024  12:15 PM CDT  To: [facility]  Subject: Please schedule cytotec PM IOL for obesity a#

## 2024-08-23 NOTE — PROGRESS NOTES
Diagnosis:   Disorder of musculoskeletal system during pregnancy ([facility]) (O99.891)  Bladder pain (R39.89)  Dyspareunia, female (N94.10)  Chronic interstitial cystitis (N30.10)  Pelvic floor dysfunction (M62.89)         Referring Provider: Danita Diaz  Date of Evaluation:    6/6/24    Precautions:  due date: 9/23/24  Food allergies  L knee surgery         Slipped disc L5-S1 , T10-T11     Concussion falling off horse 2010.     Next MD visit:   none scheduled  Date of Surgery: n/a   Insurance Primary/Secondary: [employer] INS / N/A     # Auth Visits: 8           Discharge Summary  Pt has attended 9 visits in Physical Therapy.           Subjective:   I still feel pain, however I can move much better at this time.     Pain: 5/10    Pubic pain today - pubic symphysis. Pt is feeling groin pain - may due to round ligament.   Objective:    + coccygeal tenderness, piriformis and the ITB  External Palpation:  + L piriformis and the coccygeal muscles B   Scars (location/surgery): NA      Assessment:     continued with Manual STM for the LA and the OI B . Pt does feel looser post visit. No radicular pain L LE today, right low back sore pt pt. Pt does get relief from the manual work. Is not laterally deviating to advance with ambulating. Can  flex and ext the hips to ambulate. Pt will plan on coming back post delivery for internal assessment and increased tone.   Goals:     1.pt will be I with HEP  2. Increase hip mobility  3. Will cont with ortho portion of the eval next appt  4. Will initiate PF strengthening- pt has to be able to relax post contractions   5. Pt will report 2-3 consecutive days no L lumbar pain     Plan:  discharge with HEP   Date: 6/23/2024  TX#: 2/8 Date:   6/28/24            TX#: 3/8 Date:    7/12/24           TX#: 4/8 Date:     7/19/24         TX#: 5/8 Date7/26/24  Tx#: 6/8 8/9/24 7/8 8/16/24 8/8 8/23/24 1/1   Side lying:  Coccygeal release,   Piriformis STM,   Cupping  B ITB  Home: butterfly stretch,  happy baby 3x 30 secs each  Bio feedback   10 secs   3-4 in 10 secs   Bent leg fall outs 10 x  STM side lying coccygeal muscles , ITB cupping and piriformis STM   38  mins  Bridge 10 x with PF contractions  Seated ball add with PF 10 x   Blue band :abd  with PF 10 x each   With education 8 mins     Side lying L piriformis / R piriformis  Coccygeal and OI 35 mins  Cat and camel 10 x 5 sec holds   Standing trunk flexion stretch 2 x 30 secs central and lateral     30 mins  S/L B coccygeal muscles and piriformis   OI external    STM OI and LA  B 18 mins total   Cat and camel 10 x   Flexed trunk flex stretch 2 x 30 secs    Side lying STM coccygeal and LA/OI  Cupping piriformis and the ITB B   38 mins total     Will cont with HEP stretches and cat and camel  MFR OI and LA  Cupping piriformis and the ITB B in side lying  MT 30 mins     Standing trunk flexion stretch 2 x 30 secs                                          HEP: see above     Charges:  MT 2         Total Timed Treatment:  38 min  Total Treatment Time: 38 min

## 2024-08-30 ENCOUNTER — ROUTINE PRENATAL (OUTPATIENT)
Facility: CLINIC | Age: 34
End: 2024-08-30
Payer: COMMERCIAL

## 2024-08-30 VITALS
DIASTOLIC BLOOD PRESSURE: 74 MMHG | HEART RATE: 62 BPM | HEIGHT: 69 IN | SYSTOLIC BLOOD PRESSURE: 116 MMHG | WEIGHT: 284.19 LBS | BODY MASS INDEX: 42.09 KG/M2

## 2024-08-30 DIAGNOSIS — O99.283 THYROID DYSFUNCTION IN PREGNANCY IN THIRD TRIMESTER (HCC): ICD-10-CM

## 2024-08-30 DIAGNOSIS — O09.813 PREGNANCY RESULTING FROM ASSISTED REPRODUCTIVE TECHNOLOGY IN THIRD TRIMESTER (HCC): ICD-10-CM

## 2024-08-30 DIAGNOSIS — O99.519 MATERNAL ASTHMA COMPLICATING PREGNANCY (HCC): ICD-10-CM

## 2024-08-30 DIAGNOSIS — E07.9 THYROID DYSFUNCTION IN PREGNANCY IN THIRD TRIMESTER (HCC): ICD-10-CM

## 2024-08-30 DIAGNOSIS — O09.03: ICD-10-CM

## 2024-08-30 DIAGNOSIS — J45.909 MATERNAL ASTHMA (HCC): ICD-10-CM

## 2024-08-30 DIAGNOSIS — G56.00 CARPAL TUNNEL SYNDROME DURING PREGNANCY (HCC): ICD-10-CM

## 2024-08-30 DIAGNOSIS — H53.19 VISUAL SNOW SYNDROME: ICD-10-CM

## 2024-08-30 DIAGNOSIS — O26.899 CARPAL TUNNEL SYNDROME DURING PREGNANCY (HCC): ICD-10-CM

## 2024-08-30 DIAGNOSIS — R73.03 PREDIABETES: ICD-10-CM

## 2024-08-30 DIAGNOSIS — O99.519 MATERNAL ASTHMA (HCC): ICD-10-CM

## 2024-08-30 DIAGNOSIS — E66.09 OTHER OBESITY DUE TO EXCESS CALORIES AFFECTING PREGNANCY IN THIRD TRIMESTER (HCC): Primary | ICD-10-CM

## 2024-08-30 DIAGNOSIS — O99.213 OTHER OBESITY DUE TO EXCESS CALORIES AFFECTING PREGNANCY IN THIRD TRIMESTER (HCC): Primary | ICD-10-CM

## 2024-08-30 DIAGNOSIS — J45.909 MATERNAL ASTHMA COMPLICATING PREGNANCY (HCC): ICD-10-CM

## 2024-08-30 PROCEDURE — 87150 DNA/RNA AMPLIFIED PROBE: CPT | Performed by: OBSTETRICS & GYNECOLOGY

## 2024-08-30 PROCEDURE — 87081 CULTURE SCREEN ONLY: CPT | Performed by: OBSTETRICS & GYNECOLOGY

## 2024-08-30 NOTE — PROGRESS NOTES
MARCELLE - visit #10    Chief Complaint   Patient presents with    Prenatal Care   Chaperone declines     +FM. Frequent Laron Foster - starting to get more painful. No leaking fluid, vaginal bleeding.   Headache - no      Vision is a little worse over the past 3+ months but nothing alarming (always sees things due to her condition - visual snow syndrome.)The negative images last longer. Some more light sensitivity.     Upper belly just feels bruised off to the right side.   Some nausea. Sometimes some heartburn.     Vitals:    24 1313   BP: 116/74   Pulse: 62   Weight: 284 lb 3.2 oz (128.9 kg)   Height: 69\"     34 year old  at 36w4d   CHRIS 24 by LMP c/w 7w1d US (IUI pregnancy - Dr. Quang Walker)   O+    FOB Sean - 7'0\" - requests bariatric chair for spouse due to tall stature for patient's labor room  -NIPS negative, BOY  -Carrier done with Dr. Walker - reviewed records - RSM panel negative results   -1 hr GTT, CBC, 3rd trim HIV & syphilis done.   -Tdap 24  -classes, pre-registration, pediatrician, breast pump, car seat discussed at previous   -GBS collected. Cervix fingertip/60/-2, mid position, soft     Delivery planning  -IOL at 39-40 wk advised due to obesity, increased risk stillbirth.   -patient thinks she would be ok with CHRIS. Message to  (patient trying to avoid .)  -IOL scheduled  cytotec    Obesity (pre preg BMI 39)   -limit wt gain 11-20 lb   -baseline A1c & CMP ok   -Failed early 1 hr GTT - passed 3 hr GTT but had low value at 3 hr GTT. Didn't get much sleep that night.   -aspirin - taking  -L2 US 5/10 left ventriculomegaly noted  -Follow up US  at 26w4d - ventriculomegaly RESOLVED  -Follow-up growth ultrasound every 4 weeks with the addition of a BPP with each growth assessment 32 weeks and beyond - ordered     -32 wk - NL, EFW 58%ile, normal fluid, vertex     - cephalic, EFW 60%, KAYLYNN 17.9 cm, BPP     -Weekly NSTs at 36 wk     Hx Prediabetes   -10/4/23   A1c 6.3% pre pregnancy -> prescribed metformin 500 mg daily   -conceived while on Metformin 500 mg ER daily - stopped after 1st OB visit  -early 1 hr GTT high but passed early 3 hr GTT  -normal baseline A1c   -7/24/24 A1c 5.5%    IUI & Clomid   -ovulatory dysfunction  -partner with 47, XYY karyotype (Sawyer Syndrome) - was still able to use his sperm    Asthma & allergies, eczema   -multiple allergies of unknown etiology which manifest as tongue swelling, lip swelling and face swelling. Also gets heart palpitations and GI distress associated with multiple foods   -per pt asthma well controlled, does not have a PCP since she switched to her 's insurance - advise her to follow up  -stopped Advair with positive pregnancy test -> Rx Fluticasone inhaler   -Singular daily, Zyrtec, albuterol PRN  -no recent albuterol use     Subclinical hypothyroidism  -10/4/23 TSH 3.7 (pre-conception) -> Rx levothyroxine 25 mcg   -stopped levothyroxine per [facility] & had additional elevated levels (12/13/23 TSH 4.56 & 1/16/24 5.95)   -[facility]MER restarted levothyroxine 50 mcg daily (increased from 25 mcg dose)   -3/20 TSH 1.21   -4/23 TSH 1.2 - 2nd trim  -7/24/24 1.19 - 3rd trim    GERD  -omeprazole 20 mg daily     Interstitial cystitis, dyspareunia, pelvic floor dysfunction  -Urologist Dr. 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 Has a fog & some \"purple marks\" in her vision as well. Negative image for up to 5 minutes at its worst. Can get black spots/moving spots. -Neuro-ophthalmologist says her \"filter\" doesn't work in the brain. Was told she  could try a seizure medication & may help 30-40% of the time.     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 Extra oral iron advised      RTC 1 wk with NST

## 2024-09-01 LAB — GROUP B STREP BY PCR FOR PCR OVT: NEGATIVE

## 2024-09-05 ENCOUNTER — ROUTINE PRENATAL (OUTPATIENT)
Facility: CLINIC | Age: 34
End: 2024-09-05
Payer: COMMERCIAL

## 2024-09-05 VITALS
HEART RATE: 61 BPM | DIASTOLIC BLOOD PRESSURE: 78 MMHG | HEIGHT: 69 IN | BODY MASS INDEX: 42.16 KG/M2 | WEIGHT: 284.63 LBS | SYSTOLIC BLOOD PRESSURE: 120 MMHG

## 2024-09-05 DIAGNOSIS — E66.09 OTHER OBESITY DUE TO EXCESS CALORIES AFFECTING PREGNANCY IN THIRD TRIMESTER (HCC): ICD-10-CM

## 2024-09-05 DIAGNOSIS — O99.283 THYROID DYSFUNCTION IN PREGNANCY IN THIRD TRIMESTER (HCC): ICD-10-CM

## 2024-09-05 DIAGNOSIS — E07.9 THYROID DYSFUNCTION IN PREGNANCY IN THIRD TRIMESTER (HCC): ICD-10-CM

## 2024-09-05 DIAGNOSIS — Z3A.37 37 WEEKS GESTATION OF PREGNANCY (HCC): ICD-10-CM

## 2024-09-05 DIAGNOSIS — Z34.90 PRENATAL CARE, ANTEPARTUM (HCC): Primary | ICD-10-CM

## 2024-09-05 DIAGNOSIS — O99.519 MATERNAL ASTHMA COMPLICATING PREGNANCY (HCC): ICD-10-CM

## 2024-09-05 DIAGNOSIS — J45.909 MATERNAL ASTHMA COMPLICATING PREGNANCY (HCC): ICD-10-CM

## 2024-09-05 DIAGNOSIS — O99.213 OTHER OBESITY DUE TO EXCESS CALORIES AFFECTING PREGNANCY IN THIRD TRIMESTER (HCC): ICD-10-CM

## 2024-09-05 DIAGNOSIS — O09.03: ICD-10-CM

## 2024-09-05 PROCEDURE — 3008F BODY MASS INDEX DOCD: CPT | Performed by: STUDENT IN AN ORGANIZED HEALTH CARE EDUCATION/TRAINING PROGRAM

## 2024-09-05 PROCEDURE — 3074F SYST BP LT 130 MM HG: CPT | Performed by: STUDENT IN AN ORGANIZED HEALTH CARE EDUCATION/TRAINING PROGRAM

## 2024-09-05 PROCEDURE — 3078F DIAST BP <80 MM HG: CPT | Performed by: STUDENT IN AN ORGANIZED HEALTH CARE EDUCATION/TRAINING PROGRAM

## 2024-09-05 PROCEDURE — 59025 FETAL NON-STRESS TEST: CPT | Performed by: STUDENT IN AN ORGANIZED HEALTH CARE EDUCATION/TRAINING PROGRAM

## 2024-09-05 NOTE — PROGRESS NOTES
MARCELLE - visit #11    Chief Complaint   Patient presents with    Prenatal Care   Chaperone declines     +FM. +bh.  No LOF or VB.  Noticing more discharge    Vitals:    24 1057   BP: 120/78   Pulse: 61   Weight: 284 lb 9.6 oz (129.1 kg)   Height: 69\"     34 year old   CHRIS 24 by LMP c/w 7w1d US (IUI pregnancy - Dr. Quang Walker)   O+    FOB Sean - 7'0\" - requests bariatric chair for spouse due to tall stature for patient's labor room  -NIPS negative, BOY  -Carrier done with Dr. Walker - reviewed records - RSM panel negative results   -1 hr GTT, CBC, 3rd trim HIV & syphilis done.   -Tdap 24  -classes, pre-registration, pediatrician, breast pump, car seat discussed at previous   -GBS negative   Cervix /-3, posterior, soft     Delivery planning  -IOL at 39-40 wk advised due to obesity, increased risk stillbirth.   -patient thinks she would be ok with CHRIS. Message to  (patient trying to avoid .)  -IOL scheduled  cytotec    Obesity (pre preg BMI 39)   -limit wt gain 11-20 lb   -baseline A1c & CMP ok   -Failed early 1 hr GTT - passed 3 hr GTT but had low value at 3 hr GTT. Didn't get much sleep that night.   -aspirin - taking  -L2 US 5/10 left ventriculomegaly noted  -Follow up US  at 26w4d - ventriculomegaly RESOLVED  -Follow-up growth ultrasound every 4 weeks with the addition of a BPP with each growth assessment 32 weeks and beyond - ordered     -32 wk - NL, EFW 58%ile, normal fluid, vertex     - cephalic, EFW 60%, KAYLYNN 17.9 cm, BPP     -Weekly NSTs at 36 wk     Hx Prediabetes   -10/4/23  A1c 6.3% pre pregnancy -> prescribed metformin 500 mg daily   -conceived while on Metformin 500 mg ER daily - stopped after 1st OB visit  -early 1 hr GTT high but passed early 3 hr GTT  -normal baseline A1c   -24 A1c 5.5%    IUI & Clomid   -ovulatory dysfunction  -partner with 47, XYY karyotype (Sawyer Syndrome) - was still able to use his sperm    Asthma & allergies, eczema    -multiple allergies of unknown etiology which manifest as tongue swelling, lip swelling and face swelling. Also gets heart palpitations and GI distress associated with multiple foods   -per pt asthma well controlled, does not have a PCP since she switched to her 's insurance - advise her to follow up  -stopped Advair with positive pregnancy test -> Rx Fluticasone inhaler   -Singular daily, Zyrtec, albuterol PRN  -no recent albuterol use     Subclinical hypothyroidism  -10/4/23 TSH 3.7 (pre-conception) -> Rx levothyroxine 25 mcg   -stopped levothyroxine per [facility] & had additional elevated levels (12/13/23 TSH 4.56 & 1/16/24 5.95)   -[facility]MER restarted levothyroxine 50 mcg daily (increased from 25 mcg dose)   -3/20 TSH 1.21   -4/23 TSH 1.2 - 2nd trim  -7/24/24 1.19 - 3rd trim    GERD  -omeprazole 20 mg daily     Interstitial cystitis, dyspareunia, pelvic floor dysfunction  -Urologist Dr. 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 Has a fog & some \"purple marks\" in her vision as well. Negative image for up to 5 minutes at its worst. Can get black spots/moving spots. -Neuro-ophthalmologist says her \"filter\" doesn't work in the brain. Was told she could try a seizure medication & may help 30-40% of the time.     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 Extra oral iron advised      RTC 1 wk with NST

## 2024-09-09 ENCOUNTER — TELEPHONE (OUTPATIENT)
Dept: OBGYN UNIT | Facility: HOSPITAL | Age: 34
End: 2024-09-09

## 2024-09-10 ENCOUNTER — TELEPHONE (OUTPATIENT)
Facility: CLINIC | Age: 34
End: 2024-09-10

## 2024-09-10 NOTE — TELEPHONE ENCOUNTER
Patient calling to discuss new symptom of brown discharge/bleeding.  Patient states she thinks this is normal for GA 38wk.   Please advise.

## 2024-09-10 NOTE — TELEPHONE ENCOUNTER
Reason Brown Discharge   Last seen  2024   History  38     Onset 9/10/2024 at 10:30 AM   Assessment Patient states she has had continuous brown discharge since 10:30 AM today. Brown discharge is mostly when she wipes. Very little in pad. Patient states she does have vaginal pressure since the baby dropped. Patient denies contractions, constipation, decrease fetal movement, N/V, heavy lifting, intercourse, and watery discharge.    Plan  Per Dr. Diaz, waiting for Friday appointment is ok. However, if she has any changes in symptoms such as contractions, decrease fetal movement, rupture of membranes, bleeding that is red, heavy bleeding, abdominal pain, patient should be call us or go to L&D. If any concerns, go to L&D.   Follow up Appointment  2024 - Dr. Jaramillo   Patient verbalized understanding, agreed to and intend to comply with plan of care.

## 2024-09-13 ENCOUNTER — TELEPHONE (OUTPATIENT)
Dept: OBGYN UNIT | Facility: HOSPITAL | Age: 34
End: 2024-09-13

## 2024-09-13 ENCOUNTER — HOSPITAL ENCOUNTER (INPATIENT)
Facility: HOSPITAL | Age: 34
LOS: 4 days | Discharge: HOME | End: 2024-09-17
Attending: OBSTETRICS & GYNECOLOGY | Admitting: OBSTETRICS & GYNECOLOGY
Payer: COMMERCIAL

## 2024-09-13 ENCOUNTER — ANESTHESIA (OUTPATIENT)
Dept: OBGYN UNIT | Facility: HOSPITAL | Age: 34
End: 2024-09-13
Payer: COMMERCIAL

## 2024-09-13 ENCOUNTER — ANESTHESIA EVENT (OUTPATIENT)
Dept: OBGYN UNIT | Facility: HOSPITAL | Age: 34
End: 2024-09-13
Payer: COMMERCIAL

## 2024-09-13 ENCOUNTER — ROUTINE PRENATAL (OUTPATIENT)
Facility: CLINIC | Age: 34
End: 2024-09-13
Payer: COMMERCIAL

## 2024-09-13 VITALS
DIASTOLIC BLOOD PRESSURE: 66 MMHG | BODY MASS INDEX: 42.8 KG/M2 | WEIGHT: 289 LBS | HEIGHT: 69 IN | HEART RATE: 84 BPM | SYSTOLIC BLOOD PRESSURE: 136 MMHG

## 2024-09-13 DIAGNOSIS — Z3A.38 38 WEEKS GESTATION OF PREGNANCY (HCC): Primary | ICD-10-CM

## 2024-09-13 PROBLEM — O28.8 NON-STRESS TEST WITH DECELERATIONS: Status: ACTIVE | Noted: 2024-09-13

## 2024-09-13 PROBLEM — Z91.89 AT RISK FOR ALTERED URINARY ELIMINATION: Status: ACTIVE | Noted: 2024-09-13

## 2024-09-13 PROBLEM — Z98.890 STATUS POST INDUCTION OF LABOR: Status: ACTIVE | Noted: 2024-09-13

## 2024-09-13 PROBLEM — O16.3 HYPERTENSION AFFECTING PREGNANCY IN THIRD TRIMESTER (HCC): Status: ACTIVE | Noted: 2024-09-13

## 2024-09-13 PROBLEM — Z34.90 PREGNANCY (HCC): Status: ACTIVE | Noted: 2024-09-13

## 2024-09-13 LAB
ALBUMIN SERPL-MCNC: 3.6 G/DL (ref 3.2–4.8)
ALBUMIN/GLOB SERPL: 1.2 {RATIO} (ref 1–2)
ALP LIVER SERPL-CCNC: 163 U/L
ALT SERPL-CCNC: 9 U/L
ANION GAP SERPL CALC-SCNC: 10 MMOL/L (ref 0–18)
ANTIBODY SCREEN: NEGATIVE
AST SERPL-CCNC: 20 U/L (ref ?–34)
BASOPHILS # BLD AUTO: 0.05 X10(3) UL (ref 0–0.2)
BASOPHILS NFR BLD AUTO: 0.3 %
BILIRUB SERPL-MCNC: 0.3 MG/DL (ref 0.3–1.2)
BUN BLD-MCNC: 10 MG/DL (ref 9–23)
CALCIUM BLD-MCNC: 9.4 MG/DL (ref 8.7–10.4)
CHLORIDE SERPL-SCNC: 108 MMOL/L (ref 98–112)
CO2 SERPL-SCNC: 21 MMOL/L (ref 21–32)
CREAT BLD-MCNC: 0.83 MG/DL
EGFRCR SERPLBLD CKD-EPI 2021: 95 ML/MIN/1.73M2 (ref 60–?)
EOSINOPHIL # BLD AUTO: 0.54 X10(3) UL (ref 0–0.7)
EOSINOPHIL NFR BLD AUTO: 3.5 %
ERYTHROCYTE [DISTWIDTH] IN BLOOD BY AUTOMATED COUNT: 15.7 %
GLOBULIN PLAS-MCNC: 2.9 G/DL (ref 2–3.5)
GLUCOSE BLD-MCNC: 71 MG/DL (ref 70–99)
HCT VFR BLD AUTO: 35.7 %
HGB BLD-MCNC: 12.2 G/DL
IMM GRANULOCYTES # BLD AUTO: 0.2 X10(3) UL (ref 0–1)
IMM GRANULOCYTES NFR BLD: 1.3 %
LYMPHOCYTES # BLD AUTO: 2.62 X10(3) UL (ref 1–4)
LYMPHOCYTES NFR BLD AUTO: 17.2 %
MCH RBC QN AUTO: 26.9 PG (ref 26–34)
MCHC RBC AUTO-ENTMCNC: 34.2 G/DL (ref 31–37)
MCV RBC AUTO: 78.8 FL
MONOCYTES # BLD AUTO: 1.04 X10(3) UL (ref 0.1–1)
MONOCYTES NFR BLD AUTO: 6.8 %
NEUTROPHILS # BLD AUTO: 10.8 X10 (3) UL (ref 1.5–7.7)
NEUTROPHILS # BLD AUTO: 10.8 X10(3) UL (ref 1.5–7.7)
NEUTROPHILS NFR BLD AUTO: 70.9 %
OSMOLALITY SERPL CALC.SUM OF ELEC: 286 MOSM/KG (ref 275–295)
PLATELET # BLD AUTO: 262 10(3)UL (ref 150–450)
POTASSIUM SERPL-SCNC: 4.1 MMOL/L (ref 3.5–5.1)
PROT SERPL-MCNC: 6.5 G/DL (ref 5.7–8.2)
RBC # BLD AUTO: 4.53 X10(6)UL
RH BLOOD TYPE: POSITIVE
RH BLOOD TYPE: POSITIVE
SODIUM SERPL-SCNC: 139 MMOL/L (ref 136–145)
T PALLIDUM AB SER QL IA: NONREACTIVE
URATE SERPL-MCNC: 4.9 MG/DL
WBC # BLD AUTO: 15.3 X10(3) UL (ref 4–11)

## 2024-09-13 PROCEDURE — 59025 FETAL NON-STRESS TEST: CPT | Performed by: STUDENT IN AN ORGANIZED HEALTH CARE EDUCATION/TRAINING PROGRAM

## 2024-09-13 PROCEDURE — 3075F SYST BP GE 130 - 139MM HG: CPT | Performed by: STUDENT IN AN ORGANIZED HEALTH CARE EDUCATION/TRAINING PROGRAM

## 2024-09-13 PROCEDURE — 3008F BODY MASS INDEX DOCD: CPT | Performed by: STUDENT IN AN ORGANIZED HEALTH CARE EDUCATION/TRAINING PROGRAM

## 2024-09-13 PROCEDURE — 3078F DIAST BP <80 MM HG: CPT | Performed by: STUDENT IN AN ORGANIZED HEALTH CARE EDUCATION/TRAINING PROGRAM

## 2024-09-13 RX ORDER — IBUPROFEN 600 MG/1
600 TABLET, FILM COATED ORAL ONCE AS NEEDED
Status: DISCONTINUED | OUTPATIENT
Start: 2024-09-13 | End: 2024-09-14

## 2024-09-13 RX ORDER — DEXTROSE, SODIUM CHLORIDE, SODIUM LACTATE, POTASSIUM CHLORIDE, AND CALCIUM CHLORIDE 5; .6; .31; .03; .02 G/100ML; G/100ML; G/100ML; G/100ML; G/100ML
INJECTION, SOLUTION INTRAVENOUS AS NEEDED
Status: DISCONTINUED | OUTPATIENT
Start: 2024-09-13 | End: 2024-09-14

## 2024-09-13 RX ORDER — ONDANSETRON 2 MG/ML
4 INJECTION INTRAMUSCULAR; INTRAVENOUS EVERY 6 HOURS PRN
Status: DISCONTINUED | OUTPATIENT
Start: 2024-09-13 | End: 2024-09-14

## 2024-09-13 RX ORDER — SODIUM CHLORIDE, SODIUM LACTATE, POTASSIUM CHLORIDE, CALCIUM CHLORIDE 600; 310; 30; 20 MG/100ML; MG/100ML; MG/100ML; MG/100ML
INJECTION, SOLUTION INTRAVENOUS CONTINUOUS
Status: DISCONTINUED | OUTPATIENT
Start: 2024-09-13 | End: 2024-09-14

## 2024-09-13 RX ORDER — TERBUTALINE SULFATE 1 MG/ML
0.25 INJECTION, SOLUTION SUBCUTANEOUS AS NEEDED
Status: DISCONTINUED | OUTPATIENT
Start: 2024-09-13 | End: 2024-09-14

## 2024-09-13 RX ORDER — CITRIC ACID/SODIUM CITRATE 334-500MG
30 SOLUTION, ORAL ORAL AS NEEDED
Status: DISCONTINUED | OUTPATIENT
Start: 2024-09-13 | End: 2024-09-14

## 2024-09-13 RX ORDER — HYDROMORPHONE HYDROCHLORIDE 1 MG/ML
1 INJECTION, SOLUTION INTRAMUSCULAR; INTRAVENOUS; SUBCUTANEOUS EVERY 2 HOUR PRN
Status: DISCONTINUED | OUTPATIENT
Start: 2024-09-13 | End: 2024-09-14

## 2024-09-13 RX ORDER — ACETAMINOPHEN 500 MG
1000 TABLET ORAL EVERY 6 HOURS PRN
Status: DISCONTINUED | OUTPATIENT
Start: 2024-09-13 | End: 2024-09-14

## 2024-09-13 RX ORDER — NALBUPHINE HYDROCHLORIDE 10 MG/ML
2.5 INJECTION, SOLUTION INTRAMUSCULAR; INTRAVENOUS; SUBCUTANEOUS
Status: DISCONTINUED | OUTPATIENT
Start: 2024-09-13 | End: 2024-09-14

## 2024-09-13 RX ORDER — BUPIVACAINE HCL/0.9 % NACL/PF 0.25 %
5 PLASTIC BAG, INJECTION (ML) EPIDURAL AS NEEDED
Status: DISCONTINUED | OUTPATIENT
Start: 2024-09-13 | End: 2024-09-14

## 2024-09-13 RX ORDER — METOCLOPRAMIDE HYDROCHLORIDE 5 MG/ML
10 INJECTION INTRAMUSCULAR; INTRAVENOUS EVERY 6 HOURS PRN
Status: DISCONTINUED | OUTPATIENT
Start: 2024-09-13 | End: 2024-09-14

## 2024-09-13 RX ORDER — ACETAMINOPHEN 500 MG
500 TABLET ORAL EVERY 6 HOURS PRN
Status: DISCONTINUED | OUTPATIENT
Start: 2024-09-13 | End: 2024-09-14

## 2024-09-13 RX ORDER — LIDOCAINE/PRILOCAINE 2.5 %-2.5%
CREAM (GRAM) TOPICAL AS NEEDED
COMMUNITY
Start: 2024-06-26

## 2024-09-13 NOTE — PROGRESS NOTES
MARCELLE - visit #12    Chief Complaint   Patient presents with    Prenatal Care     MARCELLE 38w 4d  - 09/10 brown spotting from 11am -7pm  -  brown spotting  11am- 8:30pm        Breast Pain   Chaperone declines     + FM. + Laron pacheco contractions, denies LOF. Did have increased brown discharge, but no bright red blood noted. Overall more mucous discharge as well.     Vitals:    24 1109   BP: 136/66   Pulse: 84   Weight: 289 lb (131.1 kg)   Height: 69\"       34 year old     CHRIS 24 by LMP c/w 7w1d US (IUI pregnancy - Dr. Quang Walker)   O+    FOB Sean - 7'0\" - requests bariatric chair for spouse due to tall stature for patient's labor room  -NIPS negative, BOY  -Carrier done with Dr. Walker - reviewed records - RSM panel negative results   -1 hr GTT, CBC, 3rd trim HIV & syphilis done.   -Tdap 24  -classes, pre-registration, pediatrician, breast pump, car seat discussed at previous   -GBS negative   Cervix /-3, posterior, soft     Delivery planning  -IOL at 39-40 wk advised due to obesity, increased risk stillbirth.   -patient thinks she would be ok with CHRIS. Message to  (patient trying to avoid .)  -IOL scheduled  cytotec    Obesity (pre preg BMI 39)   -limit wt gain 11-20 lb   -baseline A1c & CMP ok   -Failed early 1 hr GTT - passed 3 hr GTT but had low value at 3 hr GTT. Didn't get much sleep that night.   -aspirin - taking  -L2 US 5/10 left ventriculomegaly noted  -Follow up US  at 26w4d - ventriculomegaly RESOLVED  -Follow-up growth ultrasound every 4 weeks with the addition of a BPP with each growth assessment 32 weeks and beyond - ordered     -32 wk - NL, EFW 58%ile, normal fluid, vertex     - cephalic, EFW 60%, KAYLYNN 17.9 cm, BPP     -Weekly NSTs at 36 wk: NST non-reassuring today due to prolonged deceleration x 3 minutes. Given this and current GA, likely plan for IOL.     Hx Prediabetes   -10/4/23  A1c 6.3% pre pregnancy -> prescribed metformin 500 mg  daily   -conceived while on Metformin 500 mg ER daily - stopped after 1st OB visit  -early 1 hr GTT high but passed early 3 hr GTT  -normal baseline A1c   -7/24/24 A1c 5.5%    IUI & Clomid   -ovulatory dysfunction  -partner with 47, XYY karyotype (Sawyer Syndrome) - was still able to use his sperm    Asthma & allergies, eczema   -multiple allergies of unknown etiology which manifest as tongue swelling, lip swelling and face swelling. Also gets heart palpitations and GI distress associated with multiple foods   -per pt asthma well controlled, does not have a PCP since she switched to her 's insurance - advise her to follow up  -stopped Advair with positive pregnancy test -> Rx Fluticasone inhaler   -Singular daily, Zyrtec, albuterol PRN  -no recent albuterol use     Subclinical hypothyroidism  -10/4/23 TSH 3.7 (pre-conception) -> Rx levothyroxine 25 mcg   -stopped levothyroxine per SUMMER & had additional elevated levels (12/13/23 TSH 4.56 & 1/16/24 5.95)   -SUMMER restarted levothyroxine 50 mcg daily (increased from 25 mcg dose)   -3/20 TSH 1.21   -4/23 TSH 1.2 - 2nd trim  -7/24/24 1.19 - 3rd trim    GERD  -omeprazole 20 mg daily     Interstitial cystitis, dyspareunia, pelvic floor dysfunction  -Urologist Dr. 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 Has a fog & some \"purple marks\" in her vision as well. Negative image for up to 5 minutes at its worst. Can get black spots/moving spots. -Neuro-ophthalmologist says her \"filter\" doesn't work in the brain. Was told she could try a seizure medication & may help 30-40% of  the time.     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 Extra oral iron advised        During NST today, prolonged deceleration noted for 3 minutes. Remainder of tracing with moderate variability and accelerations. Given NST and current GA, plan for delivery/IOL. Sent to L&D for further management.

## 2024-09-13 NOTE — PLAN OF CARE
Problem: BIRTH - VAGINAL/ SECTION  Goal: Fetal and maternal status remain reassuring during the birth process  Description: INTERVENTIONS:  - Monitor vital signs  - Monitor fetal heart rate  - Monitor uterine activity  - Monitor labor progression (vaginal delivery)  - DVT prophylaxis (C/S delivery)  - Surgical antibiotic prophylaxis (C/S delivery)  Outcome: Progressing     Problem: PAIN - ADULT  Goal: Verbalizes/displays adequate comfort level or patient's stated pain goal  Description: INTERVENTIONS:  - Encourage pt to monitor pain and request assistance  - Assess pain using appropriate pain scale  - Administer analgesics based on type and severity of pain and evaluate response  - Implement non-pharmacological measures as appropriate and evaluate response  - Consider cultural and social influences on pain and pain management  - Manage/alleviate anxiety  - Utilize distraction and/or relaxation techniques  - Monitor for opioid side effects  - Notify MD/LIP if interventions unsuccessful or patient reports new pain  - Anticipate increased pain with activity and pre-medicate as appropriate  Outcome: Progressing     Problem: ANXIETY  Goal: Will report anxiety at manageable levels  Description: INTERVENTIONS:  - Administer medication as ordered  - Teach and rehearse alternative coping skills  - Provide emotional support with 1:1 interaction with staff  Outcome: Progressing     Problem: Patient/Family Goals  Goal: Patient/Family Long Term Goal  Description: Patient's Long Term Goal: Safe and uncomplicated delivery    Interventions:  - See additional Care Plan goals for specific interventions  Outcome: Progressing  Goal: Patient/Family Short Term Goal  Description: Patient's Short Term Goal: Adequate pain control    Interventions:  - See additional Care Plan goals for specific interventions  Outcome: Progressing

## 2024-09-13 NOTE — PROGRESS NOTES
Pt is a 34 year old female admitted to 110/110-A.     Chief Complaint   Patient presents with    Scheduled Induction     Prolonged deceleration in the office      Pt is  38w4d intra-uterine pregnancy.  History obtained, consents signed. Oriented to room, staff, and plan of care.

## 2024-09-13 NOTE — H&P
[facility]   part of [facility]    History & Physical    Mary Huerta Patient Status:  Inpatient    2/15/1990 MRN [id number]   Location [facility] LABOR & DELIVERY Attending Danita Diaz MD   Hosp Day # 0 PCP DANA BLANK     Date of Admission:  2024 12:18 PM       HPI:   Mary Huerta is a 34 year old  admitted at 38w4d on 24 from office for induction of labor due to prolonged deceleration on NST being done for routine fetal surveillance due to obesity (pre preg BMI 39).     Pregnancy significant for obesity, IUI/Clomid conception, partner with 47, XYY karyotype (Sawyer Syndrome), h/o prediabetes, subclinical hypothyroidism, GERD on PPI, asthma & allergies, eczema, Interstitial cystitis, dyspareunia, pelvic floor dysfunction. 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 Has a fog & some \"purple marks\" in her vision as well. Negative image for up to 5 minutes at its worst. Can get black spots/moving spots. -Neuro-ophthalmologist says her \"filter\" doesn't work in the brain. Was told she could try a seizure medication & may help 30-40% of the time.      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 No active vaginal bleeding. +FM. Laron pacheco  contractions. No leaking amniotic fluid.     History   Obstetric History:   OB History    Para Term  AB Living   3       2     SAB IAB Ectopic Multiple Live Births   2              # Outcome Date GA Lbr Mik/2nd Weight Sex Type Anes PTL Lv   3 Current            2 SAB 2023 4w0d    SAB   FD      Birth Comments: Chemical pregnancy right after coming off NuvaRing   1 SAB               Obstetric Comments   2023 - Chemical SAB. Dating uncertain. Conceived right after stopping NuvaRing. Had a lot of bleeding though. Very faint pregnancy test.       Current - Clomid & IUI per SUMMER Walker (ovulatory dysfunction). Partner with 47, XYY karyotype (Sawyer Syndrome) - was still able to use his sperm. Obesity, h/o prediabetes, subclinical hypothyroidism, GERD on PPI, asthma & allergies, eczema, Interstitial cystitis, dyspareunia, pelvic floor dysfunction. 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 IOL for prolonged deceleration on routine NST in office.      Past Medical History:   Past Medical History:   Diagnosis Date    Abdominal pain 2016    Acute severe asthma (HCC) 2017    Asthma (HCC) 2023    Asthmatic bronchitis (HCC) 2022    BMI 38.0-38.9,adult     Chronic interstitial cystitis 2022    Symptoms since 8 years old but not formally diagnosed until adulthood.    Colon polyp     Complicated migraine     with visual aura, verbal & balance/coordination issues.    Dizziness 2023    Dysphagia     E-coli UTI 2023    Eczema 2021    Ganglion of flexor tendon sheath of right index finger 2023    GERD (gastroesophageal reflux disease)     Gross hematuria 2023    urine culture >100k E.coli    History of pelvic ultrasound 10/16/2023    Pelvic US  unremarkable    Infertility management 2023    Clomid & IUI to conceive 2024 pregnancy    Lightheadedness 09/26/2023    Migraine with aura     Morbid obesity with BMI of 40.0-44.9, adult (HCC) 09/26/2023    Numbness and tingling 01/20/2023    Rheum visit 1/20/23 - clinical picture, imaging, lab data not consistent with known rheum disorders. lower extremity numbness and tingling, you could consider nerve conduction studies or special testing for small fiber neuropathy (though admittedly, this would not explain vertigo, visual snow or migraine symptoms).    Pap smear for cervical cancer screening 02/13/2023    Pap & HPV negative 2/13/23 - Peggy Melgar. Per CareEverywhere    Prediabetes 10/04/2023    10/4/23 A1c 6.4%    Sciatica     Vertigo     mal de debarquement syndrome. MDDS - frontal lobe communicates incorrectly to vestibular system. Feels likes she is moving when she is not. Went to PT. Helped. Valium helps to stop the symptoms.    Visual disturbance 11/01/2022    Visual snow syndrome     reports this developed while on Nexplanon. fuzzy dots in front of her 20/20 vision all the time. Has a fog & some \"purple marks\" in her vision as well. Negative image for up to 5 minutes at its worst. Can get black spots/moving spots.      Past Social History:   Past Surgical History:   Procedure Laterality Date    Appendectomy  10/11/2014    Colonoscopy & polypectomy  2019    Does have polyps. Was told every 3 years    Cystourethroscopy  10/18/2023    Cystoscopy Dr. Ko Flroes for gross hematuria. Unremarkable.    Egd  2019    esophagus inflamed. Was having dysphagia & GERD.    Hysterosalpingogram - external referral  11/09/2023    normal cavity. By IVF doctor Quang Wlaker    Insert contraceptive capsul  2012    Nexplanon    Knee surgery Left 01/2019    Laparoscopic appendectomy  10/11/2014    not ruptured    Laparoscopic cholecystectomy  2012    +Stones & sludge    Other surgical history Right 08/09/2023    right  riley finger cyst removal    Removal of contraceptive capsul  2014    H/o \"severe allergic reaction\" to birth control (Nexplanon) which consisted of severe migraines, visual issues. Reports this episode occurred after one year with nexplanon. Initially improved after removal of nexplanon implant, but then reoccurred with subsequent menstrual cycle. States these symptoms significantly improved with nuva ring and cessation of menstrual cycles    Removal of ovarian cyst(s) Right 10/11/2014    done at same time of appendectomy. Maybe just functional cyst    Sinus surgery    2019    deviated septum    Spinal puncture,lumbar,diagnostic  2014    She had an LP in 2014 which, per report, showed the following:-- CSF May 2014. Glc 51, protein 34, WBC 0, RBC 6, VDRL negative, OCB negative, opening pressure 14.5 cm in lateral position.    Tonsillectomy  1995     Family History:   Family History   Problem Relation Age of Onset    Colon Polyps Father     Infertility Mother         Took Clomid. Short luteal phase    Breast Cancer Maternal Grandmother 73    Prostate Cancer Maternal Grandfather         dx around 50 or maybe younger    Thyroid disease Paternal Grandmother     Other (RA) Paternal Grandmother     No Known Problems Paternal Grandfather     Infertility Sister         Unexplained infertility. IVF for 1st & then conceived spontaneously    Other (genetic carrier) Sister         3 different mutations: CF, hereditary fructose intolerance, ARSACS carrier    Ovarian Cancer Neg     Colon Cancer Neg     Diabetes Neg     Birth Defects Neg     Genetic Disease Neg     Endometriosis Neg     Autoimmune disease Neg     Uterine Cancer Neg     Pancreatic Cancer Neg      Social History:   Social History     Tobacco Use    Smoking status: Never     Passive exposure: Never    Smokeless tobacco: Never   Substance Use Topics    Alcohol use: Yes     Comment: rarely        Allergies/Medications:   Allergies:   Allergies   Allergen Reactions     Cephalexin SWELLING    Mold WHEEZING    Molds & Smuts OTHER (SEE COMMENTS) and WHEEZING    Nexplanon [Etonogestrel] OTHER (SEE COMMENTS)     Ovarian cysts. Reports had severe migraine & visual issues.    Hydrocodone NAUSEA ONLY and DIZZINESS    Kdc:Acetaminophen+Sorbitan+Propoxyphene DIZZINESS and NAUSEA AND VOMITING    [place] UNKNOWN    Pollen OTHER (SEE COMMENTS)    Adhesive Tape RASH     Allergic to paper tape only    Topiramate RASH     Red blotches on legs     Medications:  Medications Prior to Admission   Medication Sig Dispense Refill Last Dose    ferrous sulfate 325 (65 FE) MG Oral Tab EC Take 1 tablet (325 mg total) by mouth every other day.   Past Week at 1100    levothyroxine 50 MCG Oral Tab Take 1 tablet (50 mcg total) by mouth daily. Overdue for labs including recheck of TSH. 90 tablet 0 2024 at 0630    cetirizine 10 MG Oral Tab Take 1 tablet (10 mg total) by mouth daily.   2024 at 2100    Methylcobalamin (B12-ACTIVE OR) Take by mouth.   2024 at 2100    omeprazole 20 MG Oral Capsule Delayed Release Take 1 capsule (20 mg total) by mouth daily. 90 capsule 3 2024 at 2100    montelukast 10 MG Oral Tab Take 1 tablet (10 mg total) by mouth nightly.   Past Month at 2100    prenatal vitamin with DHA 27-0.8-228 MG Oral Cap Take 1 capsule by mouth daily.   2024 at 2100    Cholecalciferol (VITAMIN D-3) 25 MCG (1000 UT) Oral Cap Take 6,000 Int'l Units by mouth daily.   2024 at 2100    lidocaine-prilocaine 2.5-2.5 % External Cream Apply topically as needed.   More than a month    [] amoxicillin clavulanate 875-125 MG Oral Tab Take 1 tablet by mouth 2 (two) times daily for 10 days. 20 tablet 0     albuterol (5 MG/ML) 0.5% Inhalation Nebu Soln Take 0.5 mL (2.5 mg total) by nebulization every 6 (six) hours as needed.   More than a month    albuterol (2.5 MG/3ML) 0.083% Inhalation Nebu Soln Take 3 mL (2.5 mg total) by nebulization every 6 (six) hours as needed.   More than a month        Review of Systems:   As documented in HPI    Physical Exam:   Temp:  [98 °F (36.7 °C)-98.1 °F (36.7 °C)] 98 °F (36.7 °C)  Pulse:  [55-84] 55  Resp:  [18] 18  BP: (136-151)/(66-91) 147/91    Vitals:    09/13/24 1309 09/13/24 1317 09/13/24 1340 09/13/24 1820   BP:   151/84 (!) 147/91   BP Location:   Right arm Left arm   Pulse:   60 55   Resp:   18 18   Temp:   98.1 °F (36.7 °C) 98 °F (36.7 °C)   TempSrc:   Oral Oral   Weight: 289 lb (131.1 kg) 289 lb (131.1 kg)     Height:  69\"         Estimated body mass index is 42.68 kg/m² as calculated from the following:    Height as of this encounter: 69\".    Weight as of this encounter: 289 lb (131.1 kg).     FHT: 140 bpm, mod mateo, some apparent variable decelerations  [place] rare contractions tracing  SVE 2/70/-3 per RN exam 9/13/2024 at 1336 & 1822     Results:       Component      Latest Ref Rng 9/13/2024  1:37 PM 9/13/2024  1:38 PM   WBC      4.0 - 11.0 x10(3) uL 15.3 (H)     RBC      3.80 - 5.30 x10(6)uL 4.53     Hemoglobin      12.0 - 16.0 g/dL 12.2     Hematocrit      35.0 - 48.0 % 35.7     Platelet Count      150.0 - 450.0 10(3)uL 262.0     MCV      80.0 - 100.0 fL 78.8 (L)     MCH      26.0 - 34.0 pg 26.9     MCHC      31.0 - 37.0 g/dL 34.2     RDW      % 15.7     Prelim Neutrophil Abs      1.50 - 7.70 x10 (3) uL 10.80 (H)     Neutrophils Absolute      1.50 - 7.70 x10(3) uL 10.80 (H)     Lymphocytes Absolute      1.00 - 4.00 x10(3) uL 2.62     Monocytes Absolute      0.10 - 1.00 x10(3) uL 1.04 (H)     Eosinophils Absolute      0.00 - 0.70 x10(3) uL 0.54     Basophils Absolute      0.00 - 0.20 x10(3) uL 0.05     Immature Granulocyte Absolute      0.00 - 1.00 x10(3) uL 0.20     Neutrophils %      % 70.9     Lymphocytes %      % 17.2     Monocytes %      % 6.8     Eosinophils %      % 3.5     Basophils %      % 0.3     Immature Granulocyte %      % 1.3     ABO BLOOD TYPE  O    RH Factor  Positive    TREPONEMA PALLIDUM AB, PARTICLE AGGLUTINATION      Nonreactive    Nonreactive     ANTIBODY SCREEN  Negative      Assessment/Plan:    Mary Huerta 34 year old  at 38w4d - 38w4d on 24 from office for induction of labor due to prolonged deceleration on NST being done for routine fetal surveillance due to obesity (pre preg BMI 39). Noted to be hypertensive on admission (140s-150s/80s-90s)    Pregnancy significant for obesity, IUI/Clomid conception, partner with 47, XYY karyotype (Sawyer Syndrome), h/o prediabetes, subclinical hypothyroidism, GERD on PPI, asthma & allergies, eczema, Interstitial cystitis, dyspareunia, pelvic floor dysfunction. 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  -will ask for measured voids & bladder scan PVRs after first 3 voids following delivery to watch for acute urinary retention    Disposition - inpatient    Danita Diaz MD    Note to patient and family:  The  [employer] Cures Act makes medical notes available to patients in the interest of transparency.  However, please be advised that this is a medical document.  It is intended as a peer to peer communication.  It is written in medical language and may contain abbreviations or verbiage that are technical and unfamiliar.  It may appear blunt or direct.  Medical documents are  intended to carry relevant information, facts as evident, and the clinical opinion of the practitioner.

## 2024-09-13 NOTE — PROGRESS NOTES
[facility]  Obstetrics and Gynecology    OB/GYN: Intrapartum Progress Note     SUBJECTIVE:  Patient is a 34 year old  female at 38w4d admitted for IOL. Patient reports doing well. Pain well controlled.     Admission H&P submitted by Dr. Diaz. MFM consultation was obtained on admission and recommendation made for delivery due to prolong deceleration noted on NST in office at 38w4d.    OBJECTIVE:  Vitals:    24 1309 24 1317 24 1340 24 1820   BP:   151/84 (!) 147/91   Pulse:   60 55   Resp:   18 18   Temp:   98.1 °F (36.7 °C) 98 °F (36.7 °C)   TempSrc:   Oral Oral   Weight: 289 lb (131.1 kg) 289 lb (131.1 kg)     Height:  69\"         Physical Exam:  General: AAO. NAD.   Lungs: no tachypnea   Fundus + gravid.  Ext: no bilateral calf tenderness. +1 non-pitting edema bilateral.     FHT: moderate variability / 140 BPM / + accel / +variable decel   [place]: irregular   SVE: 2/70/-2 per RN    ASSESSMENT/PLAN:  Patient is a 34 year old  female at 38w4d admitted for IOL.     Doing well   Continue routine intrapartum care  Continue IOL. S/p cytotec x 1 dose. Continue pitocin per protocol.    GBS negative   IV pain medication PRN or epidural per request   Elevated BPs after admission. Labs ordered. Continue to monitor for signs/symptoms of pre eclampsia without severe features. Continue BP monitoring.     Deann Kidd MD   [facility] - OBGYNw

## 2024-09-14 PROBLEM — O36.8390 FETAL HEART RATE NON-REASSURING AFFECTING MANAGEMENT OF MOTHER (HCC): Status: ACTIVE | Noted: 2024-09-14

## 2024-09-14 PROBLEM — Z98.891 S/P CESAREAN SECTION: Status: ACTIVE | Noted: 2024-09-14

## 2024-09-14 PROBLEM — Z98.891 STATUS POST PRIMARY LOW TRANSVERSE CESAREAN SECTION: Status: ACTIVE | Noted: 2024-09-14

## 2024-09-14 PROBLEM — O13.3 GESTATIONAL HYPERTENSION, THIRD TRIMESTER (HCC): Status: ACTIVE | Noted: 2024-09-13

## 2024-09-14 LAB
CREAT UR-SCNC: 64.5 MG/DL
PROT UR-MCNC: 17 MG/DL (ref ?–14)
PROT/CREAT UR-RTO: 0.26

## 2024-09-14 PROCEDURE — 3E033VJ INTRODUCTION OF OTHER HORMONE INTO PERIPHERAL VEIN, PERCUTANEOUS APPROACH: ICD-10-PCS | Performed by: OBSTETRICS & GYNECOLOGY

## 2024-09-14 PROCEDURE — 3E0P7VZ INTRODUCTION OF HORMONE INTO FEMALE REPRODUCTIVE, VIA NATURAL OR ARTIFICIAL OPENING: ICD-10-PCS | Performed by: OBSTETRICS & GYNECOLOGY

## 2024-09-14 PROCEDURE — 59510 CESAREAN DELIVERY: CPT | Performed by: OBSTETRICS & GYNECOLOGY

## 2024-09-14 RX ORDER — HYDROMORPHONE HYDROCHLORIDE 1 MG/ML
0.3 INJECTION, SOLUTION INTRAMUSCULAR; INTRAVENOUS; SUBCUTANEOUS EVERY 2 HOUR PRN
Status: DISCONTINUED | OUTPATIENT
Start: 2024-09-14 | End: 2024-09-17

## 2024-09-14 RX ORDER — DIPHENHYDRAMINE HYDROCHLORIDE 50 MG/ML
25 INJECTION INTRAMUSCULAR; INTRAVENOUS ONCE AS NEEDED
Status: DISCONTINUED | OUTPATIENT
Start: 2024-09-14 | End: 2024-09-14 | Stop reason: HOSPADM

## 2024-09-14 RX ORDER — BISACODYL 10 MG
10 SUPPOSITORY, RECTAL RECTAL ONCE AS NEEDED
Status: DISCONTINUED | OUTPATIENT
Start: 2024-09-14 | End: 2024-09-17

## 2024-09-14 RX ORDER — OXYCODONE HYDROCHLORIDE 5 MG/1
5 TABLET ORAL EVERY 6 HOURS PRN
Status: DISCONTINUED | OUTPATIENT
Start: 2024-09-14 | End: 2024-09-17

## 2024-09-14 RX ORDER — NALBUPHINE HYDROCHLORIDE 10 MG/ML
4 INJECTION, SOLUTION INTRAMUSCULAR; INTRAVENOUS; SUBCUTANEOUS EVERY 4 HOURS PRN
Status: DISCONTINUED | OUTPATIENT
Start: 2024-09-14 | End: 2024-09-17

## 2024-09-14 RX ORDER — SODIUM CHLORIDE, SODIUM LACTATE, POTASSIUM CHLORIDE, CALCIUM CHLORIDE 600; 310; 30; 20 MG/100ML; MG/100ML; MG/100ML; MG/100ML
INJECTION, SOLUTION INTRAVENOUS CONTINUOUS
Status: DISCONTINUED | OUTPATIENT
Start: 2024-09-14 | End: 2024-09-17

## 2024-09-14 RX ORDER — LEVOTHYROXINE SODIUM 50 UG/1
50 TABLET ORAL DAILY
Status: DISCONTINUED | OUTPATIENT
Start: 2024-09-14 | End: 2024-09-17

## 2024-09-14 RX ORDER — KETOROLAC TROMETHAMINE 30 MG/ML
30 INJECTION, SOLUTION INTRAMUSCULAR; INTRAVENOUS ONCE
Status: DISCONTINUED | OUTPATIENT
Start: 2024-09-14 | End: 2024-09-14 | Stop reason: HOSPADM

## 2024-09-14 RX ORDER — LIDOCAINE HYDROCHLORIDE AND EPINEPHRINE 15; 5 MG/ML; UG/ML
INJECTION, SOLUTION EPIDURAL AS NEEDED
Status: DISCONTINUED | OUTPATIENT
Start: 2024-09-14 | End: 2024-09-14 | Stop reason: SURG

## 2024-09-14 RX ORDER — SIMETHICONE 80 MG
80 TABLET,CHEWABLE ORAL 3 TIMES DAILY PRN
Status: DISCONTINUED | OUTPATIENT
Start: 2024-09-14 | End: 2024-09-17

## 2024-09-14 RX ORDER — MEPERIDINE HYDROCHLORIDE 25 MG/ML
12.5 INJECTION INTRAMUSCULAR; INTRAVENOUS; SUBCUTANEOUS ONCE AS NEEDED
Status: DISCONTINUED | OUTPATIENT
Start: 2024-09-14 | End: 2024-09-14 | Stop reason: HOSPADM

## 2024-09-14 RX ORDER — NALBUPHINE HYDROCHLORIDE 10 MG/ML
2.5 INJECTION, SOLUTION INTRAMUSCULAR; INTRAVENOUS; SUBCUTANEOUS EVERY 4 HOURS PRN
Status: DISCONTINUED | OUTPATIENT
Start: 2024-09-14 | End: 2024-09-17

## 2024-09-14 RX ORDER — ONDANSETRON 2 MG/ML
4 INJECTION INTRAMUSCULAR; INTRAVENOUS EVERY 6 HOURS PRN
Status: DISCONTINUED | OUTPATIENT
Start: 2024-09-14 | End: 2024-09-17

## 2024-09-14 RX ORDER — ENOXAPARIN SODIUM 100 MG/ML
40 INJECTION SUBCUTANEOUS DAILY
Status: DISCONTINUED | OUTPATIENT
Start: 2024-09-14 | End: 2024-09-17

## 2024-09-14 RX ORDER — DIPHENHYDRAMINE HYDROCHLORIDE 50 MG/ML
12.5 INJECTION INTRAMUSCULAR; INTRAVENOUS EVERY 4 HOURS PRN
Status: DISCONTINUED | OUTPATIENT
Start: 2024-09-14 | End: 2024-09-17

## 2024-09-14 RX ORDER — KETOROLAC TROMETHAMINE 30 MG/ML
30 INJECTION, SOLUTION INTRAMUSCULAR; INTRAVENOUS EVERY 6 HOURS
Status: COMPLETED | OUTPATIENT
Start: 2024-09-14 | End: 2024-09-15

## 2024-09-14 RX ORDER — IBUPROFEN 600 MG/1
600 TABLET, FILM COATED ORAL EVERY 6 HOURS
Status: DISCONTINUED | OUTPATIENT
Start: 2024-09-15 | End: 2024-09-17

## 2024-09-14 RX ORDER — CITRIC ACID/SODIUM CITRATE 334-500MG
SOLUTION, ORAL ORAL
Status: COMPLETED
Start: 2024-09-14 | End: 2024-09-14

## 2024-09-14 RX ORDER — HYDROMORPHONE HYDROCHLORIDE 1 MG/ML
0.4 INJECTION, SOLUTION INTRAMUSCULAR; INTRAVENOUS; SUBCUTANEOUS EVERY 5 MIN PRN
Status: DISCONTINUED | OUTPATIENT
Start: 2024-09-14 | End: 2024-09-14 | Stop reason: HOSPADM

## 2024-09-14 RX ORDER — PANTOPRAZOLE SODIUM 40 MG/1
40 TABLET, DELAYED RELEASE ORAL
Status: DISCONTINUED | OUTPATIENT
Start: 2024-09-14 | End: 2024-09-17

## 2024-09-14 RX ORDER — DEXAMETHASONE SODIUM PHOSPHATE 4 MG/ML
VIAL (ML) INJECTION AS NEEDED
Status: DISCONTINUED | OUTPATIENT
Start: 2024-09-14 | End: 2024-09-14 | Stop reason: SURG

## 2024-09-14 RX ORDER — DOCUSATE SODIUM 100 MG/1
100 CAPSULE, LIQUID FILLED ORAL
Status: DISCONTINUED | OUTPATIENT
Start: 2024-09-14 | End: 2024-09-17

## 2024-09-14 RX ORDER — AMMONIA INHALANTS 0.04 G/.3ML
0.3 INHALANT RESPIRATORY (INHALATION) AS NEEDED
Status: DISCONTINUED | OUTPATIENT
Start: 2024-09-14 | End: 2024-09-17

## 2024-09-14 RX ORDER — ONDANSETRON 2 MG/ML
4 INJECTION INTRAMUSCULAR; INTRAVENOUS ONCE AS NEEDED
Status: DISCONTINUED | OUTPATIENT
Start: 2024-09-14 | End: 2024-09-14 | Stop reason: HOSPADM

## 2024-09-14 RX ORDER — GABAPENTIN 300 MG/1
300 CAPSULE ORAL EVERY 8 HOURS PRN
Status: DISCONTINUED | OUTPATIENT
Start: 2024-09-14 | End: 2024-09-17

## 2024-09-14 RX ORDER — LIDOCAINE HCL/EPINEPHRINE/PF 2%-1:200K
VIAL (ML) INJECTION AS NEEDED
Status: DISCONTINUED | OUTPATIENT
Start: 2024-09-14 | End: 2024-09-14 | Stop reason: SURG

## 2024-09-14 RX ORDER — NALBUPHINE HYDROCHLORIDE 10 MG/ML
2.5 INJECTION, SOLUTION INTRAMUSCULAR; INTRAVENOUS; SUBCUTANEOUS
Status: DISCONTINUED | OUTPATIENT
Start: 2024-09-14 | End: 2024-09-14 | Stop reason: HOSPADM

## 2024-09-14 RX ORDER — ACETAMINOPHEN 500 MG
1000 TABLET ORAL EVERY 6 HOURS
Status: DISCONTINUED | OUTPATIENT
Start: 2024-09-14 | End: 2024-09-17

## 2024-09-14 RX ORDER — SODIUM CHLORIDE, SODIUM LACTATE, POTASSIUM CHLORIDE, CALCIUM CHLORIDE 600; 310; 30; 20 MG/100ML; MG/100ML; MG/100ML; MG/100ML
INJECTION, SOLUTION INTRAVENOUS CONTINUOUS PRN
Status: DISCONTINUED | OUTPATIENT
Start: 2024-09-14 | End: 2024-09-14 | Stop reason: SURG

## 2024-09-14 RX ORDER — MORPHINE SULFATE 2 MG/ML
INJECTION, SOLUTION INTRAMUSCULAR; INTRAVENOUS AS NEEDED
Status: DISCONTINUED | OUTPATIENT
Start: 2024-09-14 | End: 2024-09-14 | Stop reason: SURG

## 2024-09-14 RX ORDER — GLYCOPYRROLATE 0.2 MG/ML
INJECTION, SOLUTION INTRAMUSCULAR; INTRAVENOUS AS NEEDED
Status: DISCONTINUED | OUTPATIENT
Start: 2024-09-14 | End: 2024-09-14 | Stop reason: SURG

## 2024-09-14 RX ORDER — CEFAZOLIN SODIUM IN 0.9 % NACL 3 G/100 ML
3 INTRAVENOUS SOLUTION, PIGGYBACK (ML) INTRAVENOUS ONCE
Status: COMPLETED | OUTPATIENT
Start: 2024-09-14 | End: 2024-09-14

## 2024-09-14 RX ORDER — HYDROMORPHONE HYDROCHLORIDE 1 MG/ML
0.2 INJECTION, SOLUTION INTRAMUSCULAR; INTRAVENOUS; SUBCUTANEOUS EVERY 5 MIN PRN
Status: DISCONTINUED | OUTPATIENT
Start: 2024-09-14 | End: 2024-09-14 | Stop reason: HOSPADM

## 2024-09-14 RX ORDER — DIPHENHYDRAMINE HCL 25 MG
25 CAPSULE ORAL EVERY 4 HOURS PRN
Status: DISCONTINUED | OUTPATIENT
Start: 2024-09-14 | End: 2024-09-17

## 2024-09-14 RX ORDER — DEXTROSE, SODIUM CHLORIDE, SODIUM LACTATE, POTASSIUM CHLORIDE, AND CALCIUM CHLORIDE 5; .6; .31; .03; .02 G/100ML; G/100ML; G/100ML; G/100ML; G/100ML
INJECTION, SOLUTION INTRAVENOUS CONTINUOUS PRN
Status: DISCONTINUED | OUTPATIENT
Start: 2024-09-14 | End: 2024-09-17

## 2024-09-14 RX ORDER — ONDANSETRON 2 MG/ML
INJECTION INTRAMUSCULAR; INTRAVENOUS AS NEEDED
Status: DISCONTINUED | OUTPATIENT
Start: 2024-09-14 | End: 2024-09-14 | Stop reason: SURG

## 2024-09-14 RX ORDER — EPHEDRINE SULFATE 50 MG/ML
INJECTION INTRAVENOUS AS NEEDED
Status: DISCONTINUED | OUTPATIENT
Start: 2024-09-14 | End: 2024-09-14 | Stop reason: SURG

## 2024-09-14 RX ORDER — ROPIVACAINE HYDROCHLORIDE 5 MG/ML
INJECTION, SOLUTION EPIDURAL; INFILTRATION; PERINEURAL AS NEEDED
Status: DISCONTINUED | OUTPATIENT
Start: 2024-09-14 | End: 2024-09-14 | Stop reason: SURG

## 2024-09-14 RX ORDER — ALBUTEROL SULFATE 90 UG/1
2 INHALANT RESPIRATORY (INHALATION) EVERY 4 HOURS PRN
Status: DISCONTINUED | OUTPATIENT
Start: 2024-09-14 | End: 2024-09-17

## 2024-09-14 RX ADMIN — LIDOCAINE HCL/EPINEPHRINE/PF 10 ML: 2%-1:200K VIAL (ML) INJECTION at 02:43:00

## 2024-09-14 RX ADMIN — LIDOCAINE HYDROCHLORIDE AND EPINEPHRINE 3 ML: 15; 5 INJECTION, SOLUTION EPIDURAL at 00:11:00

## 2024-09-14 RX ADMIN — EPHEDRINE SULFATE 10 MG: 50 INJECTION INTRAVENOUS at 02:54:00

## 2024-09-14 RX ADMIN — GLYCOPYRROLATE 0.2 MG: 0.2 INJECTION, SOLUTION INTRAMUSCULAR; INTRAVENOUS at 03:00:00

## 2024-09-14 RX ADMIN — LIDOCAINE HCL/EPINEPHRINE/PF 5 ML: 2%-1:200K VIAL (ML) INJECTION at 02:50:00

## 2024-09-14 RX ADMIN — ROPIVACAINE HYDROCHLORIDE 5 ML: 5 INJECTION, SOLUTION EPIDURAL; INFILTRATION; PERINEURAL at 01:56:00

## 2024-09-14 RX ADMIN — LIDOCAINE HCL/EPINEPHRINE/PF 5 ML: 2%-1:200K VIAL (ML) INJECTION at 02:46:00

## 2024-09-14 RX ADMIN — MORPHINE SULFATE 2 MG: 2 INJECTION, SOLUTION INTRAMUSCULAR; INTRAVENOUS at 03:10:00

## 2024-09-14 RX ADMIN — LIDOCAINE HYDROCHLORIDE AND EPINEPHRINE 3 ML: 15; 5 INJECTION, SOLUTION EPIDURAL at 01:53:00

## 2024-09-14 RX ADMIN — ROPIVACAINE HYDROCHLORIDE 5 ML: 5 INJECTION, SOLUTION EPIDURAL; INFILTRATION; PERINEURAL at 00:15:00

## 2024-09-14 RX ADMIN — CEFAZOLIN SODIUM IN 0.9 % NACL 3 G: 3 G/100 ML INTRAVENOUS SOLUTION, PIGGYBACK (ML) INTRAVENOUS at 02:39:00

## 2024-09-14 RX ADMIN — DEXAMETHASONE SODIUM PHOSPHATE 4 MG: 4 MG/ML VIAL (ML) INJECTION at 02:45:00

## 2024-09-14 RX ADMIN — SODIUM CHLORIDE, SODIUM LACTATE, POTASSIUM CHLORIDE, CALCIUM CHLORIDE: 600; 310; 30; 20 INJECTION, SOLUTION INTRAVENOUS at 03:23:00

## 2024-09-14 RX ADMIN — SODIUM CHLORIDE, SODIUM LACTATE, POTASSIUM CHLORIDE, CALCIUM CHLORIDE: 600; 310; 30; 20 INJECTION, SOLUTION INTRAVENOUS at 02:39:00

## 2024-09-14 RX ADMIN — ONDANSETRON 8 MG: 2 INJECTION INTRAMUSCULAR; INTRAVENOUS at 02:45:00

## 2024-09-14 NOTE — PROGRESS NOTES
[facility]  Obstetrics and Gynecology    OB/GYN: Intrapartum Progress Note     SUBJECTIVE:  Patient is a 34 year old  female at 38w5d admitted for IOL. Patient reports doing well. Pain well controlled after second epidural.     OBJECTIVE:  Vitals:    24 0036 24 0045 24 0100 24 0118   BP: 148/71 152/79 158/82 148/86   Pulse: 88 60 60 60   Resp:       Temp:       TempSrc:       SpO2:       Weight:       Height:           Physical Exam:  General: AAO. NAD.   Fundus + gravid.    FHT: moderate variability / 130 BPM / + accel / +variable and late decel - prolong deceleration noted   [place]: q 2-3 min  SVE: 4-5/100/-2 s/p AROM with thick meconium     ASSESSMENT/PLAN:  Patient is a 34 year old  female at 38w5d admitted for IOL.     Doing well   Continue routine intrapartum care  Continue IOL but pitocin held 2/2 prolong deceleration. FHT reviewed and noted for minimal variability with recurrent decelerations which is a change from baseline. D/w patient concern for restarting pitocin. D/w patient continuing IOL versus PCS. Recommend PCS 2/2 NRFHT and fetal intolerance to labor. D/w patient risks, benefits and alternatives to procedure including but not limited to risk of infection, bleeding and injury.   D/w patient meconium stained fluid and possible outcomes including fetal respiratory distress. Neonatology requested to be present at delivery.   GBS negative   Continue epidural per request     Deann Kidd MD   [facility] - OBGYN

## 2024-09-14 NOTE — PROGRESS NOTES
Patient admitted to postpartum unit in stable condition.  Safety precautions initiated. Bed in low position. Call light in reach.  Patient and family oriented to room and mother baby unit.

## 2024-09-14 NOTE — ANESTHESIA POSTPROCEDURE EVALUATION
[facility] Patient Status:  Inpatient   Age/Gender 34 year old female MRN [id number]   Location [facility] LABOR & DELIVERY Attending Danita Diaz MD   Hosp Day # 1 PCP DANA BLANK       Anesthesia Post-op Note     SECTION    Procedure Summary       Date: 24 Room / Location:  L+D OR   L+D OR    Anesthesia Start:  Anesthesia Stop: 24    Procedure:  SECTION Diagnosis: (non reassuring fht's, fetal intolerance to labor)    Surgeons: Deann Kidd MD Anesthesiologist: Cheo Mills MD    Anesthesia Type: epidural ASA Status: 3            Anesthesia Type: epidural    Vitals Value Taken Time   /65 24 0335   Temp 97.8 24 0336   Pulse 60 24 0336   Resp 15 24 0336   SpO2 97 % 24 0336   Vitals shown include unfiled device data.    Patient Location: Labor and Delivery    Anesthesia Type: epidural    Airway Patency: patent    Postop Pain Control: adequate    Mental Status: preanesthetic baseline    Nausea/Vomiting: none    Cardiopulmonary/Hydration status: stable euvolemic    Complications: no apparent anesthesia related complications    Postop vital signs: stable    Dental Exam: Unchanged from Preop    Patient to be discharged from PACU when criteria met.

## 2024-09-14 NOTE — OPERATIVE REPORT
[facility]  Obstetrics and Gynecology   Section - Operative Note    Mary Huerta Patient Status:  Inpatient    2/15/1990 MRN [id number]   Location [facility] LABOR & DELIVERY Attending Danita Diaz MD   Hosp Day # 1 PCP DANA BLANK     Date of procedure: 24    Preoperative Diagnosis: IUP at Term, NRFHT, fetal intolerance to labor         Postoperative Diagnosis: Same - Delivered    Procedure: Primary Low Transverse  Section    Primary surgeon: Deann Kidd MD     Assistant: Dr. Anderson who was present throughout the entire case and was critical in ensuring adequate exposure for patient's safety and optimization of outcome.  The physician actively assisted in retraction, exposure, hemostasis, entry/closure as well as other essential operative technical functions.    Indications:  Patient is a 34 year old  at 38w5d who presented for IOL. 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 The risks, benefits and alternatives were d/w patient including but not limited to risk of injury, infection, bleeding and subsequent  section deliveries. All questions and concerns were addressed. Patient provided verbal and written consent.     Surgical Findings: normal tubes and ovaries   Baby - male \"Cabrera Bermudez\", apgars 5/9, wt 3360 g  Nuchal x 1, loose and reduced     Anesthesia: Spinal      Procedure: The patient was prepped and draped in the usual sterile fashion. A time out was performed and scd’s were placed to decrease her risk for DVT and a  dose of antibiotics was given preoperatively to decrease her risk for infection. After adequate level of anesthesia was ascertained, a Pfannenstiel incision was made and extended down to the level of the fascia. The fascia was incised and extended laterally bluntly.  The rectus muscles were  superiorly and inferiorly.  The peritoneal cavity was entered superiorly and extended inferiorly. An Fred O retractor was placed and adequate visualization of lower uterine segment was noted. The vesicouterine peritoneal fold was identified. A low transverse incision was made with scalpel, a Terrie clamp was used to penetrate through final layer and and the incision was extended using blunt finger dissection. The fetal head as noted to be cephalic. The fetal head was brought towards the incision. Fundal pressure applied and fetal head delivered without complications. Nuchal cord x 1, loose and reduced. The remainder of the fetal body delivered without complication. The infant was not vigorously crying. The cord was clamped and cut after 30 second delay. The infant was placed in the warmer to be evaluated by Neonatology who was present for delivery. A segment of the cord was obtained for cord blood gas analysis. Cord blood was obtained. The placenta was delivered intact with a 3 vessel cord. The pelvic organs were not exteriorized but palpated and appeared to be normal during the procedure. The uterine cavity was swept clean using a wet lap. The hysterotomy was closed using 0-Vicryl suture. A second imbricated layer of the same suture was placed. Good hemostasis noted. Re-inspection of the hysterotomy, peritomeum and rectus muscles was noted to be entirely hemostatic. The fascia was re-approximated with 0-Vicry suture in a running fashion. The subcutaneous tissue was copiously irrigated and any bleeding cauterized. The subcutaneous tissue was re-approximated using Plain gut suture. The skin was re-approximated with  Insorb staples.  The sponge and instrument counts were reported to me as being correct.  The patient tolerated the procedure and was stable to the recovery room.  The infant was stable to the recovery room.      Specimen:  cord blood gases, cord blood and placenta      Drains: santizo to dependant drainage    Condition:   stable    Complications: None; patient tolerated the procedure well.      Deann Kidd MD   [facility] - OBGYN          Note to patient and family   The 21st Century Cures Act makes medical notes available to patients in the interest of transparency.  However, please be advised that this is a medical document.  It is intended as [id number] communication.  It is written and medical language may contain abbreviations or verbiage that are technical and unfamiliar.  It may appear blunt or direct.  Medical documents are intended to carry relevant information, facts as evident, and the clinical opinion of the practitioner.

## 2024-09-14 NOTE — ANESTHESIA PROCEDURE NOTES
Labor Analgesia    Date/Time: 9/14/2024 1:29 AM    Performed by: Cheo Mills MD  Authorized by: Cheo Mills MD      General Information and Staff    Start Time:  9/14/2024 1:29 AM  End Time:  9/14/2024 1:53 AM  Anesthesiologist:  Cheo Mills MD  Performed by:  Anesthesiologist  Patient Location:  OB  Site Identification: surface landmarks    Reason for Block: labor epidural    Preanesthetic Checklist: patient identified, IV checked, risks and benefits discussed, monitors and equipment checked, pre-op evaluation, timeout performed, IV bolus, anesthesia consent and sterile technique used      Procedure Details    Patient Position:  Sitting  Prep: ChloraPrep    Monitoring:  Heart rate and continuous pulse ox  Approach:  Midline    Epidural Needle    Injection Technique:  YEFRI saline  Needle Type:  Tuohy  Needle Gauge:  17 G  Needle Length:  3.375 in  Needle Insertion Depth:  7  Location:  L3-4    Spinal Needle      Catheter    Catheter Type:  End hole  Catheter Size:  19 G  Catheter at Skin Depth:  14  Test Dose:  Negative    Assessment      Additional Comments     Patient in sitting position.  Existing epidural catheter removed with tip intact.  Lumbar area prepped and draped in sterile fashion.  Lido skin infiltration approximately 2 cm superior to initial presumed L3-L4 interspace.  17G epidural needle advanced midline at L3-L4 interspace into epidural space using YEFRI technique with saline.  Epidural catheter threaded easily.  After negative aspirate, test dose given at 0153 as noted and was negative.  Catheter taped and secured.  Epidural bolus given as noted, and infusion resumed.  No heme or paresthesias noted throughout.  Patient tolerated procedure well without any apparent complications.

## 2024-09-14 NOTE — PROGRESS NOTES
OB progress note    A/P: 34 year old  now POD#1 s/p PLTCS viable male at 38w5d on 24 due to fetal intolerance to labor during IOL for prolonged decleration on NST in the office being done for obesity. BP noted to be elevated on admission & still elevated after 4 hours meeting criteria for gestational hypertension without severe features.     Pregnancy significant for obesity, IUI/Clomid conception, partner with 47, XYY karyotype (Sawyer Syndrome), h/o prediabetes, subclinical hypothyroidism, GERD on PPI, asthma & allergies, eczema, Interstitial cystitis, dyspareunia, pelvic floor dysfunction. 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 Patient asymptomatic    Acute blood loss anemia  -Admission Hb 12.2 -> 9.7 on POD1  -IV iron sucrose 200 mg x 1 ordered 9/15/2024     BLE edema  -Lasix 20 mg PO BID & Kdur 20 mEq BID     Afebrile  Pain controlled  Rh+/GBS neg   SCDs, ambulation encouraged  Circumcision for infant requested by patient. Risks, benefits, alternatives discussed. All questions answered.    Disposition: inpatient     Subjective: Pain controlled. Lochia normal. Tolerating PO. +flatus. +voiding. + ambulating. Is breast feeding. No headaches, vision changes, epigastric pain. Thighs feel tight with swelling.     Vitals:    24 2018 09/15/24 0035 09/15/24 0318 09/15/24 0750   BP: 112/58 115/57 121/71 135/60   BP Location: Right arm Right arm Right arm Left arm   Pulse:  51 53 (!) 45 54   Resp: 18 18 18 16   Temp: 97.6 °F (36.4 °C)   97.5 °F (36.4 °C)   TempSrc: Oral   Oral   SpO2:       Weight:       Height:         Temp:  [97.5 °F (36.4 °C)-97.8 °F (36.6 °C)] 97.5 °F (36.4 °C)  Pulse:  [45-58] 54  Resp:  [16-18] 16  BP: (112-135)/(57-77) 135/60  SpO2:  [96 %] 96 %     iron sucrose  200 mg Intravenous Daily    levothyroxine  50 mcg Oral Daily    enoxaparin  40 mg Subcutaneous Daily    acetaminophen  1,000 mg Oral Q6H    ibuprofen  600 mg Oral Q6H    docusate sodium  100 mg Oral BID@0600,1800    pantoprazole  40 mg Intravenous QAM AC    Or    pantoprazole  40 mg Oral QAM AC     [unfilled]    Exam:  Gen A&O, NAD  CV RRR  Lungs CTAB  Abd soft, obese, non distended, fundus firm under umbilicus  Incision: clean/dry/intact  Ext nontender, 2+ BLE edema to thighs    Component      Latest Ref Rng 9/13/2024  1:37 PM 9/15/2024  8:32 AM   WBC      4.0 - 11.0 x10(3) uL 15.3 (H)  15.3 (H)    RBC      3.80 - 5.30 x10(6)uL 4.53  3.68 (L)    Hemoglobin      12.0 - 16.0 g/dL 12.2  9.7 (L)    Hematocrit      35.0 - 48.0 % 35.7  30.9 (L)    Platelet Count      150.0 - 450.0 10(3)uL 262.0  214.0    MCV      80.0 - 100.0 fL 78.8 (L)  84.0    MCH      26.0 - 34.0 pg 26.9  26.4    MCHC      31.0 - 37.0 g/dL 34.2  31.4    RDW      % 15.7  16.2    Prelim Neutrophil Abs      1.50 - 7.70 x10 (3) uL 10.80 (H)  9.67 (H)    Neutrophils Absolute      1.50 - 7.70 x10(3) uL 10.80 (H)  9.67 (H)    Lymphocytes Absolute      1.00 - 4.00 x10(3) uL 2.62  4.05 (H)    Monocytes Absolute      0.10 - 1.00 x10(3) uL 1.04 (H)  1.11 (H)    Eosinophils Absolute      0.00 - 0.70 x10(3) uL 0.54  0.31    Basophils Absolute      0.00 - 0.20 x10(3) uL 0.05  0.06    Immature Granulocyte Absolute      0.00 - 1.00 x10(3) uL 0.20  0.13    Neutrophils %      % 70.9  63.2    Lymphocytes %      % 17.2  26.4    Monocytes %      % 6.8  7.2    Eosinophils %      % 3.5  2.0    Basophils %      % 0.3  0.4    Immature Granulocyte %      % 1.3   0.8       Legend:  (H) High  (L) Low  Danita Diaz MD

## 2024-09-14 NOTE — ANESTHESIA PROCEDURE NOTES
Labor Analgesia    Date/Time: 9/13/2024 11:45 PM    Performed by: Cheo Mills MD  Authorized by: Cheo Mills MD      General Information and Staff    Start Time:  9/13/2024 11:45 PM  End Time:  9/14/2024 12:10 AM  Anesthesiologist:  Cheo Mills MD  Performed by:  Anesthesiologist  Patient Location:  OB  Site Identification: surface landmarks    Reason for Block: labor epidural    Preanesthetic Checklist: patient identified, IV checked, risks and benefits discussed, monitors and equipment checked, pre-op evaluation, timeout performed, IV bolus, anesthesia consent and sterile technique used      Procedure Details    Patient Position:  Sitting  Prep: ChloraPrep    Monitoring:  Heart rate and continuous pulse ox  Approach:  Midline    Epidural Needle    Injection Technique:  YEFRI saline  Needle Type:  Tuohy  Needle Gauge:  17 G  Needle Length:  3.375 in  Needle Insertion Depth:  8  Location:  L3-4    Spinal Needle      Catheter    Catheter Type:  End hole  Catheter Size:  19 G  Catheter at Skin Depth:  13.5  Test Dose:  Negative    Assessment      Additional Comments     Patient in sitting position with ASA monitors on.  Lumbar area prepped and draped in sterile fashion.  Lido 1% skin infiltration at L3-L4 interspace.  17G Touhy epidural needle advanced midline at L3-L4 interspace into epidural space using YEFRI technique with saline.  Upon advancement of catheter, patient noted right-sided paresthesias down to her right leg despite counterclockwise redirection of Touhy needle.  Catheter removed with tip intact, and Touhy also removed.  Additional lido 1% skin infiltration approximately one cm medial to initial infiltration site.  17G Touhy epidural needle agan advanced at L3-L4 interspace into epidural space using YEFIR technique with saline.  This time, catheter threaded easily and no paresthesias noted.  After negative aspirate, test dose given as noted and was negative.  Catheter taped and secured.   Epidural bolus given as noted, and infusion begun.  No heme noted throughout, and patient tolerated procedure well without any apparent anesthetic complications.

## 2024-09-14 NOTE — ANESTHESIA PREPROCEDURE EVALUATION
PRE-OP EVALUATION    Patient Name: Mary Huerta    Admit Diagnosis: PREGNANCY  Pregnancy (HCC)    Pre-op Diagnosis: * No surgery found *        Anesthesia Procedure: LABOR ANALGESIA    * Surgery not found *    Pre-op vitals reviewed.  Temp: 98.1 °F (36.7 °C)  Pulse: 60  Resp: 16  BP: 143/73     Body mass index is 42.68 kg/m².    Current medications reviewed.  Hospital Medications:   lactated ringers infusion   Intravenous Continuous    dextrose in lactated ringers 5% infusion   Intravenous PRN    lactated ringers IV bolus 500 mL  500 mL Intravenous PRN    acetaminophen (Tylenol Extra Strength) tab 500 mg  500 mg Oral Q6H PRN    acetaminophen (Tylenol Extra Strength) tab 1,000 mg  1,000 mg Oral Q6H PRN    ibuprofen (Motrin) tab 600 mg  600 mg Oral Once PRN    ondansetron (Zofran) 4 MG/2ML injection 4 mg  4 mg Intravenous Q6H PRN    oxyTOCIN in sodium chloride 0.9% (Pitocin) 30 Units/500mL infusion premix  62.5-900 hansel-units/min Intravenous Continuous    terbutaline (Brethine) 1 MG/ML injection 0.25 mg  0.25 mg Subcutaneous PRN    sodium citrate-citric acid (Bicitra) 500-334 MG/5ML oral solution 30 mL  30 mL Oral PRN    metoclopramide (Reglan) 5 mg/mL injection 10 mg  10 mg Intravenous Q6H PRN    HYDROmorphone (Dilaudid) 1 MG/ML injection 1 mg  1 mg Intravenous Q2H PRN    oxyTOCIN in sodium chloride 0.9% (Pitocin) 30 Units/500mL infusion premix  0.5-20 hansel-units/min Intravenous Continuous    lactated ringers IV bolus 1,000 mL  1,000 mL Intravenous Once    fentaNYL-bupivacaine 2 mcg/mL-0.125% in sodium chloride 0.9% 100 mL EPIDURAL infusion premix  12 mL/hr Epidural Continuous    fentaNYL (Sublimaze) 50 mcg/mL injection 100 mcg  100 mcg Epidural Once    EPHEDrine (PF) 25 MG/5 ML injection 5 mg  5 mg Intravenous PRN    nalbuphine (Nubain) 10 mg/mL injection 2.5 mg  2.5 mg Intravenous Q15 Min PRN       Outpatient Medications:     Medications Prior to Admission   Medication Sig Dispense Refill Last Dose     ferrous sulfate 325 (65 FE) MG Oral Tab EC Take 1 tablet (325 mg total) by mouth every other day.   Past Week at 1100    levothyroxine 50 MCG Oral Tab Take 1 tablet (50 mcg total) by mouth daily. Overdue for labs including recheck of TSH. 90 tablet 0 2024 at 0630    cetirizine 10 MG Oral Tab Take 1 tablet (10 mg total) by mouth daily.   2024 at 2100    Methylcobalamin (B12-ACTIVE OR) Take by mouth.   2024 at 2100    omeprazole 20 MG Oral Capsule Delayed Release Take 1 capsule (20 mg total) by mouth daily. 90 capsule 3 2024 at 2100    montelukast 10 MG Oral Tab Take 1 tablet (10 mg total) by mouth nightly.   Past Month at 2100    prenatal vitamin with DHA 27-0.8-228 MG Oral Cap Take 1 capsule by mouth daily.   2024 at 2100    Cholecalciferol (VITAMIN D-3) 25 MCG (1000 UT) Oral Cap Take 6,000 Int'l Units by mouth daily.   2024 at 2100    lidocaine-prilocaine 2.5-2.5 % External Cream Apply topically as needed.   More than a month    [] amoxicillin clavulanate 875-125 MG Oral Tab Take 1 tablet by mouth 2 (two) times daily for 10 days. 20 tablet 0     albuterol (5 MG/ML) 0.5% Inhalation Nebu Soln Take 0.5 mL (2.5 mg total) by nebulization every 6 (six) hours as needed.   More than a month    albuterol (2.5 MG/3ML) 0.083% Inhalation Nebu Soln Take 3 mL (2.5 mg total) by nebulization every 6 (six) hours as needed.   More than a month       Allergies: Cephalexin, Mold, Molds & smuts, Nexplanon [etonogestrel], Hydrocodone, Kdc:acetaminophen+sorbitan+propoxyphene, Cobalt, Pollen, Adhesive tape, and Topiramate      Anesthesia Evaluation        Anesthetic Complications           GI/Hepatic/Renal      (+) GERD                           Cardiovascular        Exercise tolerance: good     MET: >4    (+) obesity  (+) hypertension                                     Endo/Other           (+) hypothyroidism                       Pulmonary      (+) asthma                     Neuro/Psych                                       Past Surgical History:   Procedure Laterality Date    Appendectomy  10/11/2014    Colonoscopy & polypectomy  2019    Does have polyps. Was told every 3 years    Cystourethroscopy  10/18/2023    Cystoscopy Dr. Ko Flores for gross hematuria. Unremarkable.    Egd  2019    esophagus inflamed. Was having dysphagia & GERD.    Hysterosalpingogram - external referral  11/09/2023    normal cavity. By IVF doctor Quang Walker    Insert contraceptive capsul  2012    Nexplanon    Knee surgery Left 01/2019    Laparoscopic appendectomy  10/11/2014    not ruptured    Laparoscopic cholecystectomy  2012    +Stones & sludge    Other surgical history Right 08/09/2023    right ringer finger cyst removal    Removal of contraceptive capsul  2014    H/o \"severe allergic reaction\" to birth control (Nexplanon) which consisted of severe migraines, visual issues. Reports this episode occurred after one year with nexplanon. Initially improved after removal of nexplanon implant, but then reoccurred with subsequent menstrual cycle. States these symptoms significantly improved with nuva ring and cessation of menstrual cycles    Removal of ovarian cyst(s) Right 10/11/2014    done at same time of appendectomy. Maybe just functional cyst    Sinus surgery    2019    deviated septum    Spinal puncture,lumbar,diagnostic  2014    She had an LP in 2014 which, per report, showed the following:-- CSF May 2014. Glc 51, protein 34, WBC 0, RBC 6, VDRL negative, OCB negative, opening pressure 14.5 cm in lateral position.    Tonsillectomy  1995     Social History     Socioeconomic History    Marital status:    Tobacco Use    Smoking status: Never     Passive exposure: Never    Smokeless tobacco: Never   Vaping Use    Vaping status: Never Used   Substance and Sexual Activity    Alcohol use: Yes     Comment: rarely    Drug use: Never    Sexual activity: Yes     Partners: Male     History   Drug Use Unknown     Available pre-op labs  reviewed.  Lab Results   Component Value Date    WBC 15.3 (H) 09/13/2024    WBC 12.1 (H) 06/28/2024    RBC 4.53 09/13/2024    RBC 4.54 06/28/2024    HGB 12.2 09/13/2024    HGB 11.7 06/28/2024    HCT 35.7 09/13/2024    HCT 37.1 06/28/2024    MCV 78.8 (L) 09/13/2024    MCV 81.7 06/28/2024    MCH 26.9 09/13/2024    MCH 25.8 (L) 06/28/2024    MCHC 34.2 09/13/2024    MCHC 31.5 (L) 06/28/2024    RDW 15.7 09/13/2024    RDW 13.7 06/28/2024    .0 09/13/2024     06/28/2024     Lab Results   Component Value Date     09/13/2024    K 4.1 09/13/2024     09/13/2024    CO2 21.0 09/13/2024    BUN 10 09/13/2024    CREATSERUM 0.83 09/13/2024    GLU 71 09/13/2024    CA 9.4 09/13/2024            Airway      Mallampati: II  Mouth opening: >3 FB  TM distance: > 6 cm  Neck ROM: full Cardiovascular    Cardiovascular exam normal.         Dental             Pulmonary    Pulmonary exam normal.                 Other findings  Dentition grossly normal.            ASA: 3   Plan: epidural  NPO status verified and patient meets guidelines.          Plan/risks discussed with: patient                Present on Admission:   Obesity affecting pregnancy in third trimester (HCC)   Non-stress test with decelerations   38 weeks gestation of pregnancy ([facility])

## 2024-09-15 PROBLEM — D62 ACUTE BLOOD LOSS ANEMIA: Status: ACTIVE | Noted: 2024-09-15

## 2024-09-15 LAB
BASOPHILS # BLD AUTO: 0.06 X10(3) UL (ref 0–0.2)
BASOPHILS NFR BLD AUTO: 0.4 %
EOSINOPHIL # BLD AUTO: 0.31 X10(3) UL (ref 0–0.7)
EOSINOPHIL NFR BLD AUTO: 2 %
ERYTHROCYTE [DISTWIDTH] IN BLOOD BY AUTOMATED COUNT: 16.2 %
HCT VFR BLD AUTO: 30.9 %
HGB BLD-MCNC: 9.7 G/DL
IMM GRANULOCYTES # BLD AUTO: 0.13 X10(3) UL (ref 0–1)
IMM GRANULOCYTES NFR BLD: 0.8 %
LYMPHOCYTES # BLD AUTO: 4.05 X10(3) UL (ref 1–4)
LYMPHOCYTES NFR BLD AUTO: 26.4 %
MCH RBC QN AUTO: 26.4 PG (ref 26–34)
MCHC RBC AUTO-ENTMCNC: 31.4 G/DL (ref 31–37)
MCV RBC AUTO: 84 FL
MONOCYTES # BLD AUTO: 1.11 X10(3) UL (ref 0.1–1)
MONOCYTES NFR BLD AUTO: 7.2 %
NEUTROPHILS # BLD AUTO: 9.67 X10 (3) UL (ref 1.5–7.7)
NEUTROPHILS # BLD AUTO: 9.67 X10(3) UL (ref 1.5–7.7)
NEUTROPHILS NFR BLD AUTO: 63.2 %
PLATELET # BLD AUTO: 214 10(3)UL (ref 150–450)
RBC # BLD AUTO: 3.68 X10(6)UL
WBC # BLD AUTO: 15.3 X10(3) UL (ref 4–11)

## 2024-09-15 RX ORDER — FUROSEMIDE 20 MG
20 TABLET ORAL
Status: DISCONTINUED | OUTPATIENT
Start: 2024-09-15 | End: 2024-09-17

## 2024-09-15 RX ORDER — POTASSIUM CHLORIDE 1500 MG/1
20 TABLET, EXTENDED RELEASE ORAL 2 TIMES DAILY
Status: DISCONTINUED | OUTPATIENT
Start: 2024-09-15 | End: 2024-09-17

## 2024-09-15 NOTE — PROGRESS NOTES
[facility] pao Huerta Patient Status:  Inpatient   Age/Gender 34 year old female MRN [id number]   Location [facility] Attending Danita Diaz MD   Hosp Day # 2 PCP DANA BLANK      Anesthesia Pain Progress Note    Anesthesia Technique:   Epidural Anesthesia     Pain Management Technique:  In addition to available oral supplemental and IV medications  Patient received neuraxial preservative free morphine for post procedural pain control.    Post Procedure Pain Quality:    Adequate    Pain Management Side Effects:  None     /60 (BP Location: Left arm)   Pulse 54   Temp 97.5 °F (36.4 °C) (Oral)   Resp 16   Ht 1.753 m (5' 9\")   Wt 131.1 kg (289 lb)   LMP 2023   SpO2 96%   Breastfeeding Yes   BMI 42.68 kg/m²       Injection Site: Injection site is intact on inspection     Complications from Pain Management or Anesthesia:   None    All patient questions were answered.  Follow up pain management is separate from intraoperative anesthetic needs.  Pain care is transitioned to primary service, with management by oral medications.    Thank you for asking us to participate in the care of your patient.    Mckinley Canada MD, 09/15/24, 8:47 AM      Mckinley Canada MD, 09/15/24, 8:47 AM

## 2024-09-15 NOTE — PLAN OF CARE
Problem: POSTPARTUM  Goal: Long Term Goal:Experiences normal postpartum course  Description: INTERVENTIONS:  - Assess and monitor vital signs and lab values.  - Assess fundus and lochia.  - Provide ice/sitz baths for perineum discomfort.  - Monitor healing of incision/episiotomy/laceration, and assess for signs and symptoms of infection and hematoma.  - Assess bladder function and monitor for bladder distention.  - Provide/instruct/assist with pericare as needed.  - Provide VTE prophylaxis as needed.  - Monitor bowel function.  - Encourage ambulation and provide assistance as needed.  - Assess and monitor emotional status and provide social service/psych resources as needed.  - Utilize standard precautions and use personal protective equipment as indicated. Ensure aseptic care of all intravenous lines and invasive tubes/drains.  - Obtain immunization and exposure to communicable diseases history.  9/15/2024 0855 by Kaylie Tobias, RN  Outcome: Progressing  9/15/2024 0851 by Kaylie Tobias, RN  Outcome: Progressing  Goal: Optimize infant feeding at the breast  Description: INTERVENTIONS:  - Initiate breast feeding within first hour after birth.   - Monitor effectiveness of current breast feeding efforts.  - Assess support systems available to mother/family.  - Identify cultural beliefs/practices regarding lactation, letdown techniques, maternal food preferences.  - Assess mother's knowledge and previous experience with breast feeding.  - Provide information as needed about early infant feeding cues (e.g., rooting, lip smacking, sucking fingers/hand) versus late cue of crying.  - Discuss/demonstrate breast feeding aids (e.g., infant sling, nursing footstool/pillows, and breast pumps).  - Encourage mother/other family members to express feelings/concerns, and actively listen.  - Educate father/SO about benefits of breast feeding and how to manage common lactation challenges.  - Recommend avoidance of specific  medications or substances incompatible with breast feeding.  - Assess and monitor for signs of nipple pain/trauma.  - Instruct and provide assistance with proper latch.  - Review techniques for milk expression (breast pumping) and storage of breast milk. Provide pumping equipment/supplies, instructions and assistance, as needed.  - Encourage rooming-in and breast feeding on demand.  - Encourage skin-to-skin contact.  - Provide LC support as needed.  - Assess for and manage engorgement.  - Provide breast feeding education handouts and information on community breast feeding support.   9/15/2024 0855 by Kaylie Tobias RN  Outcome: Progressing  9/15/2024 0851 by Kaylie Tobias RN  Outcome: Progressing  Goal: Establishment of adequate milk supply with medication/procedure interruptions  Description: INTERVENTIONS:  - Review techniques for milk expression (breast pumping).   - Provide pumping equipment/supplies, instructions, and assistance until it is safe to breastfeed infant.  9/15/2024 0855 by Kaylie Tobias RN  Outcome: Progressing  9/15/2024 0851 by Kaylie Tobias RN  Outcome: Progressing  Goal: Appropriate maternal -  bonding  Description: INTERVENTIONS:  - Assess caregiver- interactions.  - Assess caregiver's emotional status and coping mechanisms.  - Encourage caregiver to participate in  daily care.  - Assess support systems available to mother/family.  - Provide /case management support as needed.  9/15/2024 0855 by Kaylie Tobias RN  Outcome: Progressing  9/15/2024 0851 by Kaylie Tobias RN  Outcome: Progressing

## 2024-09-16 NOTE — DISCHARGE INSTRUCTIONS
[facility] DISCHARGE INSTRUCTIONS     CONGRATULATIONS on the birth of your baby!!  We are happy that we have been able to be of service to you during your pregnancy.  We trust that your experience in the hospital and with the birth of your child has been a pleasant one.  These instructions are for your reference concerning your care at home between now and your follow up visit.  Please call the office within the next few days to schedule your appointment.    REST  Since fatigue will probably be your biggest problem, you should try to rest in the morning and in the afternoon for at least 1½-2 hours if possible.  Getting up in the middle of the night to feed your baby interrupts your sleep cycle; two 4 hour periods of sleep do not equal one 8 hour period.  During the day while your baby is sleeping, do whatever you can to isolate yourself from the rest of the world so you can rest (i.e. turn off the phone ringer, put a sign on the front doorbell).    POSTPARTUM BLUES/DEPRESSION  Mood swings, crying spells, feeling overwhelmed and irritability are very common after childbirth.  Most women (50-80%) will experience some of these symptoms, which can last from a few days to 2 weeks.  If your symptoms last more than 2 weeks and/or include any of the following, you may have postpartum depression:  -feelings of sadness      -inability to care for yourself or your baby  -loss of interest in usual activities     -recurring thoughts of death/suicide  -difficulty sleeping or increased need for sleep   -feelings of worthlessness/hopelessness    Postpartum depression occurs in 8-12% of new mothers and is more common in mothers with a past history of depression.  If you have any of these symptoms and/or they persist for more than 2 weeks, please call our office/answering service or [facility] at [phone] or the Mom's Help Line at [phone].    BREAST FEEDING  It is important to  be in a relaxed atmosphere when you are nursing.  You will find that your breasts will engorge and become tender within the next few days.  Even though the suction your baby creates may not meet your expectations, remember that he/she is just learning.  If your breasts remain hard after nursing, you may use a breast pump to release the remaining milk.  This will stimulate your breasts to produce more milk when the need arises.  If your nipples become sore, you should use Lanisol (lanolin) cream and allow your nipples to air-dry after nursing.  It will take approximately 10-12 days for milk supply and demand to balance, so please be patient.  Breastfeeding requires ample rest, fluids (8-10 glasses of water/day) and adequate calories (approximately 2200cal/day).  Please continue your prenatal vitamins the entire time you are breastfeeding.  The lactation consultants at [facility] are available at [phone] to answer any questions or help with any problems.    BREAST CARE  We advise non-nursing mothers to continuously wear a tight-fitting bra and avoid any nipple/breast stimulation.  You should keep your bra as tight as you can tolerate, as this will help dry up your milk.  If your breasts are painful and feel engorged, you may use ice bags and Motrin for relief.  The engorgement and pain/discomfort will usually resolve within 1 week.  You may have some milky discharge from your breasts for several weeks.           EPISIOTOMY CARE  If you have an episiotomy/tear, the stitches used to close the incision will dissolve by themselves.  This process will take at least six weeks during which you should be careful with the area.  If peripads tend to chafe and irritate your skin, we recommend that you place a Tucks, a gauze filled with soothing medication, between your stitches and the peripad.  Tucks can by purchased at your local pharmacy.  Sitz baths may also aid in pain relief and healing; soak your bottom in a tub filled  with room temperature water for 20 minutes once or twice per day.  We prefer that you take showers rather than baths, and avoid swimming, for 4-6 weeks after delivery.    SEXUAL INTERCOURSE  Please avoid tampons, douching and intercourse for at least 4-6 weeks, or until you have stopped bleeding, whichever is longer.  When you do resume intercourse, realize that you can start ovulating/become pregnant as early as 2-3 weeks after delivery, even if you are breastfeeding.  Please ask one of your doctors or call the office if you have any questions or desire contraception for use before your six-week check-up.  You may notice more vaginal dryness than usual, especially if you are breastfeeding.  An over-the-counter lubricant such as Replens or Astroglide may provide some relief.  Pain/discomfort at the episiotomy site is not unusual and may last anywhere from 2 weeks to several months.  Massaging the area may help to soften the scar tissue and hasten the healing process.    MENSTRUATION  You may have a vaginal discharge/light bleeding for anywhere from 2-6 weeks after delivery.  The flow may taper off and then suddenly become heavier a few weeks later.  Your first real period may come any time from 4-12 weeks after delivery, but may not come at all if you are nursing.    EXERCISE  We recommend that you avoid strenuous exercise or housework for at least 3-6 weeks after delivery. Realize that your exercise tolerance will be reduced, and therefore you should start slowly and increase gradually.  Walking is acceptable, unless it causes too much discomfort or fatigue.  You should climb stairs the least amount possible for the first week or two after delivery.  When you do climb them, take them slowly.  Avoid making numerous trips when you can organize and make just one.  You should also avoid driving a car, if possible, for the first 1-2 weeks.  Kegels exercises are important to maintain good pelvic muscle tone and help  eliminate any urine leakage.  The exercises involve tightening the muscles around the vagina; try stopping urination in midstream or squeezing around your fingers to learn the correct muscle groups.  Do these exercises several times a day in repetitions of 10; try doing them every time you get to a stoplight or a commercial comes on the TV.    VITAMINS  We strongly encourage you to continue taking your prenatal vitamin until your six-week check-up or until you stop nursing, whichever is longer.  The iron in the vitamin will help to resolve any anemia from the blood loss of the delivery.  Adequate calcium intake is important for all women, but especially while nursing.  Your total calcium intake should be 1200mg/day.  Since the average women gets approximately 500mg in her diet, most women will require 500-700mg of supplemental calcium/day.  You can purchase a supplement such as Tums Ex, Citracal, Oscal or Viactin at your local pharmacy.    WE WISH YOU MUCH FLETCHER WITH YOUR NEW BABY AND MAY YOUR NIGHTS BE RESTFUL!!    SPECIAL INSTRUCTIONS FOLLOWING YOUR   As a result of the abdominal incision, recovery from a  Section is more involved than the recovery from a vaginal delivery.  Activities are more restricted, and you should not lift more than the weight of your baby for about 6 weeks unless absolutely necessary.  The most dangerous place we go is our roads.  If you cannot make a split second movement, then you should not be driving.  For some that might take 10 days, for others 4 or more weeks.  We recommend showers rather than baths for the first 2-3 weeks.  In the beginning, you will tire more easily and require additional rest.  You should continue taking prenatal vitamins until your 6 week checkup, or as long as you are nursing.    The incision requires about 6 weeks to heal completely.  You may notice a small amount of drainage for the first few days after your surgery.  Please call right away if  you notice an increasing amount of discharge, increasing redness/pain, or if there is an odor.    Vaginal bleeding should resemble a normal to light period for anywhere from 2-6 weeks.  You may experience a day or two of heavy bleeding with blood clots during this time.  If there is persistent heavy bleeding, please notify us.  Please refrain from sexual intercourse, douching or tampons for 6 weeks (even in the absence of an episiotomy) because of the risk of pelvic infection.      Please call our office in the next few days to arrange for both your 6 week postpartum visit and a 2 week postoperative visit.

## 2024-09-16 NOTE — PROGRESS NOTES
SECTION POSTOPERATIVE DAY 2    Subjective:   Patient without complaints.  Ambulating without difficulty.  Pain well controlled.  Tolerating general diet.  No headaches, visual changes or epigastric pain.     Objective:   Patient Vitals for the past 24 hrs:   BP Temp Temp src Pulse Resp   24 0800 135/70 97.8 °F (36.6 °C) Oral 74 16   09/15/24 1943 124/72 -- -- -- --   09/15/24 1925 142/75 98 °F (36.7 °C) Oral 59 18     Tmax: Temp (24hrs), Av.9 °F (36.6 °C), Min:97.8 °F (36.6 °C), Max:98 °F (36.7 °C)    Abdomen- Soft, non-tender, non-distended, uterus firm, appropriate size  Incision- Clean, dry and intact  Extremities- Non-tender, no Grzegorz's sign    Data Review:  Recent Labs   Lab 24  1337 09/15/24  0832   RBC 4.53 3.68*   HGB 12.2 9.7*   HCT 35.7 30.9*   MCV 78.8* 84.0   MCH 26.9 26.4   MCHC 34.2 31.4   RDW 15.7 16.2   NEPRELIM 10.80* 9.67*   WBC 15.3* 15.3*   .0 214.0     Assessment/Plan:   Postoperative day 2, status post primary  section for fetal intolerance.     New onset GHtn diagnosed on admission, no severe features.  BP's stable on Lasix  Doing well.  Routine postpartum/postoperative care.  Encouraged ambulation.  Infant not feeding well.  Plan for discharge to home tomorrow.        Nakul Onofre MD  [facility]- Obstetrics & Gynecology

## 2024-09-17 VITALS
HEART RATE: 57 BPM | BODY MASS INDEX: 42.8 KG/M2 | HEIGHT: 69 IN | OXYGEN SATURATION: 96 % | WEIGHT: 289 LBS | TEMPERATURE: 98 F | SYSTOLIC BLOOD PRESSURE: 127 MMHG | DIASTOLIC BLOOD PRESSURE: 75 MMHG | RESPIRATION RATE: 16 BRPM

## 2024-09-17 RX ORDER — IBUPROFEN 600 MG/1
600 TABLET, FILM COATED ORAL EVERY 6 HOURS PRN
Qty: 30 TABLET | Refills: 0 | Status: SHIPPED | OUTPATIENT
Start: 2024-09-17

## 2024-09-17 NOTE — PLAN OF CARE
Problem: POSTPARTUM  Goal: Long Term Goal:Experiences normal postpartum course  Description: INTERVENTIONS:  - Assess and monitor vital signs and lab values.  - Assess fundus and lochia.  - Provide ice/sitz baths for perineum discomfort.  - Monitor healing of incision/episiotomy/laceration, and assess for signs and symptoms of infection and hematoma.  - Assess bladder function and monitor for bladder distention.  - Provide/instruct/assist with pericare as needed.  - Provide VTE prophylaxis as needed.  - Monitor bowel function.  - Encourage ambulation and provide assistance as needed.  - Assess and monitor emotional status and provide social service/psych resources as needed.  - Utilize standard precautions and use personal protective equipment as indicated. Ensure aseptic care of all intravenous lines and invasive tubes/drains.  - Obtain immunization and exposure to communicable diseases history.  Outcome: Progressing  Goal: Optimize infant feeding at the breast  Description: INTERVENTIONS:  - Initiate breast feeding within first hour after birth.   - Monitor effectiveness of current breast feeding efforts.  - Assess support systems available to mother/family.  - Identify cultural beliefs/practices regarding lactation, letdown techniques, maternal food preferences.  - Assess mother's knowledge and previous experience with breast feeding.  - Provide information as needed about early infant feeding cues (e.g., rooting, lip smacking, sucking fingers/hand) versus late cue of crying.  - Discuss/demonstrate breast feeding aids (e.g., infant sling, nursing footstool/pillows, and breast pumps).  - Encourage mother/other family members to express feelings/concerns, and actively listen.  - Educate father/SO about benefits of breast feeding and how to manage common lactation challenges.  - Recommend avoidance of specific medications or substances incompatible with breast feeding.  - Assess and monitor for signs of nipple  pain/trauma.  - Instruct and provide assistance with proper latch.  - Review techniques for milk expression (breast pumping) and storage of breast milk. Provide pumping equipment/supplies, instructions and assistance, as needed.  - Encourage rooming-in and breast feeding on demand.  - Encourage skin-to-skin contact.  - Provide LC support as needed.  - Assess for and manage engorgement.  - Provide breast feeding education handouts and information on community breast feeding support.   Outcome: Progressing  Goal: Establishment of adequate milk supply with medication/procedure interruptions  Description: INTERVENTIONS:  - Review techniques for milk expression (breast pumping).   - Provide pumping equipment/supplies, instructions, and assistance until it is safe to breastfeed infant.  Outcome: Progressing  Goal: Appropriate maternal -  bonding  Description: INTERVENTIONS:  - Assess caregiver- interactions.  - Assess caregiver's emotional status and coping mechanisms.  - Encourage caregiver to participate in  daily care.  - Assess support systems available to mother/family.  - Provide /case management support as needed.  Outcome: Progressing   Sat with patient and updated patient and family on plan of care. Pain medication discussed and answered all questions. Vitals are WNL. Breastfeeding on demand and breastfeeding successfully. Pumping to supplement baby when necessary. Lochia WNL and fundus is firm. Will call RN with any questions. .

## 2024-09-17 NOTE — PLAN OF CARE

## 2024-09-17 NOTE — PROGRESS NOTES
Went over discharge paperwork with patient. Patient understands she needs to go in for a follow up appointment on Friday for blood pressure and incision check. Bands matched with infant. Went over signs and symptoms of preeclampsia- pt encouraged to call OB with any concerns.

## 2024-09-17 NOTE — DISCHARGE SUMMARY
[facility]  Discharge Summary    Mary Huerta Patient Status:  inpatient    2/15/1990 MRN [id number]   Location 2193/2193-A Attending [facility]   Hosp Day # 4 PCP DANA BLANK     Date of Admission: 2024    Date of Discharge: 24    Admitting Diagnosis: PREGNANCY  Pregnancy (HCC)    Discharge Diagnosis:  s/p pCS    Hospital Course: The patient is a 34 year old female now  who was admitted on  at 38wk4d for IOL 2/2 prolonged deceleration on NST being done for routine fetal surveillance due to obesity. Due to NRFHT,  was recommended. Discussion held with patient about risks, benefits and alternatives to procedure. The patient provided verbal and written consent. S/p pCS on . Please refer to operative note for further details. Met criteria for gHTN. PIH labs wnl. Received Lasix for edema. Discharged without oral BP meds.  By time of discharge on POD#3, pain was well controlled and she was ambulating, voiding spontaneously, tolerating  a general diet and passing gas. She was discharged to home and instructed to follow up for BP check.    Procedures: CS    Complications: gHTN    Discharge Condition: Stable    OBJECTIVE:    Vital signs in last 24 hours:  Temp:  [97.6 °F (36.4 °C)-98.1 °F (36.7 °C)] 97.6 °F (36.4 °C)  Pulse:  [57-69] 57  Resp:  [16] 16  BP: (127-149)/(75-79) 127/75    Input/Output:  No intake or output data in the 24 hours ending 24 1445    Gen: alert, normal affect, no apparent distress   CV: RRR, no murmurs/rubs/gallops; 2+ edema bilaterally   Resp: lungs CTA bilaterally, no rales/rhonchi/wheezes; normal effort   Abd: soft, nondistended, appropriately tender, normal active bowel sounds; fundus firm and below umbilicus   INCISION: c/d/I    Data Reviewed:  Recent Labs   Lab 24  1337 09/15/24  0832   RBC 4.53 3.68*   HGB 12.2 9.7*   HCT 35.7 30.9*   MCV 78.8* 84.0   MCH 26.9 26.4   MCHC 34.2 31.4   RDW 15.7 16.2   NEPRELIM 10.80* 9.67*   WBC  15.3* 15.3*   .0 214.0       Discharge Medications: Current Discharge Medication List       Medication List        START taking these medications      ibuprofen 600 MG Tabs  Commonly known as: Motrin  Take 1 tablet (600 mg total) by mouth every 6 (six) hours as needed for Pain.            CONTINUE taking these medications      * albuterol (5 MG/ML) 0.5% Nebu  Commonly known as: Ventolin     * albuterol (2.5 MG/3ML) 0.083% Nebu  Commonly known as: Ventolin     B12-ACTIVE OR     cetirizine 10 MG Tabs  Commonly known as: ZyrTEC     ferrous sulfate 325 (65 FE) MG Tbec     levothyroxine 50 MCG Tabs  Commonly known as: Synthroid  Take 1 tablet (50 mcg total) by mouth daily. Overdue for labs including recheck of TSH.     lidocaine-prilocaine 2.5-2.5 % Crea  Commonly known as: Emla     montelukast 10 MG Tabs  Commonly known as: Singulair     omeprazole 20 MG Cpdr  Commonly known as: PriLOSEC  Take 1 capsule (20 mg total) by mouth daily.     prenatal vitamin with DHA 27-0.8-228 MG Caps     Vitamin D-3 25 MCG (1000 UT) Caps           * This list has 2 medication(s) that are the same as other medications prescribed for you. Read the directions carefully, and ask your doctor or other care provider to review them with you.                STOP taking these medications      amoxicillin clavulanate 875-125 MG Tabs  Commonly known as: Augmentin               Where to Get Your Medications        These medications were sent to [facility] [id number] [facility] - [place], [place] - [address] [phone], [phone]  [address] [facility] [facility] [address]      Phone: [phone]   ibuprofen 600 MG Tabs           Follow up Visits: Follow-up with [facility] for BP check      Ross Christensen MD  [facility] - [facility]BGYN      Note to patient and family   The 21st Century Cures Act makes medical notes available to patients in the interest of transparency.  However, please be advised that this is a medical document.  It is intended as  [id number] communication.  It is written and medical language may contain abbreviations or verbiage that are technical and unfamiliar.  It may appear blunt or direct.  Medical documents are intended to carry relevant information, facts as evident, and the clinical opinion of the practitioner.

## 2024-09-19 ENCOUNTER — TELEPHONE (OUTPATIENT)
Dept: OBGYN UNIT | Facility: HOSPITAL | Age: 34
End: 2024-09-19

## 2024-09-20 ENCOUNTER — POSTPARTUM (OUTPATIENT)
Dept: OBGYN CLINIC | Facility: CLINIC | Age: 34
End: 2024-09-20
Payer: COMMERCIAL

## 2024-09-20 VITALS
WEIGHT: 269 LBS | DIASTOLIC BLOOD PRESSURE: 80 MMHG | SYSTOLIC BLOOD PRESSURE: 131 MMHG | BODY MASS INDEX: 40 KG/M2 | HEART RATE: 71 BPM

## 2024-09-20 PROCEDURE — 3079F DIAST BP 80-89 MM HG: CPT

## 2024-09-20 PROCEDURE — 3075F SYST BP GE 130 - 139MM HG: CPT

## 2024-09-20 NOTE — PROGRESS NOTES
Mary Huerta is a 34 year old female  Patient's last menstrual period was 2023.   Chief Complaint   Patient presents with    Postpartum Care     BP check  Patient delivered on 24, Male, , Breastfeeding    .    OBSTETRICS HISTORY:  OB History    Para Term  AB Living   3 1 1   2 1   SAB IAB Ectopic Multiple Live Births   2     0 1      # Outcome Date GA Lbr Mik/2nd Weight Sex Type Anes PTL Lv   3 Term 24 38w5d  7 lb 6.5 oz (3.36 kg) M Caesarean EPI N DENNY      Complications: Variable decelerations   2 SAB 2023 4w0d    SAB   FD      Birth Comments: Chemical pregnancy right after coming off NuvaRing   1 SAB               Obstetric Comments   2023 - Chemical SAB. Dating uncertain. Conceived right after stopping NuvaRing. Had a lot of bleeding though. Very faint pregnancy test.       14 - Clomid & IUI per [facility] (ovulatory dysfunction). Partner with 47, XYY karyotype (Sawyer Syndrome) - was still able to use his sperm. Obesity, h/o prediabetes, subclinical hypothyroidism, GERD on PPI, asthma & allergies, eczema, Interstitial cystitis, dyspareunia, pelvic floor dysfunction. 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 IOL for prolonged deceleration on routine NST in office. BP elevated on admission to L&D ->met criteria for gestational hypertension. PLTCS for fetal intolerance to labor.        GYNE HISTORY:      History   Sexual Activity    Sexual activity: Yes    Partners: Male                 MEDICAL HISTORY:  Past Medical History:    Abdominal pain    Acute severe asthma (HCC)    Asthma (HCC)    Asthmatic bronchitis (HCC)    BMI 38.0-38.9,adult    Chronic interstitial cystitis    Symptoms since 8 years old but not formally diagnosed until  adulthood.    Colon polyp    Complicated migraine    with visual aura, verbal & balance/coordination issues.    Dizziness    Dysphagia    E-coli UTI    Eczema    Ganglion of flexor tendon sheath of right index finger    GERD (gastroesophageal reflux disease)    Gestational hypertension (HCC)    noted at admission at 38+ wk for non reassuring  testing being done for obesity.    Gross hematuria    urine culture >100k E.coli    History of pelvic ultrasound    Pelvic US unremarkable    Infertility management    Clomid & IUI to conceive  pregnancy    Lightheadedness    Migraine with aura    Morbid obesity with BMI of 40.0-44.9, adult (HCC)    Numbness and tingling    Rheum visit 23 - clinical picture, imaging, lab data not consistent with known rheum disorders. lower extremity numbness and tingling, you could consider nerve conduction studies or special testing for small fiber neuropathy (though admittedly, this would not explain vertigo, visual snow or migraine symptoms).    Pap smear for cervical cancer screening    Pap & HPV negative 23 - Peggy Melgar. Per [facility]    Prediabetes    10/4/23 A1c 6.4%    Sciatica    Vertigo    mal de debarquement syndrome. MDDS - frontal lobe communicates incorrectly to vestibular system. Feels likes she is moving when she is not. Went to PT. Helped. Valium helps to stop the symptoms.    Visual disturbance    Visual snow syndrome    reports this developed while on Nexplanon. fuzzy dots in front of her 20/20 vision all the time. Has a fog & some \"purple marks\" in her vision as well. Negative image for up to 5 minutes at its worst. Can get black spots/moving spots.       SURGICAL HISTORY:  Past Surgical History:   Procedure Laterality Date    Appendectomy  10/11/2014    Colonoscopy & polypectomy  2019    Does have polyps. Was told every 3 years    Cystourethroscopy  10/18/2023    Cystoscopy Dr. Ko Flores for gross hematuria. Unremarkable.    Egd   2019    esophagus inflamed. Was having dysphagia & GERD.    Hysterosalpingogram - external referral  11/09/2023    normal cavity. By IVF doctor Qu[facility] Walker    Insert contraceptive capsul  2012    Nexplanon    Knee surgery Left 01/2019    Laparoscopic appendectomy  10/11/2014    not ruptured    Laparoscopic cholecystectomy  2012    +Stones & sludge    Other surgical history Right 08/09/2023    right ringer finger cyst removal    Removal of contraceptive capsul  2014    H/o \"severe allergic reaction\" to birth control (Nexplanon) which consisted of severe migraines, visual issues. Reports this episode occurred after one year with nexplanon. Initially improved after removal of nexplanon implant, but then reoccurred with subsequent menstrual cycle. States these symptoms significantly improved with nuva ring and cessation of menstrual cycles    Removal of ovarian cyst(s) Right 10/11/2014    done at same time of appendectomy. Maybe just functional cyst    Sinus surgery    2019    deviated septum    Spinal puncture,lumbar,diagnostic  2014    She had an LP in 2014 which, per report, showed the following:-- CSF May 2014. Glc 51, protein 34, WBC 0, RBC 6, VDRL negative, OCB negative, opening pressure 14.5 cm in lateral position.    Tonsillectomy  1995       SOCIAL HISTORY:  Social History     Socioeconomic History    Marital status:      Spouse name: Not on file    Number of children: Not on file    Years of education: Not on file    Highest education level: Not on file   Occupational History    Not on file   Tobacco Use    Smoking status: Never     Passive exposure: Never    Smokeless tobacco: Never   Vaping Use    Vaping status: Never Used   Substance and Sexual Activity    Alcohol use: Yes     Comment: rarely    Drug use: Never    Sexual activity: Yes     Partners: Male   Other Topics Concern    Not on file   Social History Narrative    Not on file     Social Determinants of Health     Financial Resource Strain: Low  Risk  (9/13/2024)    Financial Resource Strain     Difficulty of Paying Living Expenses: Not very hard     Med Affordability: No   Food Insecurity: No Food Insecurity (9/13/2024)    Food Insecurity     Food Insecurity: Never true   Transportation Needs: No Transportation Needs (9/13/2024)    Transportation Needs     Lack of Transportation: No     Car Seat: Yes   Stress: No Stress Concern Present (9/13/2024)    Stress     Feeling of Stress : No   Housing Stability: Low Risk  (9/13/2024)    Housing Stability     Housing Instability: No     Housing Instability Emergency: Not on file     Crib or Bassinette: Yes       FAMILY HISTORY:  Family History   Problem Relation Age of Onset    Colon Polyps Father     Infertility Mother         Took Clomid. Short luteal phase    Breast Cancer Maternal Grandmother 73    Prostate Cancer Maternal Grandfather         dx around 50 or maybe younger    Thyroid disease Paternal Grandmother     Other (RA) Paternal Grandmother     No Known Problems Paternal Grandfather     Infertility Sister         Unexplained infertility. IVF for 1st & then conceived spontaneously    Other (genetic carrier) Sister         3 different mutations: CF, hereditary fructose intolerance, ARSACS carrier    Ovarian Cancer Neg     Colon Cancer Neg     Diabetes Neg     Birth Defects Neg     Genetic Disease Neg     Endometriosis Neg     Autoimmune disease Neg     Uterine Cancer Neg     Pancreatic Cancer Neg        MEDICATIONS:    Current Outpatient Medications:     ibuprofen 600 MG Oral Tab, Take 1 tablet (600 mg total) by mouth every 6 (six) hours as needed for Pain., Disp: 30 tablet, Rfl: 0    lidocaine-prilocaine 2.5-2.5 % External Cream, Apply topically as needed., Disp: , Rfl:     ferrous sulfate 325 (65 FE) MG Oral Tab EC, Take 1 tablet (325 mg total) by mouth every other day., Disp: , Rfl:     levothyroxine 50 MCG Oral Tab, Take 1 tablet (50 mcg total) by mouth daily. Overdue for labs including recheck of TSH.,  Disp: 90 tablet, Rfl: 0    cetirizine 10 MG Oral Tab, Take 1 tablet (10 mg total) by mouth daily., Disp: , Rfl:     Methylcobalamin (B12-ACTIVE OR), Take by mouth., Disp: , Rfl:     omeprazole 20 MG Oral Capsule Delayed Release, Take 1 capsule (20 mg total) by mouth daily., Disp: 90 capsule, Rfl: 3    albuterol (5 MG/ML) 0.5% Inhalation Nebu Soln, Take 0.5 mL (2.5 mg total) by nebulization every 6 (six) hours as needed., Disp: , Rfl:     albuterol (2.5 MG/3ML) 0.083% Inhalation Nebu Soln, Take 3 mL (2.5 mg total) by nebulization every 6 (six) hours as needed., Disp: , Rfl:     montelukast 10 MG Oral Tab, Take 1 tablet (10 mg total) by mouth nightly., Disp: , Rfl:     prenatal vitamin with DHA 27-0.8-228 MG Oral Cap, Take 1 capsule by mouth daily., Disp: , Rfl:     Cholecalciferol (VITAMIN D-3) 25 MCG (1000 UT) Oral Cap, Take 6,000 Int'l Units by mouth daily., Disp: , Rfl:     ALLERGIES:    Allergies   Allergen Reactions    Cephalexin SWELLING    Mold WHEEZING    Molds & Smuts OTHER (SEE COMMENTS) and WHEEZING    Nexplanon [Etonogestrel] OTHER (SEE COMMENTS)     Ovarian cysts. Reports had severe migraine & visual issues.    Hydrocodone NAUSEA ONLY and DIZZINESS    Kdc:Acetaminophen+Sorbitan+Propoxyphene DIZZINESS and NAUSEA AND VOMITING    [place] UNKNOWN    Pollen OTHER (SEE COMMENTS)    Adhesive Tape RASH     Allergic to paper tape only    Topiramate RASH     Red blotches on legs         PHYSICAL EXAM:   BP in clinic 131/80, repeat 115/62. BP at home 11teens/70's     Denies headache, blurred vision, or epigastric pain.     Assessment & Plan:    1. Hypertension affecting pregnancy, delivered, current hospitalization (HCC)  -if having preE symptoms, check BP. If BP >140/90 - call the office

## 2024-10-24 DIAGNOSIS — E07.9 THYROID DYSFUNCTION IN PREGNANCY IN FIRST TRIMESTER (HCC): ICD-10-CM

## 2024-10-24 DIAGNOSIS — O99.281 THYROID DYSFUNCTION IN PREGNANCY IN FIRST TRIMESTER (HCC): ICD-10-CM

## 2024-10-24 NOTE — PROGRESS NOTES
[facility]  Obstetrics and Gynecology   Postpartum Progress Note    CC:   Chief Complaint   Patient presents with    Postpartum Care     2024 C-Sec, BOY  - Breast feeding  - No intercourse  - Declines birth control        Subjective:     Mary Huerta is a 34 year old  female - postpartum visit with right breast complaint.    Patient's last menstrual period was 2023.   Chaperone declines      24 - PLTCS viable male \"Cabrera Bermudez\" at 38w5d per Dr. Deann Kidd - for fetal intolerance to labor during IOL for prolonged decleration on NST in the office being done for obesity.     Pregnancy significant for obesity, IUI/Clomid conception, partner with 47, XYY karyotype (Sawyer Syndrome), h/o prediabetes, subclinical hypothyroidism, GERD on PPI, asthma & allergies, eczema, Interstitial cystitis, dyspareunia, pelvic floor dysfunction. 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 Interstitial cystitis. Pelvic floor dysfunction, dyspareunia, h/o severe dysmenorrhea & menorrhagia. Diagnosed with gestational hypertension without severe features during her L&D admission. PIH labs wnl. Acute blood loss anemia s/p IV iron. Received Lasix for edema. Discharged without oral BP meds.     24 Maame Ordoñez APRN - postpartum BP check. BP in clinic 131/80, repeat 115/62. BP at home 11teens/70's     10/21/24 Patient wrote in my Chart message - skin lesion right lateral breast 1.5 inches from areola since about 20 wk gestation & says was told it was hormone related. Increased in size slightly after giving birth. Became red after she wore a bra to bed a few nights prior. She sent a photo of the lesion through [email or web address].     As of today, 10/25/2024 Here for postpartum exam & c/o  breast lesion, right side    #Right breast skin lesion  -says she noticed the lesion at around 20 wk gestation - looked like almost a blister. No discharge. Has squeezed it multiple times to make sure no discharge.   -was stable for almost the whole pregnancy   -lesion was not examined during the pregnancy   -reports was told it was probably hormonal while in the hospital by the RNs  -lesion slightly enlarged just before delivery (about 1 week before)   -postpartum it has gotten a little more red only when she was feeding more on that side.   -has a scab there  -no breast lump   -no warmth of the area     Baby boy doing well - some colic. Growing ok.   Breastfeeding. Supply is good.   Left breast is ok     Last week on Thursday- had fever to 100.6 while pain medications. For 1 day   Did have some nasal dripping.   No one else was sick at that time but a few weeks prior to that everyone in the family was sick with cold & flu symptoms.     Lochia - stopped & then started back up brown after the fever on Sat or . Just last night to today was a little red.   Period since delivery? No   Plans for kids - eventually.   Contraception plan - condoms. Trying to avoid hormonal contraception for now, but would do NuvaRing if needed.      Pain - N  BM N  Urination N  Leaking urine? Random leaking at times - if has been holding it too long. Overall ok.   Mood good.   [place] N    -Partner Sean Huerta   10/27/1989   He is 47, XYY (Sawyer Syndrome) - he is 7 ft tall.   Did see a genetic counselor   Saw Dr. Ho (urologist) - says testosterone normal   23 SA done through Dr. Alvaro Nelson's office was low morphology & progressive motility, lower volume.   Carrier screen - done for him one rare condition.   FHx of genetic diseases or birth defects - no     Patient Care Team:  Yin Naylor as PCP - General (Legal Medicine)  Ko Flores MD (UROLOGY)  Cassie Garrido PT as Physical Therapist   OB/GYN  Peggy Melgar & Mirella Schaffer M.D.    [facility]   Urologist Dr. Ko Flores - interstitial cystitis  Neuro Aidan Falcon   Ophthalmologist Thai Thompson     Review of Systems   HENT:          Severe snow vision - fuzzy dots in front of her 20/20 vision all the time. Has a fog & some \"purple marks\" in her vision as well. Negative image for up to 5 minutes at its worst. Can get black spots/moving spots. Neuro-ophthalmologist says her \"filter\" doesn't work in the brain. Was told she could try a seizure medication & may help 30-40% of the time.     Occasional pain behind her eyes not associated with headaches. Feels like she was \"punched\" in the eye. She has seen ophthalmology but has never been diagnosed with uveitis or an inflammatory eye condition to her knowledge.      Occasional pain in her left ear without chondritis.      She reports a previous diagnosis of left sided hearing loss, but not sure if this is high or low frequency and does not have the records at this time.      She also reports chronic sinus issues and had a sinus surgery in 2019.      No sicca    Respiratory:          Asthma - pretty good. Worse in neff. Had some steroids before the wedding. Also had bronchitis after the wedding.    Gastrointestinal:         H/o anal fissures or hemorrhoid flares.   Food allergies will cause some heartburn.    Lots of food allergies/sensitivities - sticking to this really helps   Genitourinary:         Interstitial cystitis  -Has had issues since 8 years old and they didn't know. They kept telling her she had UTIs. Worsening with age. Would always have lower abdominal pain & vaginal/vulvar pain. Can make it hard to orgasm. Dx 1/2022 - Dr. Flores. Renal US was ok. Another cystoscopy is planned. Has been having blood in the urine on 3 separate occasions. Taking oral aloe vera - since 2/2022. Helps a lot. Brisk walking helps with pain. No bladder instillations at this point.   -worsening symptoms:  suprapubic pain, frequency, urgency  -Sometimes blood in urine - 3 episodes of this in the recent past     +Pelvic floor pain is frequent. Has not done pelvic floor PT yet.   Chronic suprapubic pain      +Long cycles. Not having much cramping with them anymore (+severe cramping & were very heavy as a teen.)      As of 2/13/23 GYN visit noted continuous NuvaRing. +Vaginal bleeding with intercourse most of the time & some pain with intercourse shooting into left leg as well. Thinks it may be bleeding from the IC. Pelvic US & pelvic floor PT ordered.     Musculoskeletal:  Positive for myalgias and joint pain.   Skin:         Breasts - random infrequent sharp jolt in right breast - for many years.      Right nipple discharge about 10 years ago - cloudy/clear      Frequent rashes.   Acne - rare on the chin  Hirsutism - one random hair out of the chin.      Eczema on hands   Allergic/Immunologic: Positive for environmental allergies and food allergies.   Neurological:              Brain MRI 11/2022 - no acute issues but \"dilated vestibular aqueducts bilaterally\"     In June 2022, she saw a Rheumatologist and had multiple blood tests. Her ESR was slightly elevated to 30 (20 was ULN). CRP was elevated to 20.9 (10 was ULN). She had a negative RF and CCP. C3 was WNL. C4 was slightly elevated to 50. She had a normal CK. She had a negative dsDNA, negative SM, RNP, negative SSA and negative Shanna-1 antibody.      In 2021, patient had additional labs at [facility] which showed a normal CMP including LFTs, renal function, normal CBC with normal differential, normal TSH.     Saw Rheum 1/20/23 with c/o numbness & tingling.   -Constant tingling in the left foot and left leg that she associates with a previous injury (fell off a horse >10 years ago). Also reports occasional tingling of the left hand. She does not drop objects in the left hand but states she occasionally feels \"weakness\" in this hand.      Per note:  Your  clinical picture, imaging and laboratory data does not fit any of our Rheumatologic diseases including more common diseases such as Rheumatoid Arthritis, Lupus, Sjogren's Disease, or Sarcoid, or more rare diagnoses including relapsing polychondritis, vasculitis or Evelyn syndrome.      The ENT records about hearing loss may be helpful. There are certain types of hearing loss associated with autoimmune disease (conductive, low frequency).      We would recommend continued follow up with eye and neurology teams.     For the lower extremity numbness and tingling, you could consider nerve conduction studies or special testing for small fiber neuropathy (though admittedly, this would not explain vertigo, visual snow or migraine symptoms).      Psychiatric/Behavioral:          Sleep - overall good. Can be hit or miss. Usually 7-10 hr.   Some snoring or noises  Sleep study - no      GYN Hx  Menarche age 13.   Prior to hormonal contraception:   Menses regular q 30 days x very painful x very heavy x 5-7 d  +Doubled over as a teen. +Absenteeism. Naproxen did not help much.      At 18 - OCP - no issues on that - With college hard to remember to take daily     2012 - age 22 - Nexplanon inserted - had it for about 1 year before she started having odd symptoms: vision changes (severe Snow Vision), every 2 months would have crying & hysterical mood for 1 day.      Was diagnosed with 4 underlying conditions that seemed to be woken up due to the Nexplanon  -Migraine, complex with aura   -Vertigo - MDDS  -Severe snow vision   -Interstitial cystitis    2/2023 While on continuous NuvaRing having bleeding & pain with sex. Outside provider note indicates pelvic exam was unremarkable & patient thinks it is due to IC. Pelvic US & pelvic floor PT ordered      Nexplanon was removed in 2014  Shortly after that 10/2014 - laparoscopic appendectomy & removal of right ovarian cyst (not sure of etiology, sounds like it was ovulatory dysfunction)      Was told she may be allergic to her own progesterone.   Placed on NuvaRing continuously     [facility]   23 OB/GYN VISIT - Peggy Stinson M.D.  [facility] Obstetrics & Gynecology  Annual Exam Pt states she wants to start trying to get pregnant. Pt states she has IC that's gets worse after stopping BC. Pt has snow vision and loses the ability to speak after stopping BC   Per note: \" Is currently using NuvaRing continuously because she has mood changes (crying) and worsening vision changes and speech difficulty when she has menstrual cycles. Patient has h/o snow vision. Follows with neuro-ophthalmology and was diagnosed with migraines with multiple complex visual symptoms.  Has vaginal bleeding after intercourse most of the time. Thinks that this is likely related to interstitial cystitis.   pain with intercourse - has shooting pain in left pelvis that goes left leg.   -BMI 37.2   Per note: Pelvic US ordered. Pelvic floor PT ordered. Carrier screening sent out. PNV - already taking.      Stopped the NuvaRing early 2023 to try to conceive.      Cycles 32-33 days x 3-4 days x heavier but not bad x minimal cramping   OPK - LH surge testing - peak on cycle day 22-24      H/o Dyspareunia? sometimes  H/o Postcoital bleeding? Sometimes      STDs: no   HPV vaccine done      Cervical cancer screening:   No abn pap   Pap & HPV negative 23     Breast cancer screening:  Mammogram: N     Colon cancer screening:  Colonoscopy  +colon polyp. Was told recall 3 years (due around )     OB History    Para Term  AB Living   3 1 1   2 1   SAB IAB Ectopic Multiple Live Births   2     0 1      # Outcome Date GA Lbr Mik/2nd Weight Sex Type Anes PTL Lv   3 Term 24 38w5d  7 lb 6.5 oz (3.36 kg) M Caesarean EPI N DENNY      Complications: Variable decelerations   2 SAB 2023 4w0d    SAB   FD      Birth Comments: Chemical pregnancy right after coming off NuvaRing   1 SAB                Obstetric Comments   2023 - Chemical SAB. Dating uncertain. Conceived right after stopping NuvaRing. Had a lot of bleeding though. Very faint pregnancy test.       24 - boy \"Cabrera\" - Clomid & IUI per [facility] Walker (ovulatory dysfunction). Partner with 47, XYY karyotype (Sawyer Syndrome) - was still able to use his sperm. Obesity, h/o prediabetes, subclinical hypothyroidism, GERD on PPI, asthma & allergies, eczema, Interstitial cystitis, dyspareunia, pelvic floor dysfunction. 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 IOL for prolonged deceleration on routine NST in office. BP elevated on admission to L&D ->met criteria for gestational hypertension. PLTCS for fetal intolerance to labor.      Past Medical History:   Diagnosis Date    Abdominal pain 2016    Acute severe asthma (HCC) 2017    Asthma (HCC) 2023    Asthmatic bronchitis (HCC) 2022    BMI 38.0-38.9,adult     Chronic interstitial cystitis 2022    Symptoms since 8 years old but not formally diagnosed until adulthood.    Colon polyp     Complicated migraine     with visual aura, verbal & balance/coordination issues.    Dizziness 2023    Dysphagia     E-coli UTI 2023    Eczema 2021    Ganglion of flexor tendon sheath of right index finger 2023    GERD (gastroesophageal reflux disease)     Gestational hypertension (HCC) 2024    noted at admission at 38+ wk for non reassuring  testing being done for obesity.    Gross hematuria 2023    urine culture >100k E.coli    History of pelvic ultrasound 10/16/2023    Pelvic US unremarkable    Infertility management     Clomid & IUI to conceive  pregnancy    Lightheadedness 2023    Migraine with aura     Morbid  obesity with BMI of 40.0-44.9, adult ([facility]) 09/26/2023    Numbness and tingling 01/20/2023    Rheum visit 1/20/23 - clinical picture, imaging, lab data not consistent with known rheum disorders. lower extremity numbness and tingling, you could consider nerve conduction studies or special testing for small fiber neuropathy (though admittedly, this would not explain vertigo, visual snow or migraine symptoms).    Pap smear for cervical cancer screening 02/13/2023    Pap & HPV negative 2/13/23 - Peggy Melgar. Per Care[facility]where    Prediabetes 10/04/2023    10/4/23 A1c 6.4%    Sciatica     Vertigo     mal de debarquement syndrome. MDDS - frontal lobe communicates incorrectly to vestibular system. Feels likes she is moving when she is not. Went to PT. Helped. Valium helps to stop the symptoms.    Visual disturbance 11/01/2022    Visual snow syndrome     reports this developed while on Nexplanon. fuzzy dots in front of her 20/20 vision all the time. Has a fog & some \"purple marks\" in her vision as well. Negative image for up to 5 minutes at its worst. Can get black spots/moving spots.      Past Surgical History:   Procedure Laterality Date    Appendectomy  10/11/2014    Colonoscopy & polypectomy  2019    Does have polyps. Was told every 3 years    Cystourethroscopy  10/18/2023    Cystoscopy Dr. Ko Flores for gross hematuria. Unremarkable.    Egd  2019    esophagus inflamed. Was having dysphagia & GERD.    Hysterosalpingogram - external referral  11/09/2023    normal cavity. By IVF doctor Quang Walker    Insert contraceptive capsul  2012    Nexplanon    Knee surgery Left 01/2019    Laparoscopic appendectomy  10/11/2014    not ruptured    Laparoscopic cholecystectomy  2012    +Stones & sludge    Other surgical history Right 08/09/2023    right ringer finger cyst removal    Removal of contraceptive capsul  2014    H/o \"severe allergic reaction\" to birth control (Nexplanon) which consisted of severe migraines, visual  issues. Reports this episode occurred after one year with nexplanon. Initially improved after removal of nexplanon implant, but then reoccurred with subsequent menstrual cycle. States these symptoms significantly improved with nuva ring and cessation of menstrual cycles    Removal of ovarian cyst(s) Right 10/11/2014    done at same time of appendectomy. Maybe just functional cyst    Sinus surgery    2019    deviated septum    Spinal puncture,lumbar,diagnostic  2014    She had an LP in 2014 which, per report, showed the following:-- CSF May 2014. Glc 51, protein 34, WBC 0, RBC 6, VDRL negative, OCB negative, opening pressure 14.5 cm in lateral position.    Tonsillectomy  1995       Allergies:  Allergies[1]    Medications:  Current Outpatient Medications   Medication Sig Dispense Refill    amoxicillin clavulanate 875-125 MG Oral Tab Take 1 tablet by mouth 2 (two) times daily for 14 days. 28 tablet 0    ibuprofen 600 MG Oral Tab Take 1 tablet (600 mg total) by mouth every 6 (six) hours as needed for Pain. 30 tablet 0    lidocaine-prilocaine 2.5-2.5 % External Cream Apply topically as needed.      ferrous sulfate 325 (65 FE) MG Oral Tab EC Take 1 tablet (325 mg total) by mouth every other day.      levothyroxine 50 MCG Oral Tab Take 1 tablet (50 mcg total) by mouth daily. Overdue for labs including recheck of TSH. 90 tablet 0    cetirizine 10 MG Oral Tab Take 1 tablet (10 mg total) by mouth daily.      Methylcobalamin (B12-ACTIVE OR) Take by mouth.      omeprazole 20 MG Oral Capsule Delayed Release Take 1 capsule (20 mg total) by mouth daily. 90 capsule 3    albuterol (5 MG/ML) 0.5% Inhalation Nebu Soln Take 0.5 mL (2.5 mg total) by nebulization every 6 (six) hours as needed.      albuterol (2.5 MG/3ML) 0.083% Inhalation Nebu Soln Take 3 mL (2.5 mg total) by nebulization every 6 (six) hours as needed.      montelukast 10 MG Oral Tab Take 1 tablet (10 mg total) by mouth nightly.      prenatal vitamin with DHA 27-0.8-228  MG Oral Cap Take 1 capsule by mouth daily.      Cholecalciferol (VITAMIN D-3) 25 MCG (1000 UT) Oral Cap Take 6,000 Int'l Units by mouth daily.          Social History     Socioeconomic History    Marital status:    Tobacco Use    Smoking status: Never     Passive exposure: Never    Smokeless tobacco: Never   Vaping Use    Vaping status: Never Used   Substance and Sexual Activity    Alcohol use: Yes     Comment: rarely    Drug use: Never    Sexual activity: Yes     Partners: Male     Social Drivers of Health     Financial Resource Strain: Low Risk  (9/13/2024)    Financial Resource Strain     Difficulty of Paying Living Expenses: Not very hard     Med Affordability: No   Food Insecurity: No Food Insecurity (9/13/2024)    Food Insecurity     Food Insecurity: Never true   Transportation Needs: No Transportation Needs (9/13/2024)    Transportation Needs     Lack of Transportation: No     Car Seat: Yes   Stress: No Stress Concern Present (9/13/2024)    Stress     Feeling of Stress : No   Housing Stability: Low Risk  (9/13/2024)    Housing Stability     Housing Instability: No     Crib or Bassinette: Yes        Family History   Problem Relation Age of Onset    Colon Polyps Father     Infertility Mother         Took Clomid. Short luteal phase    Breast Cancer Maternal Grandmother 73    Prostate Cancer Maternal Grandfather         dx around 50 or maybe younger    Thyroid disease Paternal Grandmother     Other (RA) Paternal Grandmother     No Known Problems Paternal Grandfather     Infertility Sister         Unexplained infertility. IVF for 1st & then conceived spontaneously    Other (genetic carrier) Sister         3 different mutations: CF, hereditary fructose intolerance, ARSACS carrier    Ovarian Cancer Neg     Colon Cancer Neg     Diabetes Neg     Birth Defects Neg     Genetic Disease Neg     Endometriosis Neg     Autoimmune disease Neg     Uterine Cancer Neg     Pancreatic Cancer Neg        Depression Scale  Total: 0 (10/25/2024 12:50 PM)      Depression Screening (PHQ-2/PHQ-9): Over the LAST 2 WEEKS                        Objective:     Vitals:    10/25/24 1244   BP: 126/66   Pulse: 69   Weight: 267 lb (121.1 kg)   Height: 69\"         Body mass index is 39.43 kg/m².  Physical Exam  Vitals and nursing note reviewed.   Constitutional:       Appearance: Normal appearance. She is obese.   HENT:      Head: Normocephalic and atraumatic.   Eyes:      Extraocular Movements: Extraocular movements intact.      Conjunctiva/sclera: Conjunctivae normal.   Neck:      Comments: No thyromegaly  Cardiovascular:      Rate and Rhythm: Normal rate and regular rhythm.      Heart sounds: No murmur heard.  Pulmonary:      Effort: Pulmonary effort is normal.      Breath sounds: Normal breath sounds.      Comments: Right breast  -non-inflamed appearing slightly scabbed over lesion about 3-4 mm in size.   -no masses, no axillary lymphadenopathy  -no skin dimpling/retraction  -no nipple cracking, nipple everted    Left breast  -no masses, no axillary lymphadenopathy  -no skin dimpling/retraction  -no nipple cracking, nipple everted  Abdominal:      General: There is no distension.      Palpations: Abdomen is soft. There is no mass.      Tenderness: There is no abdominal tenderness. There is no guarding or rebound.      Hernia: No hernia is present.      Comments: Obese. Pfannenstiel incision healing well.    Genitourinary:     Comments: VULVA: no lesions   URETHRA: unremarkable   PERINEUM: intact  VAGINA: moderate brown tinged blood   CERVIX: normal appearing cervix   UTERUS: uterus is normal size, shape, consistency and nontender  ADNEXA: normal adnexa in size, nontender and no masses  PELVIC FLOOR: mild hypertonicity, no tenderness       Musculoskeletal:      Right lower leg: No edema.      Left lower leg: No edema.   Neurological:      General: No focal deficit present.      Mental Status: She is alert.   Psychiatric:         Mood and Affect:  Mood normal.         Behavior: Behavior normal.         Thought Content: Thought content normal.         Judgment: Judgment normal.           Labs:         Assessment:     Mary Huerta is a 34 year old  female - postpartum visit with skin lesion of right breast that has been there since around 20 wk gestation per patient, slightly enlarged just prior to delivery, recently got a little red, now not red or warm but with scab like area. No bleeding or drainage. No breast lump. Also with some mainly brown vaginal bleeding over the past 1 week - not sure if a period or not.     Diagnoses and all orders for this visit:    Encounter for postpartum visit (HCC)    Abnormal uterine bleeding, postpartum ([facility])  -     Urine Preg Test [91753]  -     amoxicillin clavulanate 875-125 MG Oral Tab; Take 1 tablet by mouth 2 (two) times daily for 14 days.  -     US OB Transvaginal (any trimester) [16901]; Future  -     US PELVIS W EV (CPT=76856/22604); Future    Skin lesion of breast  -     Surgery Referral - In Network  -     [facility] MELVIN 2D+3D DIAGNOSTIC DANIEL  BILAT (CPT=77066/84020); Future    Lactating mother (HCC)  -     Surgery Referral - In Network  -     [facility] MELVIN 2D+3D DIAGNOSTIC DANIEL  BILAT (CPT=77066/43143); Future    General counseling and advice for contraceptive management    Family history of colonic polyps    Pelvic floor dysfunction  -     OP REFERRAL TO [facility]    Chronic interstitial cystitis  -     OP REFERRAL TO [facility]    Dyspareunia, female  -     OP REFERRAL TO [facility]    Status post primary low transverse  section    Obesity (BMI 30-39.9)    History of gestational hypertension    Postpartum state (HCC)  -     OP REFERRAL TO [facility]    Screening, anemia, deficiency, iron  -     CBC W Differential W Platelet; Future    Screening for diabetes mellitus  -     Comp Metabolic Panel (14); Future  -      Hemoglobin A1C; Future  -     TSH W Reflex To Free T4; Future    Screening for thyroid disorder    Screening for lipid disorders  -     Lipid Panel; Future         Plan:     Skin lesion right breast   -photo patient uploaded in [place] from 10/21/24 looked almost like a postinflammatory hyperpigmented area with surrounding francisca of erythematous skin with dilated & prominent capillaries/tiny vessels  -10/25/2024 exam with Right breast non-inflamed appearing slightly scabbed over lesion about 3-4 mm in size. No lesions or axillary LAD  -discussed could just be a skin lesion but inflammatory breast cancer can present like this  -recommend see gen surg  -diagnostic mammogram ordered     Postpartum AUB  -urine preg NEG 10/25/2024   -10/25/2024 blood on exam - brown, moderate, looks like could be a period.   -Ddx period vs endometritis vs retained products vs less likely GTN  -Rx augmentin BID x 14 days  -Pelvic US to look at endometrial stripe    S/p PLTCS  -indication was fetal intolerance to labor  -could be a candidate for TOLAC in future   -incision healing well      GHTN without severe features  -increased risk chronic HTN & pre-eclampsia  -baseline labs in future pregnancies   -aspirin in future pregnancies     Obesity   -weight loss clinic - declines referral    Hx Prediabetes   -10/4/23  A1c 6.3% pre pregnancy -> prescribed metformin 500 mg daily   -conceived while on Metformin 500 mg ER daily - stopped after 1st OB visit     H/o IUI & Clomid   -ovulatory dysfunction  -partner Sean with 47, XYY karyotype (Sawyer Syndrome) - was still able to use his sperm     Asthma & allergies, eczema   -multiple allergies of unknown etiology which manifest as tongue swelling, lip swelling and face swelling. Also gets heart palpitations and GI distress associated with multiple foods   -stopped Advair with positive pregnancy test -> Rx Fluticasone inhaler   -Singular daily, Zyrtec, albuterol PRN     Subclinical  hypothyroidism  -10/4/23 TSH 3.7 (pre-conception) -> Rx levothyroxine 25 mcg   -stopped levothyroxine per [facility] & had additional elevated levels (12/13/23 TSH 4.56 & 1/16/24 5.95)   -SUMMER restarted levothyroxine 50 mcg daily (increased from 25 mcg dose)   -3/20 TSH 1.21   -4/23 TSH 1.2 - 2nd trim  -7/24/24 1.19 - 3rd trim  -10/25/24 still taking levothyroxine. Check TSH    Interstitial cystitis, dyspareunia, pelvic floor dysfunction  -Urologist Dr. 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 Has a fog & some \"purple marks\" in her vision as well. Negative image for up to 5 minutes at its worst. Can get black spots/moving spots. -Neuro-ophthalmologist says her \"filter\" doesn't work in the brain. Was told she could try a seizure medication & may help 30-40% of the time.      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      Contraception  -h/o complicated migraine/aura. No estrogen.   -plans - condoms. May try to conceive again soon - in about 6 months or so. (Will be AMA & had IUI & Clomid, metformin to conceive)     Continue PNV advised   HPV vaccine done   Carrier done with Dr. Walker - [facility] panel negative results      Fhx colon polyp (2019)   -patient  aware she is due for colonoscopy    RTC Well woman exam in about 6 months     Danita Diaz MD  [facility] - OBGYN              [1]   Allergies  Allergen Reactions    Cephalexin SWELLING    Mold WHEEZING    Molds & Smuts OTHER (SEE COMMENTS) and WHEEZING    Nexplanon [Etonogestrel] OTHER (SEE COMMENTS)     Ovarian cysts. Reports had severe migraine & visual issues.    Hydrocodone NAUSEA ONLY and DIZZINESS    Kdc:Acetaminophen+Sorbitan+Propoxyphene DIZZINESS and NAUSEA AND VOMITING    [place] UNKNOWN    Pollen OTHER (SEE COMMENTS)    Adhesive Tape RASH     Allergic to paper tape only    Topiramate RASH     Red blotches on legs

## 2024-10-25 ENCOUNTER — POSTPARTUM (OUTPATIENT)
Facility: CLINIC | Age: 34
End: 2024-10-25
Payer: COMMERCIAL

## 2024-10-25 VITALS
WEIGHT: 267 LBS | BODY MASS INDEX: 39.55 KG/M2 | HEART RATE: 69 BPM | HEIGHT: 69 IN | SYSTOLIC BLOOD PRESSURE: 126 MMHG | DIASTOLIC BLOOD PRESSURE: 66 MMHG

## 2024-10-25 DIAGNOSIS — E66.9 OBESITY (BMI 30-39.9): ICD-10-CM

## 2024-10-25 DIAGNOSIS — Z83.719 FAMILY HISTORY OF COLONIC POLYPS: ICD-10-CM

## 2024-10-25 DIAGNOSIS — Z13.220 SCREENING FOR LIPID DISORDERS: ICD-10-CM

## 2024-10-25 DIAGNOSIS — Z13.1 SCREENING FOR DIABETES MELLITUS: ICD-10-CM

## 2024-10-25 DIAGNOSIS — Z87.59 HISTORY OF GESTATIONAL HYPERTENSION: ICD-10-CM

## 2024-10-25 DIAGNOSIS — Z13.29 SCREENING FOR THYROID DISORDER: ICD-10-CM

## 2024-10-25 DIAGNOSIS — N30.10 CHRONIC INTERSTITIAL CYSTITIS: ICD-10-CM

## 2024-10-25 DIAGNOSIS — Z13.0 SCREENING, ANEMIA, DEFICIENCY, IRON: ICD-10-CM

## 2024-10-25 DIAGNOSIS — Z98.891 STATUS POST PRIMARY LOW TRANSVERSE CESAREAN SECTION: ICD-10-CM

## 2024-10-25 DIAGNOSIS — N94.10 DYSPAREUNIA, FEMALE: ICD-10-CM

## 2024-10-25 DIAGNOSIS — M62.89 PELVIC FLOOR DYSFUNCTION: ICD-10-CM

## 2024-10-25 DIAGNOSIS — Z30.09 GENERAL COUNSELING AND ADVICE FOR CONTRACEPTIVE MANAGEMENT: ICD-10-CM

## 2024-10-25 DIAGNOSIS — L98.8 SKIN LESION OF BREAST: ICD-10-CM

## 2024-10-25 PROCEDURE — 99213 OFFICE O/P EST LOW 20 MIN: CPT | Performed by: OBSTETRICS & GYNECOLOGY

## 2024-10-25 PROCEDURE — 3074F SYST BP LT 130 MM HG: CPT | Performed by: OBSTETRICS & GYNECOLOGY

## 2024-10-25 PROCEDURE — 3078F DIAST BP <80 MM HG: CPT | Performed by: OBSTETRICS & GYNECOLOGY

## 2024-10-25 PROCEDURE — 3008F BODY MASS INDEX DOCD: CPT | Performed by: OBSTETRICS & GYNECOLOGY

## 2024-10-25 PROCEDURE — 81025 URINE PREGNANCY TEST: CPT | Performed by: OBSTETRICS & GYNECOLOGY

## 2024-10-25 RX ORDER — LEVOTHYROXINE SODIUM 50 UG/1
50 TABLET ORAL DAILY
Qty: 90 TABLET | Refills: 0 | Status: SHIPPED | OUTPATIENT
Start: 2024-10-25

## 2024-10-25 NOTE — PATIENT INSTRUCTIONS
[facility] General Surgery  Sharon Barnard, Zeina, Valentino, Gómez, Khurrma, Miguelina*, Joy   *fellowship in colorectal surgery   Office location is a short 10-minute drive from the [facility] office - just off [address] between [address] and [facility].   New Location:  [facility]  General Surgery  [address] [place] [address]  [phone]       [facility]    [address] [place] [address], [address], [place] [address]. Ph: [phone]  [address], [address]nd [place] [address]. Ph: [phone] & [phone]  [address] [facility], [place] [address]. Ph [phone]  [address], [place] [address]. Ph [phone]  [address] [place] [address]. Ph [phone]  [address] [place] [address]. Ph [phone]       [facility]    [place]  [address] [place] [address], [address]  Ph [phone]    [place]  [address] [facility]  Ph [phone]    [place]  [address], [address]  Ph [phone]    [place]  [address], [address]  Ph [phone]    [place]  [address] [place] [address]  Ph [phone]

## 2024-10-28 ENCOUNTER — TELEPHONE (OUTPATIENT)
Facility: CLINIC | Age: 34
End: 2024-10-28

## 2024-10-29 ENCOUNTER — APPOINTMENT (OUTPATIENT)
Dept: PHYSICAL THERAPY | Age: 34
End: 2024-10-29
Attending: OBSTETRICS & GYNECOLOGY
Payer: COMMERCIAL

## 2024-10-30 ENCOUNTER — PATIENT MESSAGE (OUTPATIENT)
Facility: CLINIC | Age: 34
End: 2024-10-30

## 2024-10-30 ENCOUNTER — HOSPITAL ENCOUNTER (OUTPATIENT)
Dept: ULTRASOUND IMAGING | Facility: HOSPITAL | Age: 34
Discharge: HOME | End: 2024-10-30
Attending: OBSTETRICS & GYNECOLOGY
Payer: COMMERCIAL

## 2024-10-30 PROCEDURE — 76830 TRANSVAGINAL US NON-OB: CPT | Performed by: OBSTETRICS & GYNECOLOGY

## 2024-10-30 PROCEDURE — 76856 US EXAM PELVIC COMPLETE: CPT | Performed by: OBSTETRICS & GYNECOLOGY

## 2024-10-31 NOTE — PROGRESS NOTES
Pt aware of results.     How has bleeding been since starting the antibiotics?      Bleeding stopped 2 days ago.    Verbalized understanding.

## 2024-11-05 ENCOUNTER — APPOINTMENT (OUTPATIENT)
Dept: PHYSICAL THERAPY | Age: 34
End: 2024-11-05
Attending: OBSTETRICS & GYNECOLOGY
Payer: COMMERCIAL

## 2024-11-07 ENCOUNTER — HOSPITAL ENCOUNTER (OUTPATIENT)
Dept: MAMMOGRAPHY | Facility: HOSPITAL | Age: 34
Discharge: HOME | End: 2024-11-07
Attending: OBSTETRICS & GYNECOLOGY
Payer: COMMERCIAL

## 2024-11-07 DIAGNOSIS — L98.8 SKIN LESION OF BREAST: ICD-10-CM

## 2024-11-07 PROBLEM — Z98.890 STATUS POST INDUCTION OF LABOR: Status: RESOLVED | Noted: 2024-09-13 | Resolved: 2024-11-07

## 2024-11-07 PROBLEM — O36.8390 FETAL HEART RATE NON-REASSURING AFFECTING MANAGEMENT OF MOTHER (HCC): Status: RESOLVED | Noted: 2024-09-14 | Resolved: 2024-11-07

## 2024-11-07 PROBLEM — O26.899 CARPAL TUNNEL SYNDROME DURING PREGNANCY (HCC): Status: RESOLVED | Noted: 2024-07-08 | Resolved: 2024-11-07

## 2024-11-07 PROBLEM — O26.893 HEARTBURN DURING PREGNANCY IN THIRD TRIMESTER (HCC): Status: RESOLVED | Noted: 2024-03-20 | Resolved: 2024-11-07

## 2024-11-07 PROBLEM — O99.519 MATERNAL ASTHMA COMPLICATING PREGNANCY (HCC): Status: RESOLVED | Noted: 2024-03-20 | Resolved: 2024-11-07

## 2024-11-07 PROBLEM — R12 HEARTBURN DURING PREGNANCY IN THIRD TRIMESTER (HCC): Status: RESOLVED | Noted: 2024-03-20 | Resolved: 2024-11-07

## 2024-11-07 PROBLEM — O99.519 MATERNAL ASTHMA (HCC): Status: RESOLVED | Noted: 2024-08-30 | Resolved: 2024-11-07

## 2024-11-07 PROBLEM — O09.813 PREGNANCY RESULTING FROM ASSISTED REPRODUCTIVE TECHNOLOGY IN THIRD TRIMESTER (HCC): Status: RESOLVED | Noted: 2024-02-23 | Resolved: 2024-11-07

## 2024-11-07 PROBLEM — O99.891 DISORDER OF MUSCULOSKELETAL SYSTEM DURING PREGNANCY (HCC): Status: RESOLVED | Noted: 2024-03-20 | Resolved: 2024-11-07

## 2024-11-07 PROBLEM — O13.3 GESTATIONAL HYPERTENSION, THIRD TRIMESTER (HCC): Status: RESOLVED | Noted: 2024-09-13 | Resolved: 2024-11-07

## 2024-11-07 PROBLEM — D62 ACUTE BLOOD LOSS ANEMIA: Status: RESOLVED | Noted: 2024-09-15 | Resolved: 2024-11-07

## 2024-11-07 PROBLEM — Z3A.38 38 WEEKS GESTATION OF PREGNANCY (HCC): Status: RESOLVED | Noted: 2024-09-13 | Resolved: 2024-11-07

## 2024-11-07 PROBLEM — O21.9 PREGNANCY RELATED NAUSEA AND VOMITING, ANTEPARTUM (HCC): Status: RESOLVED | Noted: 2024-03-20 | Resolved: 2024-11-07

## 2024-11-07 PROBLEM — G56.00 CARPAL TUNNEL SYNDROME DURING PREGNANCY (HCC): Status: RESOLVED | Noted: 2024-07-08 | Resolved: 2024-11-07

## 2024-11-07 PROBLEM — O99.283 THYROID DYSFUNCTION IN PREGNANCY IN THIRD TRIMESTER (HCC): Status: RESOLVED | Noted: 2024-03-20 | Resolved: 2024-11-07

## 2024-11-07 PROBLEM — O28.8 NON-STRESS TEST WITH DECELERATIONS: Status: RESOLVED | Noted: 2024-09-13 | Resolved: 2024-11-07

## 2024-11-07 PROBLEM — Z34.90 PREGNANCY (HCC): Status: RESOLVED | Noted: 2024-09-13 | Resolved: 2024-11-07

## 2024-11-07 PROBLEM — J45.909 MATERNAL ASTHMA (HCC): Status: RESOLVED | Noted: 2024-08-30 | Resolved: 2024-11-07

## 2024-11-07 PROBLEM — O99.213 OBESITY AFFECTING PREGNANCY IN THIRD TRIMESTER (HCC): Status: RESOLVED | Noted: 2024-03-20 | Resolved: 2024-11-07

## 2024-11-07 PROBLEM — J45.909 MATERNAL ASTHMA COMPLICATING PREGNANCY (HCC): Status: RESOLVED | Noted: 2024-03-20 | Resolved: 2024-11-07

## 2024-11-07 PROBLEM — E07.9 THYROID DYSFUNCTION IN PREGNANCY IN THIRD TRIMESTER (HCC): Status: RESOLVED | Noted: 2024-03-20 | Resolved: 2024-11-07

## 2024-11-07 PROBLEM — O09.03 CLOMID PREGNANCY IN THIRD TRIMESTER (HCC): Status: RESOLVED | Noted: 2024-03-20 | Resolved: 2024-11-07

## 2024-11-07 PROCEDURE — 77062 BREAST TOMOSYNTHESIS BI: CPT | Performed by: OBSTETRICS & GYNECOLOGY

## 2024-11-07 PROCEDURE — 76642 ULTRASOUND BREAST LIMITED: CPT | Performed by: OBSTETRICS & GYNECOLOGY

## 2024-11-07 PROCEDURE — 77066 DX MAMMO INCL CAD BI: CPT | Performed by: OBSTETRICS & GYNECOLOGY

## 2024-11-07 NOTE — CONSULTS
Breast Surgery New Patient Consultation    Mary Huerta is a 34 year old patient, referred by Dr. Diaz, a patient of Dr. Naylor, who presents for evaluation of right breast skin lesion.    History of Present Illness:   Ms. Mary Huerta is a 34 year old woman who recently delivered a baby and presents for evaluation of right breast skin lesion associated with pregnancy and postpartum.     She delivered a baby on 9/14/2024 and is currently breastfeeding.    Around 20 weeks of gestation, she noticed a red skin lesion at the lateral right breast. This increased in size over time and, at one point, had some drainage from it. She underwent diagnostic imaging that showed extremely dense breast tissue as well as a 5 x 4 x 2 mm nodule consistent with a sebaceous cyst. Over time her skin lesion had now decreased in size.     She denies any palpable masses, nipple discharge, or axillary adenopathy. She does have breast pain on the left side that is constant. She states it started when her son was arching and having reflux with breastfeeding. She does not have significant past history for breast diseases or breast biopsy. She does have family history of breast cancer. Her maternal grandmother had breast cancer at age 72. Her maternal grandfather had prostate cancer around age 50. She does not have a history of genetic testing for breast cancer genes.          Past Medical History:   • Abdominal pain   • Acute severe asthma (HCC)   • Asthma (HCC)   • Asthmatic bronchitis (HCC)   • Chronic interstitial cystitis    Symptoms since 8 years old but not formally diagnosed until adulthood.   • Colon polyp   • Complicated migraine    with visual aura, verbal & balance/coordination issues.   • Dizziness   • Dysphagia   • E-coli UTI   • Eczema   • Ganglion of flexor tendon sheath of right index finger   • GERD (gastroesophageal reflux disease)   • Gestational hypertension (HCC)    noted at admission at 38+ wk for non  reassuring  testing being done for obesity.   • Gross hematuria    urine culture >100k E.coli   • History of pelvic ultrasound    Pelvic US unremarkable   • Infertility management    Clomid & IUI to conceive  pregnancy   • Lightheadedness   • Migraine with aura   • Morbid obesity with BMI of 40.0-44.9, adult (HCC)   • Numbness and tingling    Rheum visit 23 - clinical picture, imaging, lab data not consistent with known rheum disorders. lower extremity numbness and tingling, you could consider nerve conduction studies or special testing for small fiber neuropathy (though admittedly, this would not explain vertigo, visual snow or migraine symptoms).   • Pap smear for cervical cancer screening    Pap & HPV negative 23 - Peggy Melgar. Per [facility]   • Prediabetes    10/4/23 A1c 6.4%   • Sciatica   • Skin lesion of breast    24 Diagnostic mammogram for a draining skin lesion RIGHT breast at 8 o'clock 4 cm from nipple. Consistent with sebaceous cyst.   • Vertigo    mal de debarquement syndrome. MDDS - frontal lobe communicates incorrectly to vestibular system. Feels likes she is moving when she is not. Went to PT. Helped. Valium helps to stop the symptoms.   • Visual disturbance   • Visual snow syndrome    reports this developed while on Nexplanon. fuzzy dots in front of her 20/20 vision all the time. Has a fog & some \"purple marks\" in her vision as well. Negative image for up to 5 minutes at its worst. Can get black spots/moving spots.       Past Surgical History:   Procedure Laterality Date   • Appendectomy  10/11/2014   • Colonoscopy & polypectomy  2019    Does have polyps. Was told every 3 years   • Cystourethroscopy  10/18/2023    Cystoscopy Dr. Ko Flores for gross hematuria. Unremarkable.   • Egd  2019    esophagus inflamed. Was having dysphagia & GERD.   • Hysterosalpingogram - external referral  2023    normal cavity. By IVF doctor Quang Walker   • Insert  contraceptive capsul  2012    Nexplanon   • Knee surgery Left 2019   • Laparoscopic appendectomy  10/11/2014    not ruptured   • Laparoscopic cholecystectomy      +Stones & sludge   • Other surgical history Right 2023    right ringer finger cyst removal   • Removal of contraceptive capsul      H/o \"severe allergic reaction\" to birth control (Nexplanon) which consisted of severe migraines, visual issues. Reports this episode occurred after one year with nexplanon. Initially improved after removal of nexplanon implant, but then reoccurred with subsequent menstrual cycle. States these symptoms significantly improved with nuva ring and cessation of menstrual cycles   • Removal of ovarian cyst(s) Right 10/11/2014    done at same time of appendectomy. Maybe just functional cyst   • Sinus surgery        deviated septum   • Spinal puncture,lumbar,diagnostic      She had an LP in  which, per report, showed the following:-- CSF May 2014. Glc 51, protein 34, WBC 0, RBC 6, VDRL negative, OCB negative, opening pressure 14.5 cm in lateral position.   • Tonsillectomy         Gynecological History:  Menarche at age 13 and LMP 2023  Pt is a   Pt was 34 years old at time of first pregnancy.    She has cumulative breastfeeding history of 2 months, last currently.  Age of Menopause: n/a  Type: n/a  She denies any history of hormone replacement therapy  She has history of oral contraceptive use for 13 years, last .   She denies infertility treatment to achieve pregnancy.    Medications:    • LEVOTHYROXINE 50 MCG Oral Tab TAKE 1 TABLET (50 MCG TOTAL) BY MOUTH DAILY. OVERDUE FOR LABS INCLUDING RECHECK OF TSH. 90 tablet 0   • ibuprofen 600 MG Oral Tab Take 1 tablet (600 mg total) by mouth every 6 (six) hours as needed for Pain. 30 tablet 0   • lidocaine-prilocaine 2.5-2.5 % External Cream Apply topically as needed.     • ferrous sulfate 325 (65 FE) MG Oral Tab EC Take 1 tablet (325 mg total) by  mouth every other day.     • cetirizine 10 MG Oral Tab Take 1 tablet (10 mg total) by mouth daily.     • Methylcobalamin (B12-ACTIVE OR) Take by mouth.     • omeprazole 20 MG Oral Capsule Delayed Release Take 1 capsule (20 mg total) by mouth daily. 90 capsule 3   • albuterol (5 MG/ML) 0.5% Inhalation Nebu Soln Take 0.5 mL (2.5 mg total) by nebulization every 6 (six) hours as needed.     • albuterol (2.5 MG/3ML) 0.083% Inhalation Nebu Soln Take 3 mL (2.5 mg total) by nebulization every 6 (six) hours as needed.     • montelukast 10 MG Oral Tab Take 1 tablet (10 mg total) by mouth nightly.     • prenatal vitamin with DHA 27-0.8-228 MG Oral Cap Take 1 capsule by mouth daily.     • Cholecalciferol (VITAMIN D-3) 25 MCG (1000 UT) Oral Cap Take 6,000 Int'l Units by mouth daily.         Allergies:    Allergies[1]    Family History:   Family History   Problem Relation Age of Onset   • Colon Polyps Father    • Infertility Mother         Took Clomid. Short luteal phase   • Breast Cancer Maternal Grandmother 73   • Prostate Cancer Maternal Grandfather         dx around 50 or maybe younger   • Thyroid disease Paternal Grandmother    • Other (RA) Paternal Grandmother    • No Known Problems Paternal Grandfather    • Infertility Sister         Unexplained infertility. IVF for 1st & then conceived spontaneously   • Other (genetic carrier) Sister         3 different mutations: CF, hereditary fructose intolerance, ARSACS carrier   • Ovarian Cancer Neg    • Colon Cancer Neg    • Diabetes Neg    • Birth Defects Neg    • Genetic Disease Neg    • Endometriosis Neg    • Autoimmune disease Neg    • Uterine Cancer Neg    • Pancreatic Cancer Neg        She is not of Ashkenazi Restoration ancestry.    Social History:  History   Alcohol Use   • Yes     Comment: rarely       History   Smoking Status   • Never   Smokeless Tobacco   • Never       Review of Systems:    Review of Systems   Constitutional:  Negative for activity change, appetite change,  chills, fatigue and unexpected weight change.   HENT:  Negative for ear pain, hearing loss, nosebleeds, sore throat, trouble swallowing and voice change.    Eyes:  Negative for pain and visual disturbance.   Respiratory:  Negative for cough, chest tightness and shortness of breath.    Cardiovascular:  Negative for chest pain, palpitations and leg swelling.   Gastrointestinal:  Negative for nausea, vomiting, abdominal pain, diarrhea, constipation and blood in stool.   Endocrine: Negative for cold intolerance and heat intolerance.   Genitourinary:  Negative for dysuria, hematuria and difficulty urinating.   Musculoskeletal:  Negative for back pain, joint swelling, joint pain and neck pain.   Skin:  Negative for color change, rash and wound.   Allergic/Immunologic: Negative for environmental allergies.   Neurological:  Negative for tremors, syncope, facial asymmetry, speech difficulty and weakness.   Hematological:  Negative for adenopathy. Does not bruise/bleed easily.   Psychiatric/Behavioral:  Negative for hallucinations, behavioral problems, confusion, agitation and depressed mood.       Otherwise as per HPI.    Physical Exam:    /64   Pulse 61   Temp 97.8 °F (36.6 °C) (Tympanic)   Resp 16   Wt 122.8 kg (270 lb 12.8 oz)   LMP 12/18/2023   SpO2 99%   BMI 39.99 kg/m²     Physical Exam  Vitals reviewed.   Constitutional:       Appearance: Normal appearance.   HENT:      Head: Normocephalic and atraumatic.   Eyes:      Extraocular Movements: Extraocular movements intact.      Pupils: Pupils are equal, round, and reactive to light.   Cardiovascular:      Rate and Rhythm: Normal rate.   Pulmonary:      Effort: Pulmonary effort is normal.   Chest:   Breasts:     Right: Skin change present. No mass, nipple discharge or tenderness.      Left: Normal. No mass, nipple discharge, skin change or tenderness.       Abdominal:      General: Abdomen is flat.      Palpations: Abdomen is soft.   Musculoskeletal:          General: Normal range of motion.      Cervical back: Normal range of motion and neck supple.   Lymphadenopathy:      Upper Body:      Right upper body: No supraclavicular or axillary adenopathy.      Left upper body: No supraclavicular or axillary adenopathy.   Skin:     General: Skin is warm and dry.   Neurological:      General: No focal deficit present.      Mental Status: She is alert and oriented to person, place, and time.   Psychiatric:         Mood and Affect: Mood normal.        Bra size: DDD (XL)    Labs/imaging: reviewed in EPIC    Impression:   Ms. Mary Huerta is a 34 year old woman that presents for evaluation of right breast sebaceous cyst.    Discussion and Plan:  I had a discussion with the Patient and Spouse regarding her breast exam. On exam today I found a small area of erythema without signs of infection. We discussed sebaceous cyst.  I stated that this is not associated with breast cancer.  Clinically and radiologically this appears consistent with a noninfected sebaceous cyst.  I discussed that usually these are noted as a bump on the skin (could be anywhere on the body) or once they have become infected.  There is no reason to remove it at this time, but I explained if it becomes infected she should feel free to reach out for drainage procedure. I personally reviewed her recent imaging.    Patient will also likely be following up with a dermatologist soon.     Further imaging:   Mammogram: First screening mammogram at age 40    If breasts are extremely dense at that time, would recommend supplemental annual screening with complete bilateral breast ultrasound at 6 month intervals from mammogram.    Return to clinic: as needed    She was given ample opportunity for questions and those questions were answered to her satisfaction. She has been encouraged to contact the office with any questions or concerns. This encounter lasted a total of 55 minutes, more than 50% of which was dedicated  to the discussion of management options.     Guanaco Wilson MD  Breast Surgical Oncology    CC: Dr. Naylor

## 2024-11-11 ENCOUNTER — TELEPHONE (OUTPATIENT)
Dept: PHYSICAL THERAPY | Facility: HOSPITAL | Age: 34
End: 2024-11-11

## 2024-11-12 ENCOUNTER — OFFICE VISIT (OUTPATIENT)
Dept: PHYSICAL THERAPY | Age: 34
End: 2024-11-12
Attending: OBSTETRICS & GYNECOLOGY
Payer: COMMERCIAL

## 2024-11-12 ENCOUNTER — OFFICE VISIT (OUTPATIENT)
Dept: SURGERY | Facility: CLINIC | Age: 34
End: 2024-11-12
Payer: COMMERCIAL

## 2024-11-12 VITALS
RESPIRATION RATE: 16 BRPM | DIASTOLIC BLOOD PRESSURE: 64 MMHG | TEMPERATURE: 98 F | OXYGEN SATURATION: 99 % | HEART RATE: 61 BPM | SYSTOLIC BLOOD PRESSURE: 111 MMHG | BODY MASS INDEX: 40 KG/M2 | WEIGHT: 270.81 LBS

## 2024-11-12 DIAGNOSIS — N60.81 SEBACEOUS CYST OF SKIN OF RIGHT BREAST: Primary | ICD-10-CM

## 2024-11-12 PROCEDURE — 3078F DIAST BP <80 MM HG: CPT | Performed by: SURGERY

## 2024-11-12 PROCEDURE — 3074F SYST BP LT 130 MM HG: CPT | Performed by: SURGERY

## 2024-11-12 PROCEDURE — 99205 OFFICE O/P NEW HI 60 MIN: CPT | Performed by: SURGERY

## 2024-11-12 PROCEDURE — 97110 THERAPEUTIC EXERCISES: CPT

## 2024-11-12 PROCEDURE — 97161 PT EVAL LOW COMPLEX 20 MIN: CPT

## 2024-11-12 NOTE — PROGRESS NOTES
MUSCULOSKELETAL AND PELVIC FLOOR EVALUATION:     Diagnosis:     Pelvic floor dysfunction (M62.89)  Chronic interstitial cystitis (N30.10)  Dyspareunia, female (N94.10)  Postpartum state (HCC) (Z39.2)         Referring Provider: Danita Diaz  Date of Evaluation:    2024    Precautions:   none  Next MD visit:   none scheduled  Date of Surgery: n/a     PATIENT SUMMARY   Mary Huerta is a 34 year old female  who presents to therapy today with complaints dyspareunia. Pt was seen prior to delivery and was demonstrating tight hips and pelvic floor. Pt did end up with a c section.  Pt did take an antibiotics that did help with feeling ill from placenta, did not require a DNC. Today pt has brought baby Cabrera with her who has been cluster feeding, unable to complete the ortho or internal exam. Will cont next week,     Current symptoms include:      Pt describes pain level:  back pain intermittently.  Intermittent numbness in the L LE.    Quality:  low back intermittently.     Pregnant Now:  no   Obstetrical/Gynecological history:  9.14 male c section  birth   Urodynamic Test: NA  Manometry: NA  Occupation/Activities:  [employer]   PFDI-20:  see media     Mary describes prior level of function I. Pt goals include .  Past medical history was reviewed with Mary. Significant findings include   Past Medical History:    Abdominal pain    Acute severe asthma (HCC)    Asthma (HCC)    Asthmatic bronchitis (HCC)    Chronic interstitial cystitis    Symptoms since 8 years old but not formally diagnosed until adulthood.    Colon polyp    Complicated migraine    with visual aura, verbal & balance/coordination issues.    Dizziness    Dysphagia    E-coli UTI    Eczema    Ganglion of flexor tendon sheath of right index finger    GERD (gastroesophageal reflux disease)    Gestational hypertension (HCC)    noted at admission at 38+ wk for non reassuring  testing being done for obesity.    Gross hematuria    urine  culture >100k E.coli    History of pelvic ultrasound    Pelvic US unremarkable    Infertility management    Clomid & IUI to conceive 2024 pregnancy    Lightheadedness    Migraine with aura    Morbid obesity with BMI of 40.0-44.9, adult (HCC)    Numbness and tingling    Rheum visit 1/20/23 - clinical picture, imaging, lab data not consistent with known rheum disorders. lower extremity numbness and tingling, you could consider nerve conduction studies or special testing for small fiber neuropathy (though admittedly, this would not explain vertigo, visual snow or migraine symptoms).    Pap smear for cervical cancer screening    Pap & HPV negative 2/13/23 - Peggy Melgar. Per [facility]    Prediabetes    10/4/23 A1c 6.4%    Sciatica    Skin lesion of breast    11/7/24 Diagnostic mammogram for a draining skin lesion RIGHT breast at 8 o'clock 4 cm from nipple. Consistent with sebaceous cyst.    Vertigo    mal de debarquement syndrome. MDDS - frontal lobe communicates incorrectly to vestibular system. Feels likes she is moving when she is not. Went to PT. Helped. Valium helps to stop the symptoms.    Visual disturbance    Visual snow syndrome    reports this developed while on Nexplanon. fuzzy dots in front of her 20/20 vision all the time. Has a fog & some \"purple marks\" in her vision as well. Negative image for up to 5 minutes at its worst. Can get black spots/moving spots.    .    URINARY HABITS  Types of symptoms:    Events associated with the onset of urinary complaints: with the IC - urgency on the way can have a loss of urine. No longer dribbling post voiding. Can hold for 2-3 hours.   Abdominal/Vaginal Pressure complaints:   depends if having increased IC symptoms   Urinary Frequency: normal   Urine Stream: normal   Amount: normal   Leaking occurs:  not at this time, occasional feeling disrobing if she has waited to long to void.   Episodes of Leakage:   see above   Amount of leakage:  small amount,  in the first 6 weeks post partum did have some leakage without knowing . Has improved since then   Pad use:  no   Pad Change frequency: NA  Nocturia: not voiding over night- 1-2 x a day   Fluid Intake:  water  Bladder irritants:  have not pin pointed with hormone changes.   Urine Stop test: NA  Post void dribble: none   Hovering: No  Empty bladder just in case: no  Do you ever leak urine without knowing it?  First 6 weeks post partum, not at this time.     BOWEL HABITS  Types of symptoms:  everyday    Frequency of bowel movements:  1 x a day   Stool consistency: [place] Stool Scale: 4. The last few days pt has had some more constipation like stools  Do you strain with defecation:  no    Laxative use:  no miralax / benefiber     SEXUAL HEALTH STATUS  Marinoff Scale: NA  History of Sexual Abuse:  NA  Sexual [place] Status:  one time was uncomfortable, one time not  Pain with initial and/or deep penetration:  active  Pain with pelvic exam/tampon use:  NA    ASSESSMENT  Mary presents to physical therapy evaluation with primary c/o  dyspareunia, IC . The results of the objective tests and measures show will assess next appt .  Functional deficits include but are not limited to  painless intercourse, decreased pressure and pelvic discomfort .  Signs and symptoms are consistent with diagnosis of IC and dyspareunia  . Pt and PT discussed evaluation findings, pathology, POC and HEP.  Pt voiced understanding and performs HEP correctly without reported pain. Skilled Pelvic Physical Therapy is medically necessary to address the above impairments and reach functional goals.    OBJECTIVE:   Posture:  normal   Pelvic Alignment: NA  Deep Tendon Reflexes:  NA  Gait: pt ambulates on level ground with  improved pubic symphysis pain, can not walk as far as she would like, still feels some uncomfortableness in the area, but can go further at this time when compared to when she was pregnant. .     External Palpation: NA  Scars  (location/surgery):  c section scar looks healthy. Discussed scar massage for de sensitizing and to avoid scar tissue build up   Abdominal Wall Integrity:    Diastasis Recti:  1 sternal   2 fingers above and below     Range Of Motion  Lumbar AROM screen: NA  LE AROM screen: grossly WNL except: NA    Strength (MMT) 5/5 DONNA LE except NA  Transverse Abdominis: NA/5    Flexibility Summary: WNL DONNA LE except NA    Special Tests  NA    Informed consent for internal pelvic evaluation given: Yes    External Observation:   Voluntary contraction: NA   Voluntary relaxation: NA  Involuntary contraction: NA  Involuntary relaxation: NA    Mons pubis: NA  Labia majora: NA  Labia minora: NA  Urethral meatus: NA  Introitus: NA  Perineal body: NA    Sensory/Reflex:  Vestibule: NA  Anal Reading: NA    Internal Examination   Scar: NA    Pelvic Floor Muscle strength: (PERF= Power/Endurance/Reps/Fast) MMT:  NA  External Anal Sphincter: NA  Accessory Muscle Use: NA    Tissue Laxity Test:  Anterior Wall: NA  Posterior Wall: NA  Apical: NA    Eccentric lengthening contraction: NA  Bearing down Valsalva maneuver (2-3x): NA    Internal Palpation: WNL except NA    Today's Treatment and Response:  subjective portion and DR assessment today   Patient education was provided on objective findings of external and internal evaluation and expectations with treatment outcomes. Educated on  log rolling out of bed  Not straining with bowel movements  How to contract the TrA with ADLs and picking up the baby. Etc.     Patient was instructed in and issued a HEP for: diet/bladder/bowel diary     Charges: PT Eval Low Complexity, EX 1       Total Timed Treatment: 45 min     Total Treatment Time: 45 min     Based on clinical rationale and outcome measures, this evaluation involved low decision making due to 1-2 personal factors/comorbidities, 3 body structures involved/activity limitations, and evolving symptoms including  UI and dyspareunia   PLAN OF CARE:     Goals: (to be met in 8 visits)  Cont with internal and ortho portion of the exam next appt  Pt will report no leakage on the way to the bathroom - only hold 2-3 hours at a time  Pt will report walking one mile without pubic pain   Pt will demo full contraction and relaxation of the pelvic floor for 10 fast contractions  Pt will hold the PF for 10 seconds  and demo full relaxation     Frequency / Duration: Patient will be seen for 1 x/week or a total of 8 visits over a 90 day period.  Treatment will include: cont with internal exam- manual work to decrease tightness. Increase hip mobility. Core strengthening. Posture education      Education or treatment limitation: None  Rehab Potential:good      Patient/Family/Caregiver was advised of these findings, precautions, and treatment options and has agreed to actively participate in planning and for this course of care.    Thank you for your referral. Please co-sign or sign and return this letter via fax as soon as possible to [phone]. If you have any questions, please contact me at Dept: [phone]    Sincerely,  Electronically signed by therapist: Cassie Garrido, PT  Physician's certification required: Yes  I certify the need for these services furnished under this plan of treatment and while under my care.    X___________________________________________________ Date____________________    Certification From: 11/12/2024  To:2/10/2025

## 2024-11-19 ENCOUNTER — APPOINTMENT (OUTPATIENT)
Dept: PHYSICAL THERAPY | Age: 34
End: 2024-11-19
Attending: OBSTETRICS & GYNECOLOGY
Payer: COMMERCIAL

## 2024-11-19 ENCOUNTER — TELEPHONE (OUTPATIENT)
Dept: INTERNAL MEDICINE CLINIC | Facility: CLINIC | Age: 34
End: 2024-11-19

## 2024-11-19 NOTE — TELEPHONE ENCOUNTER
Mary Huerta was scheduled for an appt on 11/19/24.    Appointment was a No Show.      Letter mailed to home address on file.

## 2024-11-26 ENCOUNTER — TELEPHONE (OUTPATIENT)
Dept: PHYSICAL THERAPY | Age: 34
End: 2024-11-26

## 2024-11-27 ENCOUNTER — HOSPITAL ENCOUNTER (OUTPATIENT)
Age: 34
Discharge: HOME | End: 2024-11-27
Payer: COMMERCIAL

## 2024-11-27 VITALS
WEIGHT: 268 LBS | RESPIRATION RATE: 24 BRPM | TEMPERATURE: 98 F | DIASTOLIC BLOOD PRESSURE: 66 MMHG | SYSTOLIC BLOOD PRESSURE: 126 MMHG | HEIGHT: 69 IN | OXYGEN SATURATION: 99 % | BODY MASS INDEX: 39.69 KG/M2 | HEART RATE: 70 BPM

## 2024-11-27 DIAGNOSIS — J04.0 ACUTE LARYNGITIS: Primary | ICD-10-CM

## 2024-11-27 LAB — S PYO AG THROAT QL: NEGATIVE

## 2024-11-27 PROCEDURE — 99213 OFFICE O/P EST LOW 20 MIN: CPT | Performed by: NURSE PRACTITIONER

## 2024-11-27 PROCEDURE — 87880 STREP A ASSAY W/OPTIC: CPT | Performed by: NURSE PRACTITIONER

## 2024-11-27 RX ORDER — FLUTICASONE PROPIONATE 50 MCG
2 SPRAY, SUSPENSION (ML) NASAL DAILY
Qty: 16 G | Refills: 0 | Status: SHIPPED | OUTPATIENT
Start: 2024-11-27

## 2024-11-27 NOTE — ED PROVIDER NOTES
Patient Seen in: [facility]      History     Chief Complaint   Patient presents with    Cough/URI     Stated Complaint: loss voice  Subjective:   34-year-old female with asthma presents from home.  Patient is here with cold symptoms for about 6 days.  Having some cough and congestion.  Recently developed sore throat and lost her voice.  Noticed some white spots on her throat.  Some associated sinus pressure that she notes has improved.  She has been taking Mucinex.  States the sore throat has been persistent with some green mucus.  Some postnasal drip.  She has taken Tylenol for pain.  She had a negative COVID test at home.  States she is prone to croup.  She is 2 months postpartum and breast-feeding start    The history is provided by the patient. No  was used.     Objective:   Past Medical History:    Abdominal pain    Acute severe asthma (HCC)    Asthma (HCC)    Asthmatic bronchitis (HCC)    Chronic interstitial cystitis    Symptoms since 8 years old but not formally diagnosed until adulthood.    Colon polyp    Complicated migraine    with visual aura, verbal & balance/coordination issues.    Dizziness    Dysphagia    E-coli UTI    Eczema    Ganglion of flexor tendon sheath of right index finger    GERD (gastroesophageal reflux disease)    Gestational hypertension (HCC)    noted at admission at 38+ wk for non reassuring  testing being done for obesity.    Gross hematuria    urine culture >100k E.coli    History of pelvic ultrasound    Pelvic US unremarkable    Infertility management    Clomid & IUI to conceive  pregnancy    Lightheadedness    Migraine with aura    Morbid obesity with BMI of 40.0-44.9, adult (HCC)    Numbness and tingling    Rheum visit 23 - clinical picture, imaging, lab data not consistent with known rheum disorders. lower extremity numbness and tingling, you could consider nerve conduction studies or special testing for small fiber  neuropathy (though admittedly, this would not explain vertigo, visual snow or migraine symptoms).    Pap smear for cervical cancer screening    Pap & HPV negative 2/13/23 - Peggy Melgar. Per [facility]where    Prediabetes    10/4/23 A1c 6.4%    Sciatica    Skin lesion of breast    11/7/24 Diagnostic mammogram for a draining skin lesion RIGHT breast at 8 o'clock 4 cm from nipple. Consistent with sebaceous cyst.    Vertigo    mal de debarquement syndrome. MDDS - frontal lobe communicates incorrectly to vestibular system. Feels likes she is moving when she is not. Went to PT. Helped. Valium helps to stop the symptoms.    Visual disturbance    Visual snow syndrome    reports this developed while on Nexplanon. fuzzy dots in front of her 20/20 vision all the time. Has a fog & some \"purple marks\" in her vision as well. Negative image for up to 5 minutes at its worst. Can get black spots/moving spots.            Past Surgical History:   Procedure Laterality Date    Appendectomy  10/11/2014    Colonoscopy & polypectomy  2019    Does have polyps. Was told every 3 years    Cystourethroscopy  10/18/2023    Cystoscopy Dr. Ko Flores for gross hematuria. Unremarkable.    Egd  2019    esophagus inflamed. Was having dysphagia & GERD.    Hysterosalpingogram - external referral  11/09/2023    normal cavity. By IVF doctor Quang Walker    Insert contraceptive capsul  2012    Nexplanon    Knee surgery Left 01/2019    Laparoscopic appendectomy  10/11/2014    not ruptured    Laparoscopic cholecystectomy  2012    +Stones & sludge    Other surgical history Right 08/09/2023    right ringer finger cyst removal    Removal of contraceptive capsul  2014    H/o \"severe allergic reaction\" to birth control (Nexplanon) which consisted of severe migraines, visual issues. Reports this episode occurred after one year with nexplanon. Initially improved after removal of nexplanon implant, but then reoccurred with subsequent menstrual cycle.  States these symptoms significantly improved with nuva ring and cessation of menstrual cycles    Removal of ovarian cyst(s) Right 10/11/2014    done at same time of appendectomy. Maybe just functional cyst    Sinus surgery    2019    deviated septum    Spinal puncture,lumbar,diagnostic  2014    She had an LP in 2014 which, per report, showed the following:-- CSF May 2014. Glc 51, protein 34, WBC 0, RBC 6, VDRL negative, OCB negative, opening pressure 14.5 cm in lateral position.    Tonsillectomy  1995              Social History     Socioeconomic History    Marital status:    Tobacco Use    Smoking status: Never     Passive exposure: Never    Smokeless tobacco: Never   Vaping Use    Vaping status: Never Used   Substance and Sexual Activity    Alcohol use: Yes     Comment: rarely    Drug use: Never    Sexual activity: Yes     Partners: Male     Social Drivers of Health     Financial Resource Strain: Low Risk  (9/13/2024)    Financial Resource Strain     Difficulty of Paying Living Expenses: Not very hard     Med Affordability: No   Food Insecurity: No Food Insecurity (9/13/2024)    Food Insecurity     Food Insecurity: Never true   Transportation Needs: No Transportation Needs (9/13/2024)    Transportation Needs     Lack of Transportation: No     Car Seat: Yes   Stress: No Stress Concern Present (9/13/2024)    Stress     Feeling of Stress : No   Housing Stability: Low Risk  (9/13/2024)    Housing Stability     Housing Instability: No     Crib or Bassinette: Yes            Review of Systems    Positive for stated complaint: Cough/URI    Other systems are as noted in HPI.  Constitutional and vital signs reviewed.      All other systems reviewed and negative except as noted above.    Physical Exam     ED Triage Vitals [11/27/24 1159]   /66   Pulse 70   Resp 24   Temp 97.9 °F (36.6 °C)   Temp src Temporal   SpO2 99 %   O2 Device None (Room air)     Current:/66   Pulse 70   Temp 97.9 °F (36.6 °C)  (Temporal)   Resp 24   Ht 175.3 cm (5' 9\")   Wt 121.6 kg   LMP 12/18/2023   SpO2 99%   Breastfeeding Yes   BMI 39.58 kg/m²     Physical Exam  Vitals and nursing note reviewed.   Constitutional:       General: She is not in acute distress.     Appearance: Normal appearance. She is not ill-appearing or toxic-appearing.   HENT:      Head: Normocephalic and atraumatic.      Right Ear: Tympanic membrane, ear canal and external ear normal.      Left Ear: Tympanic membrane, ear canal and external ear normal.      Nose: Nose normal.      Mouth/Throat:      Mouth: Mucous membranes are moist.      Pharynx: Oropharynx is clear. Posterior oropharyngeal erythema present. No pharyngeal swelling.      Tonsils: No tonsillar exudate.      Comments: Post tonsillectomy.  Raspy voice.  Eyes:      Pupils: Pupils are equal, round, and reactive to light.   Cardiovascular:      Rate and Rhythm: Normal rate and regular rhythm.      Pulses: Normal pulses.   Pulmonary:      Effort: Pulmonary effort is normal. No respiratory distress.      Breath sounds: Normal breath sounds.      Comments: Lungs clear.  No adventitious lung sounds.  No distress.  No hypoxia.  Pulse ox 99% ra. Which is normal    Abdominal:      General: Abdomen is flat.      Palpations: Abdomen is soft.   Musculoskeletal:         General: No signs of injury. Normal range of motion.      Cervical back: Normal range of motion and neck supple.   Lymphadenopathy:      Cervical: No cervical adenopathy.   Skin:     General: Skin is warm and dry.      Capillary Refill: Capillary refill takes less than 2 seconds.   Neurological:      General: No focal deficit present.      Mental Status: She is alert and oriented to person, place, and time.      GCS: GCS eye subscore is 4. GCS verbal subscore is 5. GCS motor subscore is 6.   Psychiatric:         Mood and Affect: Mood normal.         Behavior: Behavior normal.         Thought Content: Thought content normal.         Judgment:  Judgment normal.         ED Course   Radiology:  No results found.  Labs Reviewed   POCT RAPID STREP - Normal       MDM     Medical Decision Making  Differential diagnoses reflecting the complexity of care include: COVID, strep, pneumonia, laryngitis, sinusitis  Patient with raspy voice but otherwise well-appearing, nontoxic  Rapid strep negative.  No PTA.  Oropharynx clear  No stridor, not consistent with croup  No evidence of pneumonia  No active asthma symptoms  Discussed the option of COVID testing but had negative COVID test at home  Symptoms appear viral    Plan of Care: Tylenol, Flonase, warm steam to face, hot tea with honey.  Follow-up with primary doctor if no improvement    Results and plan of care discussed with the patient/family. They are in agreement with discharge. They understand to follow up with their primary doctor or the referral physician for further evaluation, especially if no improvement.  Also discussed the limitations of immediate care, patient is aware that if symptoms are worse they should go to the emergency room. Verbal and written discharge instructions were given.     My independent interpretation of studies of: Strep negative  Diagnostic tests and medications considered but not ordered were: No indication for antibiotics today  Shared decision making was done by: Declined COVID testing today  Comorbidities that add complexity to management include: Asthma  External chart review was done and was noted: Postpartum 9/14/24  History obtained by an independent source was from: N/A  Discussions and management was done with: N/A   Social determinants of health that affect care: N/A              Problems Addressed:  Acute laryngitis: acute illness or injury    Amount and/or Complexity of Data Reviewed  Labs: ordered. Decision-making details documented in ED Course.    Risk  OTC drugs.        Disposition and Plan     Clinical Impression:  1. Acute laryngitis          Disposition:  Discharge  11/27/2024 12:31 pm    Follow-up:  Yin Naylor  [address] [address] [phone]                Medications Prescribed:  Discharge Medication List as of 11/27/2024 12:32 PM        START taking these medications    Details   fluticasone propionate 50 MCG/ACT Nasal Suspension 2 sprays by Nasal route daily., Normal, Disp-16 g, R-0

## 2024-11-27 NOTE — ED INITIAL ASSESSMENT (HPI)
Pt c/o congestion since Friday, today, has sore throat and laryngitis, rpt she had sinus pressure last week, however, that has improved

## 2024-11-27 NOTE — DISCHARGE INSTRUCTIONS
Strep is negative.  Symptoms are viral.  Continue Tylenol as needed for pain.  Drink plenty of fluids, water, hot tea with honey and lemon.  You may want warm steam to your face.  Start Flonase.  Follow-up with your primary doctor if no improvement

## 2024-12-04 ENCOUNTER — APPOINTMENT (OUTPATIENT)
Dept: PHYSICAL THERAPY | Age: 34
End: 2024-12-04
Attending: OBSTETRICS & GYNECOLOGY

## 2024-12-11 ENCOUNTER — APPOINTMENT (OUTPATIENT)
Dept: PHYSICAL THERAPY | Age: 34
End: 2024-12-11
Attending: OBSTETRICS & GYNECOLOGY

## 2024-12-18 ENCOUNTER — APPOINTMENT (OUTPATIENT)
Dept: PHYSICAL THERAPY | Age: 34
End: 2024-12-18
Attending: OBSTETRICS & GYNECOLOGY

## 2025-01-07 ENCOUNTER — APPOINTMENT (OUTPATIENT)
Dept: PHYSICAL THERAPY | Age: 35
End: 2025-01-07
Attending: OBSTETRICS & GYNECOLOGY
Payer: COMMERCIAL

## 2025-01-14 ENCOUNTER — APPOINTMENT (OUTPATIENT)
Dept: PHYSICAL THERAPY | Age: 35
End: 2025-01-14
Attending: OBSTETRICS & GYNECOLOGY
Payer: COMMERCIAL

## 2025-01-18 DIAGNOSIS — E07.9 THYROID DYSFUNCTION IN PREGNANCY IN FIRST TRIMESTER (HCC): ICD-10-CM

## 2025-01-18 DIAGNOSIS — O99.281 THYROID DYSFUNCTION IN PREGNANCY IN FIRST TRIMESTER (HCC): ICD-10-CM

## 2025-01-20 RX ORDER — LEVOTHYROXINE SODIUM 50 UG/1
50 TABLET ORAL DAILY
Qty: 30 TABLET | Refills: 0 | Status: SHIPPED | OUTPATIENT
Start: 2025-01-20

## 2025-02-03 ENCOUNTER — TELEPHONE (OUTPATIENT)
Dept: INTERNAL MEDICINE CLINIC | Facility: CLINIC | Age: 35
End: 2025-02-03

## 2025-02-03 ENCOUNTER — NURSE TRIAGE (OUTPATIENT)
Dept: INTERNAL MEDICINE CLINIC | Facility: CLINIC | Age: 35
End: 2025-02-03

## 2025-02-03 NOTE — TELEPHONE ENCOUNTER
Patient made appt through [facility] for the below. Not sure if appt needs to be triaged. High TE    Asthma, allergies, overall health     Future Appointments   Date Time Provider Department Center   2/4/2025  9:00 AM Janette Spaulding DO [facility]

## 2025-02-03 NOTE — TELEPHONE ENCOUNTER
Action Requested: Summary for Provider     []  Critical Lab, Recommendations Needed  [] Need Additional Advice  []   FYI    []   Need Orders  [] Need Medications Sent to Pharmacy  []  Other     SUMMARY: Patient made appointment to establish PCP care for her asthma and allergies.  States she currently has a rash x5 days, localized.  Denies shortness of breath, throat swelling/closing or difficulty swallowing. States she has been taking benadryl and has been dealing with allergies her whole life. No need for UC/ED care.         Reason for call: Allergies  Onset: 5 days      Reason for Disposition   Patient wants to be seen    Protocols used: Rash or Redness - [id number]

## 2025-02-04 ENCOUNTER — OFFICE VISIT (OUTPATIENT)
Dept: INTERNAL MEDICINE CLINIC | Facility: CLINIC | Age: 35
End: 2025-02-04
Payer: COMMERCIAL

## 2025-02-04 VITALS
TEMPERATURE: 97 F | WEIGHT: 273.38 LBS | OXYGEN SATURATION: 99 % | HEIGHT: 69 IN | DIASTOLIC BLOOD PRESSURE: 70 MMHG | SYSTOLIC BLOOD PRESSURE: 120 MMHG | RESPIRATION RATE: 18 BRPM | HEART RATE: 70 BPM | BODY MASS INDEX: 40.49 KG/M2

## 2025-02-04 DIAGNOSIS — Z88.9 MULTIPLE ALLERGIES: ICD-10-CM

## 2025-02-04 DIAGNOSIS — E03.9 HYPOTHYROIDISM, UNSPECIFIED TYPE: Primary | ICD-10-CM

## 2025-02-04 DIAGNOSIS — K21.9 GASTROESOPHAGEAL REFLUX DISEASE WITHOUT ESOPHAGITIS: ICD-10-CM

## 2025-02-04 DIAGNOSIS — Z91.018 MULTIPLE FOOD ALLERGIES: ICD-10-CM

## 2025-02-04 DIAGNOSIS — J45.20 MILD INTERMITTENT ASTHMA WITHOUT COMPLICATION (HCC): ICD-10-CM

## 2025-02-04 PROCEDURE — 90656 IIV3 VACC NO PRSV 0.5 ML IM: CPT | Performed by: FAMILY MEDICINE

## 2025-02-04 PROCEDURE — 3078F DIAST BP <80 MM HG: CPT | Performed by: FAMILY MEDICINE

## 2025-02-04 PROCEDURE — 3074F SYST BP LT 130 MM HG: CPT | Performed by: FAMILY MEDICINE

## 2025-02-04 PROCEDURE — 3008F BODY MASS INDEX DOCD: CPT | Performed by: FAMILY MEDICINE

## 2025-02-04 PROCEDURE — 99204 OFFICE O/P NEW MOD 45 MIN: CPT | Performed by: FAMILY MEDICINE

## 2025-02-04 PROCEDURE — 90471 IMMUNIZATION ADMIN: CPT | Performed by: FAMILY MEDICINE

## 2025-02-04 RX ORDER — MONTELUKAST SODIUM 10 MG/1
10 TABLET ORAL NIGHTLY
Qty: 90 TABLET | Refills: 3 | Status: SHIPPED | OUTPATIENT
Start: 2025-02-04

## 2025-02-04 RX ORDER — ALBUTEROL SULFATE 90 UG/1
2 INHALANT RESPIRATORY (INHALATION) EVERY 4 HOURS PRN
Qty: 1 EACH | Refills: 2 | Status: SHIPPED | OUTPATIENT
Start: 2025-02-04

## 2025-02-06 NOTE — PROGRESS NOTES
Subjective:   Patient ID: Mary Huerta is a 34 year old female.    HPI Here to establish care. Patient has 4 month old. Has been on Levothyroxine since pregnancy. Has not had levels checked since delivering baby. Breastfeeding.   Heartburn symptoms.   Has multiple food allergies and other allergies. Taking Zyrtec but this doesn't completely control her symptoms. Has recently had some lip swelling, itchiness.   Has asthma and no current symptoms. Uses rescue inhaler PRN.     History/Other:   Past Medical History:    Abdominal pain    Acute severe asthma (HCC)    Asthma (HCC)    Asthmatic bronchitis (HCC)    Chronic interstitial cystitis    Symptoms since 8 years old but not formally diagnosed until adulthood.    Colon polyp    Complicated migraine    with visual aura, verbal & balance/coordination issues.    Dizziness    Dysphagia    E-coli UTI    Eczema    Ganglion of flexor tendon sheath of right index finger    GERD (gastroesophageal reflux disease)    Gestational hypertension (HCC)    noted at admission at 38+ wk for non reassuring  testing being done for obesity.    Gross hematuria    urine culture >100k E.coli    History of pelvic ultrasound    Pelvic US unremarkable    Infertility management    Clomid & IUI to conceive  pregnancy    Lightheadedness    Migraine with aura    Morbid obesity with BMI of 40.0-44.9, adult (HCC)    Numbness and tingling    Rheum visit 23 - clinical picture, imaging, lab data not consistent with known rheum disorders. lower extremity numbness and tingling, you could consider nerve conduction studies or special testing for small fiber neuropathy (though admittedly, this would not explain vertigo, visual snow or migraine symptoms).    Pap smear for cervical cancer screening    Pap & HPV negative 23 - Peggy Melgar. Per [facility]    Prediabetes    10/4/23 A1c 6.4%    Sciatica    Skin lesion of breast    24 Diagnostic mammogram for a  draining skin lesion RIGHT breast at 8 o'clock 4 cm from nipple. Consistent with sebaceous cyst.    Vertigo    mal de debarquement syndrome. MDDS - frontal lobe communicates incorrectly to vestibular system. Feels likes she is moving when she is not. Went to PT. Helped. Valium helps to stop the symptoms.    Visual disturbance    Visual snow syndrome    reports this developed while on Nexplanon. fuzzy dots in front of her 20/20 vision all the time. Has a fog & some \"purple marks\" in her vision as well. Negative image for up to 5 minutes at its worst. Can get black spots/moving spots.     Past Surgical History:   Procedure Laterality Date    Appendectomy  10/11/2014    Colonoscopy & polypectomy  2019    Does have polyps. Was told every 3 years    Cystourethroscopy  10/18/2023    Cystoscopy Dr. Ko Flores for gross hematuria. Unremarkable.    Egd  2019    esophagus inflamed. Was having dysphagia & GERD.    Hysterosalpingogram - external referral  11/09/2023    normal cavity. By IVF doctor Quang Walker    Insert contraceptive capsul  2012    Nexplanon    Knee surgery Left 01/2019    Laparoscopic appendectomy  10/11/2014    not ruptured    Laparoscopic cholecystectomy  2012    +Stones & sludge    Other surgical history Right 08/09/2023    right ringer finger cyst removal    Removal of contraceptive capsul  2014    H/o \"severe allergic reaction\" to birth control (Nexplanon) which consisted of severe migraines, visual issues. Reports this episode occurred after one year with nexplanon. Initially improved after removal of nexplanon implant, but then reoccurred with subsequent menstrual cycle. States these symptoms significantly improved with nuva ring and cessation of menstrual cycles    Removal of ovarian cyst(s) Right 10/11/2014    done at same time of appendectomy. Maybe just functional cyst    Sinus surgery    2019    deviated septum    Spinal puncture,lumbar,diagnostic  2014    She had an LP in 2014 which, per report,  showed the following:-- CSF May 2014. Glc 51, protein 34, WBC 0, RBC 6, VDRL negative, OCB negative, opening pressure 14.5 cm in lateral position.    Tonsillectomy  1995     Social History     Socioeconomic History    Marital status:    Tobacco Use    Smoking status: Never     Passive exposure: Never    Smokeless tobacco: Never   Vaping Use    Vaping status: Never Used   Substance and Sexual Activity    Alcohol use: Yes     Comment: rarely    Drug use: Never    Sexual activity: Yes     Partners: Male     Social Drivers of Health     Food Insecurity: No Food Insecurity (9/13/2024)    Food Insecurity     Food Insecurity: Never true   Transportation Needs: No Transportation Needs (9/13/2024)    Transportation Needs     Lack of Transportation: No     Car Seat: Yes   Stress: No Stress Concern Present (9/13/2024)    Stress     Feeling of Stress : No   Housing Stability: Low Risk  (9/13/2024)    Housing Stability     Housing Instability: No     Crib or Bassinette: Yes     Family History   Problem Relation Age of Onset    Colon Polyps Father     Infertility Mother         Took Clomid. Short luteal phase    Breast Cancer Maternal Grandmother 73    Prostate Cancer Maternal Grandfather         dx around 50 or maybe younger    Thyroid disease Paternal Grandmother     Other (RA) Paternal Grandmother     No Known Problems Paternal Grandfather     Infertility Sister         Unexplained infertility. IVF for 1st & then conceived spontaneously    Other (genetic carrier) Sister         3 different mutations: CF, hereditary fructose intolerance, ARSACS carrier    Ovarian Cancer Neg     Colon Cancer Neg     Diabetes Neg     Birth Defects Neg     Genetic Disease Neg     Endometriosis Neg     Autoimmune disease Neg     Uterine Cancer Neg     Pancreatic Cancer Neg        Review of Systems   Constitutional:  Negative for chills and fever.   HENT:  Negative for congestion and trouble swallowing.    Respiratory:  Negative for chest  tightness and shortness of breath.      Current Outpatient Medications   Medication Sig Dispense Refill    montelukast 10 MG Oral Tab Take 1 tablet (10 mg total) by mouth nightly. 90 tablet 3    omeprazole 20 MG Oral Capsule Delayed Release Take 1 capsule (20 mg total) by mouth daily. 90 capsule 3    albuterol 108 (90 Base) MCG/ACT Inhalation Aero Soln Inhale 2 puffs into the lungs every 4 (four) hours as needed. 1 each 2    levothyroxine 50 MCG Oral Tab Take 1 tablet (50 mcg total) by mouth daily. Overdue for labs including recheck of TSH. 30 tablet 0    fluticasone propionate 50 MCG/ACT Nasal Suspension 2 sprays by Nasal route daily. 16 g 0    ibuprofen 600 MG Oral Tab Take 1 tablet (600 mg total) by mouth every 6 (six) hours as needed for Pain. 30 tablet 0    lidocaine-prilocaine 2.5-2.5 % External Cream Apply topically as needed.      cetirizine 10 MG Oral Tab Take 1 tablet (10 mg total) by mouth daily.      Methylcobalamin (B12-ACTIVE OR) Take by mouth.      albuterol (5 MG/ML) 0.5% Inhalation Nebu Soln Take 0.5 mL (2.5 mg total) by nebulization every 6 (six) hours as needed.      albuterol (2.5 MG/3ML) 0.083% Inhalation Nebu Soln Take 3 mL (2.5 mg total) by nebulization every 6 (six) hours as needed.      prenatal vitamin with DHA 27-0.8-228 MG Oral Cap Take 1 capsule by mouth daily.      Cholecalciferol (VITAMIN D-3) 25 MCG (1000 UT) Oral Cap Take 6,000 Int'l Units by mouth daily.       Allergies:Allergies[1]    Objective:   Physical Exam  Vitals reviewed.   Constitutional:       Appearance: Normal appearance. She is well-developed.   HENT:      Head: Normocephalic and atraumatic.   Pulmonary:      Effort: Pulmonary effort is normal.   Neurological:      Mental Status: She is alert.   Psychiatric:         Mood and Affect: Mood normal.         Behavior: Behavior normal.         Assessment & Plan:   1. Hypothyroidism, unspecified type    2. Gastroesophageal reflux disease without esophagitis    3. Multiple food  allergies    4. Multiple allergies    5. Mild intermittent asthma without complication (HCC)    Continue Levothyroxine. Check TSH in the next few months.   Rx PPI trial. Discussed risks/benefits/potential side effects and proper use of medication.   3,4. Allergist referral.   5. No current symptoms. Continue PRN inhaler.     Orders Placed This Encounter   Procedures    Fluzone trivalent vaccine, PF 0.5mL, 6mo+ (69925)       Meds This Visit:  Requested Prescriptions     Signed Prescriptions Disp Refills    montelukast 10 MG Oral Tab 90 tablet 3     Sig: Take 1 tablet (10 mg total) by mouth nightly.    omeprazole 20 MG Oral Capsule Delayed Release 90 capsule 3     Sig: Take 1 capsule (20 mg total) by mouth daily.    albuterol 108 (90 Base) MCG/ACT Inhalation Aero Soln 1 each 2     Sig: Inhale 2 puffs into the lungs every 4 (four) hours as needed.       Imaging & Referrals:  GASTRO - INTERNAL  ALLERGY - INTERNAL  INFLUENZA VACCINE, TRI, PRESERV FREE, 0.5 ML         [1]   Allergies  Allergen Reactions    Cephalexin SWELLING    Mold WHEEZING    Molds & Smuts OTHER (SEE COMMENTS) and WHEEZING    Nexplanon [Etonogestrel] OTHER (SEE COMMENTS)     Ovarian cysts. Reports had severe migraine & visual issues.    Hydrocodone NAUSEA ONLY and DIZZINESS    Kdc:Acetaminophen+Sorbitan+Propoxyphene DIZZINESS and NAUSEA AND VOMITING    [place] UNKNOWN    Pollen OTHER (SEE COMMENTS)    Adhesive Tape RASH     Allergic to paper tape only    Topiramate RASH     Red blotches on legs

## 2025-03-02 ENCOUNTER — HOSPITAL ENCOUNTER (OUTPATIENT)
Age: 35
Discharge: HOME | End: 2025-03-02
Payer: COMMERCIAL

## 2025-03-02 VITALS
OXYGEN SATURATION: 99 % | DIASTOLIC BLOOD PRESSURE: 65 MMHG | TEMPERATURE: 97 F | RESPIRATION RATE: 18 BRPM | BODY MASS INDEX: 45.24 KG/M2 | HEIGHT: 64 IN | HEART RATE: 64 BPM | SYSTOLIC BLOOD PRESSURE: 111 MMHG | WEIGHT: 265 LBS

## 2025-03-02 DIAGNOSIS — R21 RASH: Primary | ICD-10-CM

## 2025-03-02 RX ORDER — CLOTRIMAZOLE AND BETAMETHASONE DIPROPIONATE 10; .64 MG/G; MG/G
1 CREAM TOPICAL 2 TIMES DAILY
Qty: 45 G | Refills: 0 | Status: SHIPPED | OUTPATIENT
Start: 2025-03-02

## 2025-03-02 RX ORDER — METHYLPREDNISOLONE 4 MG/1
TABLET ORAL
Qty: 1 EACH | Refills: 0 | Status: SHIPPED | OUTPATIENT
Start: 2025-03-02

## 2025-03-02 RX ORDER — HYDROXYZINE HYDROCHLORIDE 25 MG/1
25 TABLET, FILM COATED ORAL EVERY 8 HOURS PRN
Qty: 20 TABLET | Refills: 0 | Status: SHIPPED | OUTPATIENT
Start: 2025-03-02

## 2025-03-02 NOTE — ED PROVIDER NOTES
Patient Seen in: [facility]      History     Chief Complaint   Patient presents with    Allergic Rxn Allergies     Stated Complaint: Allergic reaction    Subjective:   HPI    Mary Huerta is a 35 year old female presents with pruritic pruritic rash that is generalized in nature for over 1 month.  NKDA/ NKA.  No associated shortness of breath, facial, lip, eyelid swelling, stridor, throat pain, dysphagia, use of new soaps or detergents, new medication use, fevers, chills, discharge, neck pain/ stiffness. No medications taken pta.         Objective:     Past Medical History:    Abdominal pain    Acute severe asthma (HCC)    Asthma (HCC)    Asthmatic bronchitis (HCC)    Chronic interstitial cystitis    Symptoms since 8 years old but not formally diagnosed until adulthood.    Colon polyp    Complicated migraine    with visual aura, verbal & balance/coordination issues.    Dizziness    Dysphagia    E-coli UTI    Eczema    Ganglion of flexor tendon sheath of right index finger    GERD (gastroesophageal reflux disease)    Gestational hypertension (HCC)    noted at admission at 38+ wk for non reassuring  testing being done for obesity.    Gross hematuria    urine culture >100k E.coli    History of pelvic ultrasound    Pelvic US unremarkable    Infertility management    Clomid & IUI to conceive  pregnancy    Lightheadedness    Migraine with aura    Morbid obesity with BMI of 40.0-44.9, adult (HCC)    Numbness and tingling    Rheum visit 23 - clinical picture, imaging, lab data not consistent with known rheum disorders. lower extremity numbness and tingling, you could consider nerve conduction studies or special testing for small fiber neuropathy (though admittedly, this would not explain vertigo, visual snow or migraine symptoms).    Pap smear for cervical cancer screening    [facility]ap & HPV negative 23 - [facility]    Prediabetes    10/4/23 A1c  6.4%    Sciatica    Skin lesion of breast    11/7/24 Diagnostic mammogram for a draining skin lesion RIGHT breast at 8 o'clock 4 cm from nipple. Consistent with sebaceous cyst.    Vertigo    mal de debarquement syndrome. MDDS - frontal lobe communicates incorrectly to vestibular system. Feels likes she is moving when she is not. Went to PT. Helped. Valium helps to stop the symptoms.    Visual disturbance    Visual snow syndrome    reports this developed while on Nexplanon. fuzzy dots in front of her 20/20 vision all the time. Has a fog & some \"purple marks\" in her vision as well. Negative image for up to 5 minutes at its worst. Can get black spots/moving spots.              Past Surgical History:   Procedure Laterality Date    Appendectomy  10/11/2014    Colonoscopy & polypectomy  2019    Does have polyps. Was told every 3 years    Cystourethroscopy  10/18/2023    Cystoscopy Dr. Ko Flores for gross hematuria. Unremarkable.    Egd  2019    esophagus inflamed. Was having dysphagia & GERD.    Hysterosalpingogram - external referral  11/09/2023    normal cavity. By IVF doctor Quang Walker    Insert contraceptive capsul  2012    Nexplanon    Knee surgery Left 01/2019    Laparoscopic appendectomy  10/11/2014    not ruptured    Laparoscopic cholecystectomy  2012    +Stones & sludge    Other surgical history Right 08/09/2023    right ringer finger cyst removal    Removal of contraceptive capsul  2014    H/o \"severe allergic reaction\" to birth control (Nexplanon) which consisted of severe migraines, visual issues. Reports this episode occurred after one year with nexplanon. Initially improved after removal of nexplanon implant, but then reoccurred with subsequent menstrual cycle. States these symptoms significantly improved with nuva ring and cessation of menstrual cycles    Removal of ovarian cyst(s) Right 10/11/2014    done at same time of appendectomy. Maybe just functional cyst    Sinus surgery    2019    deviated septum     Spinal puncture,lumbar,diagnostic  2014    She had an LP in 2014 which, per report, showed the following:-- CSF May 2014. Glc 51, protein 34, WBC 0, RBC 6, VDRL negative, OCB negative, opening pressure 14.5 cm in lateral position.    Tonsillectomy  1995                Social History     Socioeconomic History    Marital status:    Tobacco Use    Smoking status: Never     Passive exposure: Never    Smokeless tobacco: Never   Vaping Use    Vaping status: Never Used   Substance and Sexual Activity    Alcohol use: Yes     Comment: rarely    Drug use: Never    Sexual activity: Yes     Partners: Male     Social Drivers of Health     Food Insecurity: No Food Insecurity (9/13/2024)    Food Insecurity     Food Insecurity: Never true   Transportation Needs: No Transportation Needs (9/13/2024)    Transportation Needs     Lack of Transportation: No     Car Seat: Yes   Stress: No Stress Concern Present (9/13/2024)    Stress     Feeling of Stress : No   Housing Stability: Low Risk  (9/13/2024)    Housing Stability     Housing Instability: No     Crib or Bassinette: Yes              Review of Systems   All other systems reviewed and are negative.      Positive for stated complaint: Allergic reaction  Other systems are as noted in HPI.  Constitutional and vital signs reviewed.      All other systems reviewed and negative except as noted above.    Physical Exam     ED Triage Vitals [03/02/25 0904]   /65   Pulse 64   Resp 18   Temp 97.1 °F (36.2 °C)   Temp src Oral   SpO2 99 %   O2 Device None (Room air)       Current Vitals:   Vital Signs  BP: 111/65  Pulse: 64  Resp: 18  Temp: 97.1 °F (36.2 °C)  Temp src: Oral    Oxygen Therapy  SpO2: 99 %  O2 Device: None (Room air)        Physical Exam  Vitals and nursing note reviewed.   Constitutional:       General: She is not in acute distress.     Appearance: Normal appearance. She is normal weight. She is not ill-appearing, toxic-appearing or diaphoretic.   HENT:      Head:  Normocephalic and atraumatic.      Right Ear: External ear normal.      Left Ear: External ear normal.      Nose: Nose normal.   Eyes:      Extraocular Movements: Extraocular movements intact.      Conjunctiva/sclera: Conjunctivae normal.      Pupils: Pupils are equal, round, and reactive to light.   Pulmonary:      Effort: Pulmonary effort is normal. No respiratory distress.   Musculoskeletal:         General: No swelling, tenderness, deformity or signs of injury. Normal range of motion.      Cervical back: Normal range of motion and neck supple.   Skin:     General: Skin is warm and dry.      Capillary Refill: Capillary refill takes less than 2 seconds.      Coloration: Skin is not jaundiced or pale.      Findings: Rash present. No bruising, erythema or lesion.      Comments: Multiple patches that are red, raised, with distinct borders    Neurological:      General: No focal deficit present.      Mental Status: She is alert and oriented to person, place, and time. Mental status is at baseline.      Gait: Gait normal.   Psychiatric:         Mood and Affect: Mood normal.         Behavior: Behavior normal.             ED Course   Labs Reviewed - No data to display                MDM             Medical Decision Making  35 year old well appearing female presents with pruritic pruritic rash that is generalized in nature for over 1 month.   Immunizations are  up to date. Considerations include but not limited to intertrigo vs ring worm vs hives vs viral exanthem vs scabies.     Plan  - SpO2 99% on room air which is adequate for patient   - meds: see MAR    - discharge to home   - rx: hydroxyzine  25mg po q 6 hours/ prn. Medrol dose pack (patient request due to reported history of unspecified inflammatory condition) lotrisone topical applied three times daily   - refer to PCP and/ or Dermatology as needed  - return to ED if symptoms worsen        Disposition and Plan     Clinical Impression:  1. Rash          Disposition:  Discharge  3/2/2025  9:43 am    Follow-up:  Janette Spaulding DO  [address], [address]  [facility] [id number] [phone]          [address] [address]  [phone]              Medications Prescribed:  Discharge Medication List as of 3/2/2025  9:44 AM        START taking these medications    Details   clotrimazole-betamethasone 1-0.05 % External Cream Apply 1 Application topically 2 (two) times daily., Normal, Disp-45 g, R-0      methylPREDNISolone (MEDROL) 4 MG Oral Tablet Therapy Pack Dosepack: take as directed, Normal, Disp-1 each, R-0      hydrOXYzine 25 MG Oral Tab Take 1 tablet (25 mg total) by mouth every 8 (eight) hours as needed for Itching., Normal, Disp-20 tablet, R-0                 Supplementary Documentation:

## 2025-03-02 NOTE — ED INITIAL ASSESSMENT (HPI)
Itching x 1 month  Sensitive skin  Had referral for allergist but had to cancel due to COVID  Is a breastfeeding mom  Having hives, painful and itching some lip swelling  Zyrtec daily

## 2025-07-14 ENCOUNTER — TELEPHONE (OUTPATIENT)
Facility: CLINIC | Age: 35
End: 2025-07-14

## 2025-07-14 NOTE — TELEPHONE ENCOUNTER
Spoke with patient. LMP- 6/11/25, first period since delivery. She is breastfeeding. Had some light spotting last night, continues to have brownish/red spotting, not heavy. She is not actively trying to conceive but not preventing it. Unsure if this is her period or if it could be implantation bleeding. Discussed could be her period starting and since breastfeeding can have irregular periods/bleeding. To continue to monitor and call if any heavy bleeding. Aware will route to a provider and call if any additional recommendations. Verbalized understanding.

## 2025-07-14 NOTE — TELEPHONE ENCOUNTER
Spoke with patient. Aware she can do a home pregnancy test and continue to monitor. Verbalized understanding.

## (undated) DEVICE — LARGE, DISPOSABLE ALEXIS O C-SECTION PROTECTOR - RETRACTOR: Brand: ALEXIS ® O C-SECTION PROTECTOR - RETRACTOR

## (undated) DEVICE — STAPLER SKIN 30 ABSRB STPL RAP INSORB

## (undated) NOTE — Clinical Note
Thank you for allowing me to care for your patient! Nothing to do for sebaceous cyst right now, I've advised her to watch the lesion and if anything needs to be drained I'm happy to help. Thanks, [place]anaco

## (undated) NOTE — LETTER
11/19/2024    Mary Huerta  [address] [facility] [address]    Dear Mary,    We would like to inform you that your account has been charged $40 for not showing up to the office for your scheduled appointment on 11/19/2024.    Our no-show policy is as follows: A 24-hour notice is required, or you may be charged a $40 No Show fee.      If you are unable to keep your scheduled appointment, please notify us at least 24 hours in advance so we can accommodate our other patients. You may also reschedule your appointment at that time.    On the third no-show, within a 12-month period, it will be the physician’s discretion as to whether a discharge letter will be sent out disengaging you from the practice and giving you 30 days to enroll with a new non [facility] physician.    If you would like to contest this charge, please call [phone].    Sincerely,  [facility]